# Patient Record
Sex: MALE | Race: WHITE | NOT HISPANIC OR LATINO | Employment: OTHER
[De-identification: names, ages, dates, MRNs, and addresses within clinical notes are randomized per-mention and may not be internally consistent; named-entity substitution may affect disease eponyms.]

---

## 2017-03-13 ENCOUNTER — HOSPITAL (OUTPATIENT)
Dept: OTHER | Age: 77
End: 2017-03-13
Attending: UROLOGY

## 2017-03-13 LAB — PSA SERPL-MCNC: 0.09 NG/ML

## 2017-09-11 ENCOUNTER — HOSPITAL (OUTPATIENT)
Dept: OTHER | Age: 77
End: 2017-09-11
Attending: UROLOGY

## 2017-09-11 LAB — PSA SERPL-MCNC: 0.07 NG/ML

## 2018-03-13 ENCOUNTER — HOSPITAL (OUTPATIENT)
Dept: OTHER | Age: 78
End: 2018-03-13
Attending: UROLOGY

## 2018-03-13 LAB — PSA SERPL-MCNC: 0.04 NG/ML

## 2018-08-20 ENCOUNTER — HOSPITAL (OUTPATIENT)
Dept: OTHER | Age: 78
End: 2018-08-20
Attending: INTERNAL MEDICINE

## 2018-08-29 PROBLEM — M70.62 TROCHANTERIC BURSITIS OF LEFT HIP: Status: ACTIVE | Noted: 2018-08-29

## 2018-09-17 ENCOUNTER — HOSPITAL (OUTPATIENT)
Dept: OTHER | Age: 78
End: 2018-09-17
Attending: UROLOGY

## 2018-09-17 LAB — PSA SERPL-MCNC: 0.02 NG/ML

## 2019-01-10 ENCOUNTER — HOSPITAL ENCOUNTER (OUTPATIENT)
Dept: MRI IMAGING | Facility: HOSPITAL | Age: 79
Discharge: HOME | End: 2019-01-10
Attending: NURSE PRACTITIONER
Payer: MEDICARE

## 2019-01-10 DIAGNOSIS — Z86.79 HISTORY OF INTRACRANIAL HEMORRHAGE: ICD-10-CM

## 2019-01-10 PROCEDURE — 70551 MRI BRAIN STEM W/O DYE: CPT | Performed by: NURSE PRACTITIONER

## 2019-02-08 ENCOUNTER — HOSPITAL (OUTPATIENT)
Dept: OTHER | Age: 79
End: 2019-02-08
Attending: SPECIALIST

## 2019-02-08 LAB
ALBUMIN SERPL-MCNC: 4.2 G/DL (ref 3.6–5.1)
ALBUMIN SERPL-MCNC: 4.2 GM/DL (ref 3.6–5.1)
ALBUMIN/GLOB SERPL: 1.3 {RATIO} (ref 1–2.4)
ALBUMIN/GLOB SERPL: 1.3 {RATIO} (ref 1–2.4)
ALP SERPL-CCNC: 56 UNIT/L (ref 45–117)
ALP SERPL-CCNC: 56 UNITS/L (ref 45–117)
ALT SERPL-CCNC: 35 UNIT/L
ALT SERPL-CCNC: 35 UNITS/L
ANALYZER ANC (IANC): NORMAL
ANALYZER ANC (IANC): NORMAL
ANION GAP SERPL CALC-SCNC: 12 MMOL/L (ref 10–20)
ANION GAP SERPL CALC-SCNC: 12 MMOL/L (ref 10–20)
AST SERPL-CCNC: 22 UNIT/L
AST SERPL-CCNC: 22 UNITS/L
BILIRUB SERPL-MCNC: 1.5 MG/DL (ref 0.2–1)
BILIRUB SERPL-MCNC: 1.5 MG/DL (ref 0.2–1)
BUN SERPL-MCNC: 23 MG/DL (ref 6–20)
BUN SERPL-MCNC: 23 MG/DL (ref 6–20)
BUN/CREAT SERPL: 30 (ref 7–25)
BUN/CREAT SERPL: 30 (ref 7–25)
CALCIUM SERPL-MCNC: 9.4 MG/DL (ref 8.4–10.2)
CALCIUM SERPL-MCNC: 9.4 MG/DL (ref 8.4–10.2)
CHLORIDE SERPL-SCNC: 103 MMOL/L (ref 98–107)
CHLORIDE: 103 MMOL/L (ref 98–107)
CHOLEST SERPL-MCNC: 108 MG/DL
CHOLEST SERPL-MCNC: 108 MG/DL
CHOLEST SERPL-MCNC: NORMAL MG/DL
CHOLEST/HDLC SERPL: 2.7 {RATIO}
CHOLEST/HDLC SERPL: 2.7 {RATIO}
CO2 SERPL-SCNC: 27 MMOL/L (ref 21–32)
CO2 SERPL-SCNC: 27 MMOL/L (ref 21–32)
CREAT SERPL-MCNC: 0.76 MG/DL (ref 0.67–1.17)
CREAT SERPL-MCNC: 0.76 MG/DL (ref 0.67–1.17)
ERYTHROCYTE [DISTWIDTH] IN BLOOD: 12.7 % (ref 11–15)
ERYTHROCYTE [DISTWIDTH] IN BLOOD: 12.7 % (ref 11–15)
FREE T4: 0.9 NG/DL (ref 0.8–1.5)
GLOBULIN SER-MCNC: 3.3 G/DL (ref 2–4)
GLOBULIN SER-MCNC: 3.3 GM/DL (ref 2–4)
GLUCOSE SERPL-MCNC: 110 MG/DL (ref 65–99)
GLUCOSE SERPL-MCNC: 110 MG/DL (ref 65–99)
HCT VFR BLD CALC: 42.2 % (ref 39–51)
HDLC SERPL-MCNC: 40 MG/DL
HDLC SERPL-MCNC: 40 MG/DL
HDLC SERPL-MCNC: NORMAL MG/DL
HEMATOCRIT: 42.2 % (ref 39–51)
HGB BLD-MCNC: 13.9 G/DL (ref 13–17)
HGB BLD-MCNC: 13.9 GM/DL (ref 13–17)
LDLC SERPL CALC-MCNC: 59 MG/DL
LDLC SERPL CALC-MCNC: 59 MG/DL
LDLC SERPL CALC-MCNC: NORMAL MG/DL
LENGTH OF FAST TIME PATIENT: ABNORMAL HR
LENGTH OF FAST TIME PATIENT: ABNORMAL HRS
LENGTH OF FAST TIME PATIENT: NORMAL HR
LENGTH OF FAST TIME PATIENT: NORMAL HRS
MAGNESIUM SERPL-MCNC: 1.8 MG/DL (ref 1.7–2.4)
MAGNESIUM SERPL-MCNC: 1.8 MG/DL (ref 1.7–2.4)
MCH RBC QN AUTO: 30.6 PG (ref 26–34)
MCH RBC QN AUTO: 30.6 PG (ref 26–34)
MCHC RBC AUTO-ENTMCNC: 32.9 G/DL (ref 32–36.5)
MCHC RBC AUTO-ENTMCNC: 32.9 GM/DL (ref 32–36.5)
MCV RBC AUTO: 93 FL (ref 78–100)
MCV RBC AUTO: 93 FL (ref 78–100)
NONHDLC SERPL-MCNC: 68 MG/DL
NONHDLC SERPL-MCNC: 68 MG/DL
NONHDLC SERPL-MCNC: NORMAL MG/DL
NRBC (NRBCRE): 0 /100 WBC
NRBC (NRBCRE): 0 /100 WBC
PLATELET # BLD: 181 K/MCL (ref 140–450)
PLATELET # BLD: 181 THOUSAND/MCL (ref 140–450)
POTASSIUM SERPL-SCNC: 4.4 MMOL/L (ref 3.4–5.1)
POTASSIUM SERPL-SCNC: 4.4 MMOL/L (ref 3.4–5.1)
PROT SERPL-MCNC: 7.5 G/DL (ref 6.4–8.2)
PROT SERPL-MCNC: 7.5 GM/DL (ref 6.4–8.2)
RBC # BLD: 4.54 MIL/MCL (ref 4.5–5.9)
RBC # BLD: 4.54 MILLION/MCL (ref 4.5–5.9)
SODIUM SERPL-SCNC: 138 MMOL/L (ref 135–145)
SODIUM SERPL-SCNC: 138 MMOL/L (ref 135–145)
T4 FREE SERPL-MCNC: 0.9 NG/DL (ref 0.8–1.5)
TRIGL SERPL-MCNC: 44 MG/DL
TRIGL SERPL-MCNC: NORMAL MG/DL
TRIGLYCERIDE (TRIGP): 44 MG/DL
TSH SERPL-ACNC: 1.09 MCUNIT/ML (ref 0.35–5)
TSH SERPL-ACNC: 1.09 MCUNITS/ML (ref 0.35–5)
WBC # BLD: 5.7 K/MCL (ref 4.2–11)
WBC # BLD: 5.7 THOUSAND/MCL (ref 4.2–11)

## 2019-02-21 ENCOUNTER — EXTERNAL RECORD (OUTPATIENT)
Dept: HEALTH INFORMATION MANAGEMENT | Facility: OTHER | Age: 79
End: 2019-02-21

## 2019-03-15 ENCOUNTER — HOSPITAL (OUTPATIENT)
Dept: OTHER | Age: 79
End: 2019-03-15
Attending: UROLOGY

## 2019-03-15 LAB
PSA SERPL-MCNC: 0.02 NG/ML
PSA SERPL-MCNC: 0.02 NG/ML
PSA SERPL-MCNC: NORMAL NG/ML

## 2019-09-09 ENCOUNTER — HOSPITAL (OUTPATIENT)
Dept: OTHER | Age: 79
End: 2019-09-09
Attending: UROLOGY

## 2019-09-09 LAB
PSA SERPL-MCNC: 0.02 NG/ML
PSA SERPL-MCNC: NORMAL NG/ML

## 2019-12-03 ENCOUNTER — HOSPITAL (OUTPATIENT)
Dept: OTHER | Age: 79
End: 2019-12-03
Attending: SPECIALIST

## 2020-03-09 ENCOUNTER — HOSPITAL ENCOUNTER (OUTPATIENT)
Dept: LAB | Age: 80
Discharge: HOME | End: 2020-03-09
Attending: UROLOGY

## 2020-03-09 DIAGNOSIS — C61 MALIGNANT NEOPLASM OF PROSTATE (CMD): Primary | ICD-10-CM

## 2020-03-09 LAB — PSA SERPL-MCNC: 0.01 NG/ML

## 2020-03-09 PROCEDURE — 84153 ASSAY OF PSA TOTAL: CPT

## 2020-03-09 PROCEDURE — 36415 COLL VENOUS BLD VENIPUNCTURE: CPT

## 2020-06-19 ENCOUNTER — APPOINTMENT (OUTPATIENT)
Dept: GENERAL RADIOLOGY | Age: 80
DRG: 312 | End: 2020-06-19
Attending: EMERGENCY MEDICINE

## 2020-06-19 ENCOUNTER — APPOINTMENT (OUTPATIENT)
Dept: MRI IMAGING | Age: 80
DRG: 312 | End: 2020-06-19
Attending: EMERGENCY MEDICINE

## 2020-06-19 ENCOUNTER — APPOINTMENT (OUTPATIENT)
Dept: CT IMAGING | Age: 80
DRG: 312 | End: 2020-06-19
Attending: EMERGENCY MEDICINE

## 2020-06-19 ENCOUNTER — HOSPITAL ENCOUNTER (INPATIENT)
Age: 80
LOS: 1 days | Discharge: HOME | DRG: 312 | End: 2020-06-21
Attending: EMERGENCY MEDICINE | Admitting: INTERNAL MEDICINE

## 2020-06-19 DIAGNOSIS — R55 SYNCOPE AND COLLAPSE: Primary | ICD-10-CM

## 2020-06-19 LAB
ALBUMIN SERPL-MCNC: 3.7 G/DL (ref 3.6–5.1)
ALBUMIN/GLOB SERPL: 1.2 {RATIO} (ref 1–2.4)
ALP SERPL-CCNC: 60 UNITS/L (ref 45–117)
ALT SERPL-CCNC: 44 UNITS/L
ANION GAP SERPL CALC-SCNC: 8 MMOL/L (ref 10–20)
AST SERPL-CCNC: 31 UNITS/L
ATRIAL RATE (BPM): 70
BASOPHILS # BLD: 0 K/MCL (ref 0–0.3)
BASOPHILS NFR BLD: 0 %
BILIRUB SERPL-MCNC: 1 MG/DL (ref 0.2–1)
BUN SERPL-MCNC: 26 MG/DL (ref 6–20)
BUN/CREAT SERPL: 33 (ref 7–25)
CALCIUM SERPL-MCNC: 8.8 MG/DL (ref 8.4–10.2)
CHLORIDE SERPL-SCNC: 108 MMOL/L (ref 98–107)
CO2 SERPL-SCNC: 26 MMOL/L (ref 21–32)
CREAT SERPL-MCNC: 0.8 MG/DL (ref 0.67–1.17)
DIFFERENTIAL METHOD BLD: ABNORMAL
EOSINOPHIL # BLD: 0.1 K/MCL (ref 0.1–0.5)
EOSINOPHIL NFR BLD: 2 %
ERYTHROCYTE [DISTWIDTH] IN BLOOD: 13.7 % (ref 11–15)
GLOBULIN SER-MCNC: 3.2 G/DL (ref 2–4)
GLUCOSE SERPL-MCNC: 121 MG/DL (ref 65–99)
HCT VFR BLD CALC: 36 % (ref 39–51)
HGB BLD-MCNC: 12.4 G/DL (ref 13–17)
IMM GRANULOCYTES # BLD AUTO: 0 K/MCL (ref 0–0.2)
IMM GRANULOCYTES NFR BLD: 0 %
LYMPHOCYTES # BLD: 1.5 K/MCL (ref 1–4)
LYMPHOCYTES NFR BLD: 28 %
MCH RBC QN AUTO: 31.8 PG (ref 26–34)
MCHC RBC AUTO-ENTMCNC: 34.4 G/DL (ref 32–36.5)
MCV RBC AUTO: 92.3 FL (ref 78–100)
MONOCYTES # BLD: 1.1 K/MCL (ref 0.3–0.9)
MONOCYTES NFR BLD: 20 %
NEUTROPHILS # BLD: 2.7 K/MCL (ref 1.8–7.7)
NEUTROPHILS NFR BLD: 50 %
NRBC BLD MANUAL-RTO: 0 /100 WBC
PLATELET # BLD: 177 K/MCL (ref 140–450)
POTASSIUM SERPL-SCNC: 3.9 MMOL/L (ref 3.4–5.1)
PROT SERPL-MCNC: 6.9 G/DL (ref 6.4–8.2)
QRS-INTERVAL (MSEC): 94
QT-INTERVAL (MSEC): 436
QTC: 470
R AXIS (DEGREES): 46
RBC # BLD: 3.9 MIL/MCL (ref 4.5–5.9)
REPORT TEXT: NORMAL
SARS-COV-2 RNA RESP QL NAA+PROBE: NOT DETECTED
SERVICE CMNT-IMP: NORMAL
SODIUM SERPL-SCNC: 138 MMOL/L (ref 135–145)
SPECIMEN SOURCE: NORMAL
T AXIS (DEGREES): 29
TROPONIN I SERPL HS-MCNC: <0.02 NG/ML
VENTRICULAR RATE EKG/MIN (BPM): 70
WBC # BLD: 5.5 K/MCL (ref 4.2–11)

## 2020-06-19 PROCEDURE — 93005 ELECTROCARDIOGRAM TRACING: CPT | Performed by: EMERGENCY MEDICINE

## 2020-06-19 PROCEDURE — 10002803 HB RX 637: Performed by: INTERNAL MEDICINE

## 2020-06-19 PROCEDURE — 71046 X-RAY EXAM CHEST 2 VIEWS: CPT

## 2020-06-19 PROCEDURE — G0378 HOSPITAL OBSERVATION PER HR: HCPCS

## 2020-06-19 PROCEDURE — 36415 COLL VENOUS BLD VENIPUNCTURE: CPT

## 2020-06-19 PROCEDURE — 87635 SARS-COV-2 COVID-19 AMP PRB: CPT

## 2020-06-19 PROCEDURE — 85025 COMPLETE CBC W/AUTO DIFF WBC: CPT

## 2020-06-19 PROCEDURE — 10002803 HB RX 637: Performed by: SPECIALIST

## 2020-06-19 PROCEDURE — 84484 ASSAY OF TROPONIN QUANT: CPT

## 2020-06-19 PROCEDURE — 99285 EMERGENCY DEPT VISIT HI MDM: CPT

## 2020-06-19 PROCEDURE — 80053 COMPREHEN METABOLIC PANEL: CPT

## 2020-06-19 PROCEDURE — 70450 CT HEAD/BRAIN W/O DYE: CPT

## 2020-06-19 RX ORDER — MV-MIN/FA/VIT K/LUTEIN/ZEAXANT 200MCG-5MG
1 CAPSULE ORAL DAILY
COMMUNITY
End: 2022-10-19

## 2020-06-19 RX ORDER — LOSARTAN POTASSIUM 25 MG/1
25 TABLET ORAL DAILY
Status: ON HOLD | COMMUNITY
End: 2020-06-21 | Stop reason: HOSPADM

## 2020-06-19 RX ORDER — HYDROCHLOROTHIAZIDE 12.5 MG/1
12.5 TABLET ORAL DAILY
Status: ON HOLD | COMMUNITY
End: 2020-06-21 | Stop reason: HOSPADM

## 2020-06-19 RX ORDER — MULTIVITAMIN,THER AND MINERALS
1 TABLET ORAL DAILY
Status: DISCONTINUED | OUTPATIENT
Start: 2020-06-19 | End: 2020-06-21 | Stop reason: HOSPADM

## 2020-06-19 RX ORDER — PANTOPRAZOLE SODIUM 40 MG/1
40 TABLET, DELAYED RELEASE ORAL NIGHTLY
Status: ON HOLD | COMMUNITY
Start: 2020-05-21 | End: 2020-06-21 | Stop reason: HOSPADM

## 2020-06-19 RX ORDER — ATORVASTATIN CALCIUM 20 MG/1
20 TABLET, FILM COATED ORAL NIGHTLY
Status: DISCONTINUED | OUTPATIENT
Start: 2020-06-19 | End: 2020-06-21 | Stop reason: HOSPADM

## 2020-06-19 RX ORDER — PANTOPRAZOLE SODIUM 40 MG/1
40 TABLET, DELAYED RELEASE ORAL NIGHTLY
Status: DISCONTINUED | OUTPATIENT
Start: 2020-06-19 | End: 2020-06-21 | Stop reason: HOSPADM

## 2020-06-19 RX ORDER — APIXABAN 5 MG/1
5 TABLET, FILM COATED ORAL EVERY 12 HOURS SCHEDULED
COMMUNITY
Start: 2020-04-05 | End: 2022-07-05 | Stop reason: SDUPTHER

## 2020-06-19 RX ORDER — TAMSULOSIN HYDROCHLORIDE 0.4 MG/1
0.4 CAPSULE ORAL NIGHTLY
Status: DISCONTINUED | OUTPATIENT
Start: 2020-06-19 | End: 2020-06-21 | Stop reason: HOSPADM

## 2020-06-19 RX ORDER — LEVOTHYROXINE SODIUM 0.03 MG/1
25 TABLET ORAL
COMMUNITY
End: 2020-06-19 | Stop reason: CLARIF

## 2020-06-19 RX ORDER — CHLORAL HYDRATE 500 MG
2 CAPSULE ORAL DAILY
COMMUNITY
End: 2023-09-28 | Stop reason: SDUPTHER

## 2020-06-19 RX ORDER — LOSARTAN POTASSIUM 50 MG/1
TABLET ORAL
COMMUNITY
Start: 2020-06-04 | End: 2020-06-19 | Stop reason: CLARIF

## 2020-06-19 RX ORDER — AMIODARONE HYDROCHLORIDE 200 MG/1
200 TABLET ORAL NIGHTLY
Status: DISCONTINUED | OUTPATIENT
Start: 2020-06-19 | End: 2020-06-21 | Stop reason: HOSPADM

## 2020-06-19 RX ORDER — ATORVASTATIN CALCIUM 20 MG/1
20 TABLET, FILM COATED ORAL NIGHTLY
COMMUNITY
Start: 2020-04-17 | End: 2023-09-14 | Stop reason: SDUPTHER

## 2020-06-19 RX ORDER — SOTALOL HYDROCHLORIDE 80 MG/1
80 TABLET ORAL
COMMUNITY
End: 2020-06-19 | Stop reason: ALTCHOICE

## 2020-06-19 RX ORDER — ENALAPRIL MALEATE 10 MG/1
15 TABLET ORAL 2 TIMES DAILY
COMMUNITY
End: 2022-11-04 | Stop reason: SDUPTHER

## 2020-06-19 RX ORDER — LOSARTAN POTASSIUM 25 MG/1
25 TABLET ORAL DAILY
Status: DISCONTINUED | OUTPATIENT
Start: 2020-06-20 | End: 2020-06-20

## 2020-06-19 RX ORDER — AMIODARONE HYDROCHLORIDE 200 MG/1
200 TABLET ORAL NIGHTLY
COMMUNITY
Start: 2020-04-10 | End: 2022-08-08 | Stop reason: SDUPTHER

## 2020-06-19 RX ORDER — METOPROLOL SUCCINATE 25 MG/1
25 TABLET, EXTENDED RELEASE ORAL NIGHTLY
Status: DISCONTINUED | OUTPATIENT
Start: 2020-06-19 | End: 2020-06-21 | Stop reason: HOSPADM

## 2020-06-19 RX ORDER — HYDROCHLOROTHIAZIDE 12.5 MG/1
12.5 TABLET ORAL DAILY
Status: DISCONTINUED | OUTPATIENT
Start: 2020-06-20 | End: 2020-06-20

## 2020-06-19 RX ORDER — ASPIRIN 325 MG
81 TABLET ORAL
COMMUNITY
End: 2020-06-19 | Stop reason: DRUGHIGH

## 2020-06-19 RX ORDER — TAMSULOSIN HYDROCHLORIDE 0.4 MG/1
0.4 CAPSULE ORAL EVERY EVENING
COMMUNITY

## 2020-06-19 RX ORDER — METOPROLOL SUCCINATE 25 MG/1
25 TABLET, EXTENDED RELEASE ORAL NIGHTLY
COMMUNITY
End: 2022-11-04 | Stop reason: SDUPTHER

## 2020-06-19 RX ADMIN — APIXABAN 5 MG: 5 TABLET, FILM COATED ORAL at 21:51

## 2020-06-19 RX ADMIN — ENALAPRIL MALEATE 15 MG: 10 TABLET ORAL at 21:51

## 2020-06-19 RX ADMIN — METOPROLOL SUCCINATE 25 MG: 25 TABLET, EXTENDED RELEASE ORAL at 21:51

## 2020-06-19 RX ADMIN — PANTOPRAZOLE SODIUM 40 MG: 40 TABLET, DELAYED RELEASE ORAL at 21:52

## 2020-06-19 RX ADMIN — TAMSULOSIN HYDROCHLORIDE 0.4 MG: 0.4 CAPSULE ORAL at 21:51

## 2020-06-19 RX ADMIN — AMIODARONE HYDROCHLORIDE 200 MG: 200 TABLET ORAL at 21:51

## 2020-06-19 RX ADMIN — ATORVASTATIN CALCIUM 20 MG: 20 TABLET, FILM COATED ORAL at 21:51

## 2020-06-19 ASSESSMENT — LIFESTYLE VARIABLES
HOW OFTEN DO YOU HAVE A DRINK CONTAINING ALCOHOL: 2 TO 4 TIMES PER MONTH
AUDIT-C TOTAL SCORE: 2
ALCOHOL_USE_STATUS: NO OR LOW RISK WITH VALIDATED TOOL
HOW OFTEN DO YOU HAVE 6 OR MORE DRINKS ON ONE OCCASION: NEVER
HOW MANY STANDARD DRINKS CONTAINING ALCOHOL DO YOU HAVE ON A TYPICAL DAY: 0,1 OR 2

## 2020-06-19 ASSESSMENT — COGNITIVE AND FUNCTIONAL STATUS - GENERAL
ARE YOU BLIND OR DO YOU HAVE SERIOUS DIFFICULTY SEEING, EVEN WHEN WEARING GLASSES: NO
DO YOU HAVE SERIOUS DIFFICULTY WALKING OR CLIMBING STAIRS: NO
ARE YOU DEAF OR DO YOU HAVE SERIOUS DIFFICULTY  HEARING: NO
BECAUSE OF A PHYSICAL, MENTAL, OR EMOTIONAL CONDITION, DO YOU HAVE SERIOUS DIFFICULTY CONCENTRATING, REMEMBERING OR MAKING DECISIONS: NO
BECAUSE OF A PHYSICAL, MENTAL, OR EMOTIONAL CONDITION, DO YOU HAVE DIFFICULTY DOING ERRANDS ALONE: NO
ARE YOU DEAF OR DO YOU HAVE SERIOUS DIFFICULTY  HEARING: NO
ARE YOU BLIND OR DO YOU HAVE SERIOUS DIFFICULTY SEEING, EVEN WHEN WEARING GLASSES: NO
DO YOU HAVE DIFFICULTY DRESSING OR BATHING: NO

## 2020-06-19 ASSESSMENT — PATIENT HEALTH QUESTIONNAIRE - PHQ9
CLINICAL INTERPRETATION OF PHQ2 SCORE: NO FURTHER SCREENING NEEDED
SUM OF ALL RESPONSES TO PHQ9 QUESTIONS 1 AND 2: 0
IS PATIENT ABLE TO COMPLETE PHQ2 OR PHQ9: YES
CLINICAL INTERPRETATION OF PHQ9 SCORE: NO FURTHER SCREENING NEEDED

## 2020-06-19 ASSESSMENT — ACTIVITIES OF DAILY LIVING (ADL)
CHRONIC_PAIN_PRESENT: NO
RECENT_DECLINE_ADL: NO
ADL_SHORT_OF_BREATH: NO
ADL_SCORE: 12
ADL_BEFORE_ADMISSION: INDEPENDENT

## 2020-06-19 ASSESSMENT — PAIN SCALES - GENERAL
PAINLEVEL_OUTOF10: 0

## 2020-06-20 PROCEDURE — 10002807 HB RX 258: Performed by: INTERNAL MEDICINE

## 2020-06-20 PROCEDURE — 10004651 HB RX, NO CHARGE ITEM: Performed by: SPECIALIST

## 2020-06-20 PROCEDURE — 10003585 HB ROOM CHARGE INTERMEDIATE CARE

## 2020-06-20 PROCEDURE — G0378 HOSPITAL OBSERVATION PER HR: HCPCS

## 2020-06-20 PROCEDURE — 10002803 HB RX 637: Performed by: INTERNAL MEDICINE

## 2020-06-20 PROCEDURE — 10002803 HB RX 637: Performed by: SPECIALIST

## 2020-06-20 PROCEDURE — 10004651 HB RX, NO CHARGE ITEM: Performed by: INTERNAL MEDICINE

## 2020-06-20 RX ORDER — ACETAMINOPHEN 325 MG/1
TABLET ORAL
Status: DISPENSED
Start: 2020-06-20 | End: 2020-06-21

## 2020-06-20 RX ORDER — SODIUM CHLORIDE 9 MG/ML
INJECTION, SOLUTION INTRAVENOUS CONTINUOUS
Status: DISPENSED | OUTPATIENT
Start: 2020-06-20 | End: 2020-06-21

## 2020-06-20 RX ORDER — ACETAMINOPHEN 325 MG/1
650 TABLET ORAL EVERY 6 HOURS PRN
Status: DISCONTINUED | OUTPATIENT
Start: 2020-06-20 | End: 2020-06-21 | Stop reason: HOSPADM

## 2020-06-20 RX ADMIN — SODIUM CHLORIDE: 0.9 INJECTION, SOLUTION INTRAVENOUS at 17:51

## 2020-06-20 RX ADMIN — ENALAPRIL MALEATE 15 MG: 10 TABLET ORAL at 20:54

## 2020-06-20 RX ADMIN — PANTOPRAZOLE SODIUM 40 MG: 40 TABLET, DELAYED RELEASE ORAL at 20:55

## 2020-06-20 RX ADMIN — ACETAMINOPHEN 650 MG: 325 TABLET, FILM COATED ORAL at 21:04

## 2020-06-20 RX ADMIN — ASPIRIN 81 MG: 81 TABLET, COATED ORAL at 09:25

## 2020-06-20 RX ADMIN — ATORVASTATIN CALCIUM 20 MG: 20 TABLET, FILM COATED ORAL at 20:54

## 2020-06-20 RX ADMIN — APIXABAN 5 MG: 5 TABLET, FILM COATED ORAL at 09:25

## 2020-06-20 RX ADMIN — METOPROLOL SUCCINATE 25 MG: 25 TABLET, EXTENDED RELEASE ORAL at 20:54

## 2020-06-20 RX ADMIN — ENALAPRIL MALEATE 15 MG: 10 TABLET ORAL at 09:51

## 2020-06-20 RX ADMIN — APIXABAN 5 MG: 5 TABLET, FILM COATED ORAL at 20:54

## 2020-06-20 RX ADMIN — AMIODARONE HYDROCHLORIDE 200 MG: 200 TABLET ORAL at 20:54

## 2020-06-20 RX ADMIN — TAMSULOSIN HYDROCHLORIDE 0.4 MG: 0.4 CAPSULE ORAL at 20:54

## 2020-06-20 RX ADMIN — Medication 1 TABLET: at 09:25

## 2020-06-20 ASSESSMENT — PAIN SCALES - GENERAL
PAINLEVEL_OUTOF10: 0
PAINLEVEL_OUTOF10: 4

## 2020-06-21 VITALS
HEIGHT: 70 IN | SYSTOLIC BLOOD PRESSURE: 160 MMHG | OXYGEN SATURATION: 98 % | WEIGHT: 151.24 LBS | RESPIRATION RATE: 20 BRPM | DIASTOLIC BLOOD PRESSURE: 79 MMHG | HEART RATE: 70 BPM | BODY MASS INDEX: 21.65 KG/M2 | TEMPERATURE: 97.7 F

## 2020-06-21 LAB
ALBUMIN SERPL-MCNC: 3.6 G/DL (ref 3.6–5.1)
ALBUMIN/GLOB SERPL: 1 {RATIO} (ref 1–2.4)
ALP SERPL-CCNC: 64 UNITS/L (ref 45–117)
ALT SERPL-CCNC: 39 UNITS/L
ANION GAP SERPL CALC-SCNC: 7 MMOL/L (ref 10–20)
AST SERPL-CCNC: 22 UNITS/L
BASOPHILS # BLD: 0 K/MCL (ref 0–0.3)
BASOPHILS NFR BLD: 0 %
BILIRUB SERPL-MCNC: 0.9 MG/DL (ref 0.2–1)
BUN SERPL-MCNC: 18 MG/DL (ref 6–20)
BUN/CREAT SERPL: 27 (ref 7–25)
CALCIUM SERPL-MCNC: 9 MG/DL (ref 8.4–10.2)
CHLORIDE SERPL-SCNC: 105 MMOL/L (ref 98–107)
CO2 SERPL-SCNC: 29 MMOL/L (ref 21–32)
CREAT SERPL-MCNC: 0.68 MG/DL (ref 0.67–1.17)
DIFFERENTIAL METHOD BLD: ABNORMAL
EOSINOPHIL # BLD: 0.1 K/MCL (ref 0.1–0.5)
EOSINOPHIL NFR BLD: 1 %
ERYTHROCYTE [DISTWIDTH] IN BLOOD: 13.5 % (ref 11–15)
GLOBULIN SER-MCNC: 3.6 G/DL (ref 2–4)
GLUCOSE SERPL-MCNC: 117 MG/DL (ref 65–99)
HCT VFR BLD CALC: 40 % (ref 39–51)
HGB BLD-MCNC: 13.4 G/DL (ref 13–17)
IMM GRANULOCYTES # BLD AUTO: 0 K/MCL (ref 0–0.2)
IMM GRANULOCYTES NFR BLD: 0 %
LYMPHOCYTES # BLD: 1.3 K/MCL (ref 1–4)
LYMPHOCYTES NFR BLD: 20 %
MCH RBC QN AUTO: 31.5 PG (ref 26–34)
MCHC RBC AUTO-ENTMCNC: 33.5 G/DL (ref 32–36.5)
MCV RBC AUTO: 93.9 FL (ref 78–100)
MONOCYTES # BLD: 1.2 K/MCL (ref 0.3–0.9)
MONOCYTES NFR BLD: 19 %
NEUTROPHILS # BLD: 3.9 K/MCL (ref 1.8–7.7)
NEUTROPHILS NFR BLD: 60 %
NRBC BLD MANUAL-RTO: 0 /100 WBC
PLATELET # BLD: 191 K/MCL (ref 140–450)
POTASSIUM SERPL-SCNC: 4.7 MMOL/L (ref 3.4–5.1)
PROT SERPL-MCNC: 7.2 G/DL (ref 6.4–8.2)
RBC # BLD: 4.26 MIL/MCL (ref 4.5–5.9)
SODIUM SERPL-SCNC: 136 MMOL/L (ref 135–145)
WBC # BLD: 6.6 K/MCL (ref 4.2–11)

## 2020-06-21 PROCEDURE — 85025 COMPLETE CBC W/AUTO DIFF WBC: CPT

## 2020-06-21 PROCEDURE — 10002803 HB RX 637: Performed by: INTERNAL MEDICINE

## 2020-06-21 PROCEDURE — 80053 COMPREHEN METABOLIC PANEL: CPT

## 2020-06-21 PROCEDURE — 36415 COLL VENOUS BLD VENIPUNCTURE: CPT

## 2020-06-21 PROCEDURE — 10004651 HB RX, NO CHARGE ITEM: Performed by: SPECIALIST

## 2020-06-21 PROCEDURE — 10002803 HB RX 637: Performed by: SPECIALIST

## 2020-06-21 RX ORDER — MULTIVITAMIN,THER AND MINERALS
1 TABLET ORAL DAILY
Qty: 30 TABLET | Refills: 0 | INPATIENT
Start: 2020-06-22 | End: 2020-06-21 | Stop reason: CLARIF

## 2020-06-21 RX ORDER — PANTOPRAZOLE SODIUM 40 MG/1
40 TABLET, DELAYED RELEASE ORAL NIGHTLY
INPATIENT
Start: 2020-06-21 | End: 2023-08-28

## 2020-06-21 RX ADMIN — Medication 1 TABLET: at 08:48

## 2020-06-21 RX ADMIN — ENALAPRIL MALEATE 15 MG: 10 TABLET ORAL at 08:48

## 2020-06-21 RX ADMIN — ASPIRIN 81 MG: 81 TABLET, COATED ORAL at 08:47

## 2020-06-21 RX ADMIN — APIXABAN 5 MG: 5 TABLET, FILM COATED ORAL at 08:48

## 2020-06-21 ASSESSMENT — PAIN SCALES - GENERAL: PAINLEVEL_OUTOF10: 0

## 2020-09-11 ENCOUNTER — HOSPITAL ENCOUNTER (OUTPATIENT)
Dept: LAB | Age: 80
Discharge: HOME | End: 2020-09-11
Attending: UROLOGY

## 2020-09-11 DIAGNOSIS — N41.9 PROSTATITIS, UNSPECIFIED PROSTATITIS TYPE: Primary | ICD-10-CM

## 2020-09-11 LAB — PSA SERPL-MCNC: 0.01 NG/ML

## 2020-09-11 PROCEDURE — 84153 ASSAY OF PSA TOTAL: CPT

## 2020-09-11 PROCEDURE — 36415 COLL VENOUS BLD VENIPUNCTURE: CPT

## 2021-03-10 ENCOUNTER — HOSPITAL ENCOUNTER (OUTPATIENT)
Dept: LAB | Age: 81
Discharge: HOME | End: 2021-03-10
Attending: UROLOGY

## 2021-03-10 DIAGNOSIS — C61 PROSTATE CANCER (CMD): Primary | ICD-10-CM

## 2021-03-10 LAB — PSA SERPL-MCNC: <0.01 NG/ML

## 2021-03-10 PROCEDURE — 84153 ASSAY OF PSA TOTAL: CPT

## 2021-03-10 PROCEDURE — 36415 COLL VENOUS BLD VENIPUNCTURE: CPT

## 2021-03-18 ENCOUNTER — IMMUNIZATION (OUTPATIENT)
Dept: LAB | Age: 81
End: 2021-03-18

## 2021-03-18 DIAGNOSIS — Z23 NEED FOR VACCINATION: Primary | ICD-10-CM

## 2021-03-18 PROCEDURE — 91300 COVID 19 PFIZER-BIONTECH: CPT | Performed by: NURSE PRACTITIONER

## 2021-03-18 PROCEDURE — 0001A COVID 19 PFIZER-BIONTECH: CPT | Performed by: NURSE PRACTITIONER

## 2021-04-08 ENCOUNTER — IMMUNIZATION (OUTPATIENT)
Dept: LAB | Age: 81
End: 2021-04-08

## 2021-04-08 DIAGNOSIS — Z23 NEED FOR VACCINATION: Primary | ICD-10-CM

## 2021-04-08 PROCEDURE — 91300 COVID 19 PFIZER-BIONTECH: CPT

## 2021-04-08 PROCEDURE — 0002A COVID 19 PFIZER-BIONTECH: CPT

## 2021-09-09 ENCOUNTER — HOSPITAL ENCOUNTER (OUTPATIENT)
Dept: LAB | Age: 81
Discharge: HOME | End: 2021-09-09
Attending: UROLOGY

## 2021-09-09 DIAGNOSIS — Z12.5 PROSTATE CANCER SCREENING: Primary | ICD-10-CM

## 2021-09-09 LAB — PSA SERPL-MCNC: <0.01 NG/ML

## 2021-09-09 PROCEDURE — 84153 ASSAY OF PSA TOTAL: CPT | Performed by: UROLOGY

## 2021-09-09 PROCEDURE — 36415 COLL VENOUS BLD VENIPUNCTURE: CPT | Performed by: UROLOGY

## 2022-01-07 ENCOUNTER — APPOINTMENT (OUTPATIENT)
Dept: URGENT CARE | Age: 82
End: 2022-01-07

## 2022-01-07 DIAGNOSIS — Z01.812 ENCOUNTER FOR SCREENING LABORATORY TESTING FOR COVID-19 VIRUS IN ASYMPTOMATIC PATIENT: Primary | ICD-10-CM

## 2022-01-07 DIAGNOSIS — Z11.52 ENCOUNTER FOR SCREENING LABORATORY TESTING FOR COVID-19 VIRUS IN ASYMPTOMATIC PATIENT: Primary | ICD-10-CM

## 2022-01-07 PROCEDURE — U0003 INFECTIOUS AGENT DETECTION BY NUCLEIC ACID (DNA OR RNA); SEVERE ACUTE RESPIRATORY SYNDROME CORONAVIRUS 2 (SARS-COV-2) (CORONAVIRUS DISEASE [COVID-19]), AMPLIFIED PROBE TECHNIQUE, MAKING USE OF HIGH THROUGHPUT TECHNOLOGIES AS DESCRIBED BY CMS-2020-01-R: HCPCS | Performed by: PSYCHIATRY & NEUROLOGY

## 2022-01-07 PROCEDURE — U0005 INFEC AGEN DETEC AMPLI PROBE: HCPCS | Performed by: PSYCHIATRY & NEUROLOGY

## 2022-01-08 LAB
SARS-COV-2 RNA RESP QL NAA+PROBE: NOT DETECTED
SERVICE CMNT-IMP: NORMAL
SERVICE CMNT-IMP: NORMAL

## 2022-02-23 ENCOUNTER — EXTERNAL RECORD (OUTPATIENT)
Dept: HEALTH INFORMATION MANAGEMENT | Facility: OTHER | Age: 82
End: 2022-02-23

## 2022-02-23 ENCOUNTER — APPOINTMENT (OUTPATIENT)
Dept: CT IMAGING | Facility: HOSPITAL | Age: 82
End: 2022-02-23
Attending: EMERGENCY MEDICINE
Payer: COMMERCIAL

## 2022-02-23 ENCOUNTER — HOSPITAL ENCOUNTER (EMERGENCY)
Facility: HOSPITAL | Age: 82
Discharge: HOME | End: 2022-02-23
Attending: EMERGENCY MEDICINE
Payer: COMMERCIAL

## 2022-02-23 VITALS
RESPIRATION RATE: 18 BRPM | SYSTOLIC BLOOD PRESSURE: 192 MMHG | DIASTOLIC BLOOD PRESSURE: 81 MMHG | OXYGEN SATURATION: 100 % | HEART RATE: 73 BPM | TEMPERATURE: 98 F

## 2022-02-23 DIAGNOSIS — S00.83XA TRAUMATIC HEMATOMA OF FOREHEAD, INITIAL ENCOUNTER: ICD-10-CM
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
## 2022-02-23 PROCEDURE — 99284 EMERGENCY DEPT VISIT MOD MDM: CPT

## 2022-02-23 PROCEDURE — 70450 CT HEAD/BRAIN W/O DYE: CPT | Performed by: EMERGENCY MEDICINE

## 2022-02-23 RX ORDER — ACETAMINOPHEN 500 MG
1000 TABLET ORAL ONCE
Status: COMPLETED | OUTPATIENT
Start: 2022-02-23 | End: 2022-02-23
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
## 2022-03-11 ENCOUNTER — HOSPITAL ENCOUNTER (OUTPATIENT)
Dept: LAB | Age: 82
Discharge: HOME | End: 2022-03-11
Attending: UROLOGY

## 2022-03-11 DIAGNOSIS — C61 MALIGNANT NEOPLASM OF PROSTATE (CMD): Primary | ICD-10-CM

## 2022-03-11 LAB — PSA SERPL-MCNC: <0.01 NG/ML

## 2022-03-11 PROCEDURE — 36415 COLL VENOUS BLD VENIPUNCTURE: CPT | Performed by: UROLOGY

## 2022-03-11 PROCEDURE — 84153 ASSAY OF PSA TOTAL: CPT | Performed by: UROLOGY

## 2022-03-14 ENCOUNTER — OFFICE VISIT (OUTPATIENT)
Dept: SURGERY | Age: 82
End: 2022-03-14

## 2022-03-14 VITALS
HEART RATE: 71 BPM | SYSTOLIC BLOOD PRESSURE: 172 MMHG | BODY MASS INDEX: 23.62 KG/M2 | DIASTOLIC BLOOD PRESSURE: 80 MMHG | HEIGHT: 70 IN | OXYGEN SATURATION: 100 % | WEIGHT: 165 LBS

## 2022-03-14 DIAGNOSIS — T14.8XXA HEMATOMA: Primary | ICD-10-CM

## 2022-03-14 PROBLEM — R22.0 MASS OF SCALP: Status: ACTIVE | Noted: 2022-02-23
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
## 2022-03-14 PROCEDURE — 87205 SMEAR GRAM STAIN: CPT | Performed by: PSYCHIATRY & NEUROLOGY

## 2022-03-14 PROCEDURE — 87075 CULTR BACTERIA EXCEPT BLOOD: CPT | Performed by: PSYCHIATRY & NEUROLOGY

## 2022-03-14 PROCEDURE — 87070 CULTURE OTHR SPECIMN AEROBIC: CPT | Performed by: PSYCHIATRY & NEUROLOGY

## 2022-03-14 PROCEDURE — 87076 CULTURE ANAEROBE IDENT EACH: CPT | Performed by: PSYCHIATRY & NEUROLOGY

## 2022-03-14 PROCEDURE — 99204 OFFICE O/P NEW MOD 45 MIN: CPT

## 2022-03-14 ASSESSMENT — PAIN SCALES - GENERAL: PAINLEVEL: 2

## 2022-03-23 LAB
BACTERIA SPEC ANAEROBE+AEROBE CULT: ABNORMAL
GRAM STN SPEC: ABNORMAL

## 2022-03-24 ENCOUNTER — OFFICE VISIT (OUTPATIENT)
Dept: SURGERY | Age: 82
End: 2022-03-24

## 2022-03-24 VITALS
SYSTOLIC BLOOD PRESSURE: 162 MMHG | BODY MASS INDEX: 23.61 KG/M2 | OXYGEN SATURATION: 99 % | HEIGHT: 70 IN | HEART RATE: 73 BPM | WEIGHT: 164.9 LBS | DIASTOLIC BLOOD PRESSURE: 80 MMHG

## 2022-03-24 DIAGNOSIS — T14.8XXA HEMATOMA: Primary | ICD-10-CM

## 2022-03-24 PROCEDURE — 10140 I&D HMTMA SEROMA/FLUID COLLJ: CPT

## 2022-03-24 PROCEDURE — 99215 OFFICE O/P EST HI 40 MIN: CPT

## 2022-03-24 ASSESSMENT — PAIN SCALES - GENERAL: PAINLEVEL: 2

## 2022-03-31 ENCOUNTER — OFFICE VISIT (OUTPATIENT)
Dept: SURGERY | Age: 82
End: 2022-03-31

## 2022-03-31 VITALS
HEART RATE: 74 BPM | SYSTOLIC BLOOD PRESSURE: 150 MMHG | OXYGEN SATURATION: 100 % | DIASTOLIC BLOOD PRESSURE: 80 MMHG | HEIGHT: 70 IN | BODY MASS INDEX: 23.66 KG/M2

## 2022-03-31 DIAGNOSIS — T14.8XXA HEMATOMA: Primary | ICD-10-CM

## 2022-03-31 PROCEDURE — 99024 POSTOP FOLLOW-UP VISIT: CPT

## 2022-04-08 ENCOUNTER — EXTERNAL RECORD (OUTPATIENT)
Dept: HEALTH INFORMATION MANAGEMENT | Facility: OTHER | Age: 82
End: 2022-04-08

## 2022-04-09 ENCOUNTER — EXTERNAL RECORD (OUTPATIENT)
Dept: HEALTH INFORMATION MANAGEMENT | Facility: OTHER | Age: 82
End: 2022-04-09

## 2022-04-15 ENCOUNTER — EXTERNAL LAB (OUTPATIENT)
Dept: OTHER | Age: 82
End: 2022-04-15

## 2022-04-19 ENCOUNTER — EXTERNAL RECORD (OUTPATIENT)
Dept: HEALTH INFORMATION MANAGEMENT | Facility: OTHER | Age: 82
End: 2022-04-19

## 2022-05-23 ENCOUNTER — PRIOR ORIGINAL RECORDS (OUTPATIENT)
Dept: HEALTH INFORMATION MANAGEMENT | Facility: OTHER | Age: 82
End: 2022-05-23

## 2022-06-10 ENCOUNTER — TELEPHONE (OUTPATIENT)
Dept: CARDIOLOGY | Age: 82
End: 2022-06-10

## 2022-06-10 DIAGNOSIS — Z11.52 ENCOUNTER FOR PREPROCEDURE SCREENING LABORATORY TESTING FOR COVID-19: Primary | ICD-10-CM

## 2022-06-10 DIAGNOSIS — Z01.812 ENCOUNTER FOR PREPROCEDURE SCREENING LABORATORY TESTING FOR COVID-19: Primary | ICD-10-CM

## 2022-06-15 ASSESSMENT — ACTIVITIES OF DAILY LIVING (ADL)
ADL_SCORE: 12
HISTORY OF FALLING IN THE LAST YEAR (PRIOR TO ADMISSION): NO
SENSORY_SUPPORT_DEVICES: DENTURES
ADL_BEFORE_ADMISSION: INDEPENDENT

## 2022-06-15 ASSESSMENT — COGNITIVE AND FUNCTIONAL STATUS - GENERAL
ARE YOU DEAF OR DO YOU HAVE SERIOUS DIFFICULTY  HEARING: NO
ARE YOU BLIND OR DO YOU HAVE SERIOUS DIFFICULTY SEEING, EVEN WHEN WEARING GLASSES: NO

## 2022-06-17 ENCOUNTER — HOSPITAL ENCOUNTER (OUTPATIENT)
Dept: LAB | Age: 82
Discharge: HOME | End: 2022-06-17
Attending: INTERNAL MEDICINE

## 2022-06-17 DIAGNOSIS — Z01.812 ENCOUNTER FOR PREPROCEDURE SCREENING LABORATORY TESTING FOR COVID-19: ICD-10-CM

## 2022-06-17 DIAGNOSIS — Z11.52 ENCOUNTER FOR PREPROCEDURE SCREENING LABORATORY TESTING FOR COVID-19: ICD-10-CM

## 2022-06-17 LAB
SARS-COV-2 RNA RESP QL NAA+PROBE: NOT DETECTED
SERVICE CMNT-IMP: NORMAL
SERVICE CMNT-IMP: NORMAL

## 2022-06-17 PROCEDURE — U0003 INFECTIOUS AGENT DETECTION BY NUCLEIC ACID (DNA OR RNA); SEVERE ACUTE RESPIRATORY SYNDROME CORONAVIRUS 2 (SARS-COV-2) (CORONAVIRUS DISEASE [COVID-19]), AMPLIFIED PROBE TECHNIQUE, MAKING USE OF HIGH THROUGHPUT TECHNOLOGIES AS DESCRIBED BY CMS-2020-01-R: HCPCS

## 2022-06-20 ENCOUNTER — HOSPITAL ENCOUNTER (OUTPATIENT)
Dept: GASTROENTEROLOGY | Age: 82
Discharge: HOME | End: 2022-06-20
Attending: INTERNAL MEDICINE

## 2022-06-20 ENCOUNTER — ANESTHESIA (OUTPATIENT)
Dept: GASTROENTEROLOGY | Age: 82
End: 2022-06-20

## 2022-06-20 ENCOUNTER — ANESTHESIA EVENT (OUTPATIENT)
Dept: GASTROENTEROLOGY | Age: 82
End: 2022-06-20

## 2022-06-20 VITALS
RESPIRATION RATE: 15 BRPM | HEART RATE: 68 BPM | SYSTOLIC BLOOD PRESSURE: 175 MMHG | DIASTOLIC BLOOD PRESSURE: 81 MMHG | HEIGHT: 69 IN | TEMPERATURE: 98.6 F | OXYGEN SATURATION: 99 % | BODY MASS INDEX: 22.76 KG/M2 | WEIGHT: 153.66 LBS

## 2022-06-20 DIAGNOSIS — R19.5 OTHER FECAL ABNORMALITIES: ICD-10-CM

## 2022-06-20 DIAGNOSIS — K64.9 HEMORRHOIDS, UNSPECIFIED HEMORRHOID TYPE: ICD-10-CM

## 2022-06-20 DIAGNOSIS — K63.5 POLYP OF ASCENDING COLON, UNSPECIFIED TYPE: ICD-10-CM

## 2022-06-20 DIAGNOSIS — K57.90 DIVERTICULOSIS: ICD-10-CM

## 2022-06-20 DIAGNOSIS — K63.5 POLYP OF TRANSVERSE COLON, UNSPECIFIED TYPE: ICD-10-CM

## 2022-06-20 PROCEDURE — 10002801 HB RX 250 W/O HCPCS

## 2022-06-20 PROCEDURE — 13000025 HB GI COMPLEX CASE EACH ADD MINUTE

## 2022-06-20 PROCEDURE — 13000008 HB ANESTHESIA MAC OUTSIDE OR

## 2022-06-20 PROCEDURE — 13000001 HB PHASE II RECOVERY EA 30 MINUTES

## 2022-06-20 PROCEDURE — 10002800 HB RX 250 W HCPCS

## 2022-06-20 PROCEDURE — 10002807 HB RX 258: Performed by: INTERNAL MEDICINE

## 2022-06-20 PROCEDURE — 88305 TISSUE EXAM BY PATHOLOGIST: CPT | Performed by: INTERNAL MEDICINE

## 2022-06-20 PROCEDURE — 10004451 HB PACU RECOVERY 1ST 30 MINUTES

## 2022-06-20 PROCEDURE — 13000024 HB GI COMPLEX CASE S/U + 1ST 15 MIN

## 2022-06-20 RX ORDER — PROPOFOL 10 MG/ML
INJECTION, EMULSION INTRAVENOUS PRN
Status: DISCONTINUED | OUTPATIENT
Start: 2022-06-20 | End: 2022-06-20

## 2022-06-20 RX ORDER — LIDOCAINE HYDROCHLORIDE 20 MG/ML
INJECTION, SOLUTION INFILTRATION; PERINEURAL PRN
Status: DISCONTINUED | OUTPATIENT
Start: 2022-06-20 | End: 2022-06-20

## 2022-06-20 RX ORDER — SODIUM CHLORIDE 9 MG/ML
INJECTION, SOLUTION INTRAVENOUS CONTINUOUS
Status: DISCONTINUED | OUTPATIENT
Start: 2022-06-20 | End: 2022-06-21 | Stop reason: HOSPADM

## 2022-06-20 RX ADMIN — PROPOFOL 50 MG: 10 INJECTION, EMULSION INTRAVENOUS at 09:47

## 2022-06-20 RX ADMIN — LIDOCAINE HYDROCHLORIDE 2 ML: 20 INJECTION, SOLUTION INFILTRATION; PERINEURAL at 09:47

## 2022-06-20 RX ADMIN — PROPOFOL 75 MCG/KG/MIN: 10 INJECTION, EMULSION INTRAVENOUS at 09:47

## 2022-06-20 RX ADMIN — SODIUM CHLORIDE: 9 INJECTION, SOLUTION INTRAVENOUS at 09:07

## 2022-06-20 ASSESSMENT — PAIN SCALES - WONG BAKER: WONGBAKER_NUMERICALRESPONSE: 0

## 2022-06-20 ASSESSMENT — ENCOUNTER SYMPTOMS
WHEEZING: 0
DIARRHEA: 0
CONSTIPATION: 0
VOMITING: 0
BACK PAIN: 0
SHORTNESS OF BREATH: 0
DIZZINESS: 0
ABDOMINAL DISTENTION: 0
ABDOMINAL PAIN: 0
CONFUSION: 0
HEADACHES: 0
FEVER: 0
EYE PAIN: 0
AGITATION: 0
BLOOD IN STOOL: 1
NAUSEA: 0
COUGH: 0
TROUBLE SWALLOWING: 0
EYE ITCHING: 0
HALLUCINATIONS: 0
NUMBNESS: 0
CHILLS: 0
FATIGUE: 0
FACIAL SWELLING: 0

## 2022-06-20 ASSESSMENT — PAIN SCALES - GENERAL
PAINLEVEL_OUTOF10: 0

## 2022-06-21 LAB
ASR DISCLAIMER: NORMAL
CASE RPRT: NORMAL
CLINICAL INFO: NORMAL
PATH REPORT.FINAL DX SPEC: NORMAL
PATH REPORT.GROSS SPEC: NORMAL

## 2022-07-01 DIAGNOSIS — Z01.812 PRE-PROCEDURAL LABORATORY EXAMINATION: Primary | ICD-10-CM

## 2022-07-05 RX ORDER — APIXABAN 5 MG/1
5 TABLET, FILM COATED ORAL EVERY 12 HOURS SCHEDULED
Qty: 180 TABLET | Refills: 0 | Status: SHIPPED | OUTPATIENT
Start: 2022-07-05 | End: 2022-07-19 | Stop reason: SDUPTHER

## 2022-07-06 ENCOUNTER — HOSPITAL ENCOUNTER (OUTPATIENT)
Dept: LAB | Age: 82
Discharge: HOME | End: 2022-07-06

## 2022-07-06 DIAGNOSIS — Z01.812 PRE-PROCEDURAL LABORATORY EXAMINATION: ICD-10-CM

## 2022-07-06 PROCEDURE — U0003 INFECTIOUS AGENT DETECTION BY NUCLEIC ACID (DNA OR RNA); SEVERE ACUTE RESPIRATORY SYNDROME CORONAVIRUS 2 (SARS-COV-2) (CORONAVIRUS DISEASE [COVID-19]), AMPLIFIED PROBE TECHNIQUE, MAKING USE OF HIGH THROUGHPUT TECHNOLOGIES AS DESCRIBED BY CMS-2020-01-R: HCPCS

## 2022-07-07 LAB
SARS-COV-2 RNA RESP QL NAA+PROBE: NOT DETECTED
SERVICE CMNT-IMP: NORMAL
SERVICE CMNT-IMP: NORMAL

## 2022-07-08 ENCOUNTER — HOSPITAL ENCOUNTER (OUTPATIENT)
Dept: RESPIRATORY THERAPY | Age: 82
Discharge: HOME | End: 2022-07-08
Attending: SPECIALIST

## 2022-07-08 DIAGNOSIS — R06.02 SHORTNESS OF BREATH: ICD-10-CM

## 2022-07-08 PROCEDURE — 94729 DIFFUSING CAPACITY: CPT

## 2022-07-08 PROCEDURE — 94010 BREATHING CAPACITY TEST: CPT

## 2022-07-08 PROCEDURE — 94726 PLETHYSMOGRAPHY LUNG VOLUMES: CPT

## 2022-07-13 ENCOUNTER — TELEPHONE (OUTPATIENT)
Dept: CARDIOLOGY | Age: 82
End: 2022-07-13

## 2022-07-18 ENCOUNTER — TELEPHONE (OUTPATIENT)
Dept: CARDIOLOGY | Age: 82
End: 2022-07-18

## 2022-07-18 RX ORDER — APIXABAN 5 MG/1
5 TABLET, FILM COATED ORAL EVERY 12 HOURS SCHEDULED
Qty: 180 TABLET | Refills: 0 | Status: CANCELLED | OUTPATIENT
Start: 2022-07-18

## 2022-07-19 RX ORDER — APIXABAN 5 MG/1
5 TABLET, FILM COATED ORAL EVERY 12 HOURS SCHEDULED
Qty: 180 TABLET | Refills: 2 | Status: SHIPPED | OUTPATIENT
Start: 2022-07-19 | End: 2023-06-20 | Stop reason: SDUPTHER

## 2022-07-28 ENCOUNTER — WALK IN (OUTPATIENT)
Dept: URGENT CARE | Age: 82
End: 2022-07-28
Attending: FAMILY MEDICINE

## 2022-07-28 VITALS
RESPIRATION RATE: 16 BRPM | TEMPERATURE: 97.9 F | HEART RATE: 69 BPM | OXYGEN SATURATION: 98 % | SYSTOLIC BLOOD PRESSURE: 143 MMHG | DIASTOLIC BLOOD PRESSURE: 57 MMHG

## 2022-07-28 DIAGNOSIS — Z20.822 EXPOSURE TO COVID-19 VIRUS: ICD-10-CM

## 2022-07-28 DIAGNOSIS — R05.9 COUGH: Primary | ICD-10-CM

## 2022-07-28 LAB
FLUAV RNA RESP QL NAA+PROBE: NOT DETECTED
FLUBV RNA RESP QL NAA+PROBE: NOT DETECTED
RSV AG NPH QL IA.RAPID: NOT DETECTED
SARS-COV-2 RNA RESP QL NAA+PROBE: NOT DETECTED

## 2022-07-28 PROCEDURE — 99212 OFFICE O/P EST SF 10 MIN: CPT

## 2022-07-28 PROCEDURE — 0241U POCT COVID/FLU/RSV PANEL: CPT | Performed by: FAMILY MEDICINE

## 2022-07-28 PROCEDURE — C9803 HOPD COVID-19 SPEC COLLECT: HCPCS

## 2022-07-28 ASSESSMENT — ENCOUNTER SYMPTOMS
SINUS PRESSURE: 0
DIARRHEA: 0
TROUBLE SWALLOWING: 0
VOMITING: 0
HEADACHES: 0
WEAKNESS: 0
ABDOMINAL PAIN: 0
FEVER: 0
SHORTNESS OF BREATH: 0
ADENOPATHY: 0
NAUSEA: 0
COUGH: 1
AGITATION: 0
EYE REDNESS: 0
DIZZINESS: 0

## 2022-07-28 ASSESSMENT — PAIN SCALES - GENERAL
PAINLEVEL_OUTOF10: 0
PAINLEVEL_OUTOF10: 0
PAINLEVEL: 0

## 2022-08-08 ENCOUNTER — TELEPHONE (OUTPATIENT)
Dept: CARDIOLOGY | Age: 82
End: 2022-08-08

## 2022-08-08 DIAGNOSIS — Z95.0 PRESENCE OF CARDIAC PACEMAKER: Primary | ICD-10-CM

## 2022-08-08 RX ORDER — AMIODARONE HYDROCHLORIDE 200 MG/1
200 TABLET ORAL NIGHTLY
Qty: 90 TABLET | Refills: 3 | Status: SHIPPED | OUTPATIENT
Start: 2022-08-08 | End: 2022-11-23 | Stop reason: DRUGHIGH

## 2022-09-12 ENCOUNTER — HOSPITAL ENCOUNTER (OUTPATIENT)
Dept: LAB | Age: 82
Discharge: HOME | End: 2022-09-12
Attending: UROLOGY

## 2022-09-12 DIAGNOSIS — C61 MALIGNANT NEOPLASM OF PROSTATE (CMD): Primary | ICD-10-CM

## 2022-09-12 DIAGNOSIS — C61 MALIGNANT NEOPLASM OF PROSTATE (CMD): ICD-10-CM

## 2022-09-12 LAB — PSA SERPL-MCNC: 0.01 NG/ML

## 2022-09-12 PROCEDURE — 84153 ASSAY OF PSA TOTAL: CPT | Performed by: UROLOGY

## 2022-09-12 PROCEDURE — 36415 COLL VENOUS BLD VENIPUNCTURE: CPT | Performed by: UROLOGY

## 2022-10-13 ENCOUNTER — HOSPITAL ENCOUNTER (OUTPATIENT)
Dept: MRI IMAGING | Facility: HOSPITAL | Age: 82
Discharge: HOME | End: 2022-10-13
Attending: INTERNAL MEDICINE
Payer: MEDICARE

## 2022-10-13 ENCOUNTER — HOSPITAL ENCOUNTER (OUTPATIENT)
Dept: GENERAL RADIOLOGY | Facility: HOSPITAL | Age: 82
Discharge: HOME | End: 2022-10-13
Attending: INTERNAL MEDICINE
Payer: MEDICARE

## 2022-10-13 VITALS — HEART RATE: 80 BPM | OXYGEN SATURATION: 95 % | SYSTOLIC BLOOD PRESSURE: 127 MMHG | DIASTOLIC BLOOD PRESSURE: 65 MMHG

## 2022-10-13 DIAGNOSIS — M48.061 LUMBAR SPINAL STENOSIS: ICD-10-CM

## 2022-10-13 DIAGNOSIS — M25.572 LEFT ANKLE PAIN: ICD-10-CM

## 2022-10-13 PROCEDURE — 73610 X-RAY EXAM OF ANKLE: CPT | Performed by: INTERNAL MEDICINE

## 2022-10-13 PROCEDURE — 72148 MRI LUMBAR SPINE W/O DYE: CPT | Performed by: INTERNAL MEDICINE

## 2022-10-13 NOTE — IMAGING NOTE
Pt post MR and pacer changed to MR settings per order. Tolerated scan well. Denies any complaints. Back to original settings and discharged to home.

## 2022-10-19 ENCOUNTER — OFFICE VISIT (OUTPATIENT)
Dept: CARDIOLOGY | Age: 82
End: 2022-10-19

## 2022-10-19 VITALS
HEIGHT: 69 IN | HEART RATE: 71 BPM | DIASTOLIC BLOOD PRESSURE: 72 MMHG | BODY MASS INDEX: 24.49 KG/M2 | SYSTOLIC BLOOD PRESSURE: 133 MMHG | WEIGHT: 165.34 LBS

## 2022-10-19 DIAGNOSIS — Z79.01 LONG TERM (CURRENT) USE OF ANTICOAGULANTS: ICD-10-CM

## 2022-10-19 DIAGNOSIS — Z86.73 HISTORY OF CARDIOEMBOLIC CEREBROVASCULAR ACCIDENT (CVA): ICD-10-CM

## 2022-10-19 DIAGNOSIS — Z95.0 PRESENCE OF CARDIAC PACEMAKER: ICD-10-CM

## 2022-10-19 DIAGNOSIS — Z95.1 S/P CABG (CORONARY ARTERY BYPASS GRAFT): ICD-10-CM

## 2022-10-19 DIAGNOSIS — I25.10 CORONARY ARTERY DISEASE INVOLVING NATIVE CORONARY ARTERY OF NATIVE HEART WITHOUT ANGINA PECTORIS: Primary | ICD-10-CM

## 2022-10-19 DIAGNOSIS — I48.0 PAROXYSMAL ATRIAL FIBRILLATION (CMD): ICD-10-CM

## 2022-10-19 DIAGNOSIS — I10 PRIMARY HYPERTENSION: ICD-10-CM

## 2022-10-19 DIAGNOSIS — E78.5 DYSLIPIDEMIA: ICD-10-CM

## 2022-10-19 PROBLEM — I07.1 TRICUSPID REGURGITATION: Status: ACTIVE | Noted: 2022-10-19

## 2022-10-19 PROCEDURE — 3078F DIAST BP <80 MM HG: CPT | Performed by: SPECIALIST

## 2022-10-19 PROCEDURE — 3075F SYST BP GE 130 - 139MM HG: CPT | Performed by: SPECIALIST

## 2022-10-19 PROCEDURE — 99214 OFFICE O/P EST MOD 30 MIN: CPT | Performed by: SPECIALIST

## 2022-10-19 RX ORDER — CHOLECALCIFEROL (VITAMIN D3) 10(400)/ML
10 DROPS ORAL DAILY
COMMUNITY

## 2022-10-19 RX ORDER — AMPICILLIN TRIHYDRATE 250 MG
100 CAPSULE ORAL 2 TIMES DAILY
COMMUNITY

## 2022-10-19 SDOH — HEALTH STABILITY: PHYSICAL HEALTH: ON AVERAGE, HOW MANY MINUTES DO YOU ENGAGE IN EXERCISE AT THIS LEVEL?: 60 MIN

## 2022-10-19 SDOH — HEALTH STABILITY: PHYSICAL HEALTH: ON AVERAGE, HOW MANY DAYS PER WEEK DO YOU ENGAGE IN MODERATE TO STRENUOUS EXERCISE (LIKE A BRISK WALK)?: 7 DAYS

## 2022-10-19 ASSESSMENT — PATIENT HEALTH QUESTIONNAIRE - PHQ9
CLINICAL INTERPRETATION OF PHQ2 SCORE: NO FURTHER SCREENING NEEDED
1. LITTLE INTEREST OR PLEASURE IN DOING THINGS: NOT AT ALL
SUM OF ALL RESPONSES TO PHQ9 QUESTIONS 1 AND 2: 0
SUM OF ALL RESPONSES TO PHQ9 QUESTIONS 1 AND 2: 0
2. FEELING DOWN, DEPRESSED OR HOPELESS: NOT AT ALL

## 2022-10-23 LAB
1,25 (OH) 2 VITAMIN D2: <8 PG/ML
1,25 (OH) 2 VITAMIN D3: 27 PG/ML
ALBUMIN SERPL-MCNC: 4.5 G/DL (ref 3.6–5.1)
ALBUMIN/GLOB SERPL: 2.3 (CALC) (ref 1–2.5)
ALP SERPL-CCNC: 54 U/L (ref 35–144)
ALT SERPL-CCNC: 20 U/L (ref 9–46)
AST SERPL-CCNC: 22 U/L (ref 10–35)
BASOPHILS # BLD: 9 CELLS/UL (ref 0–200)
BASOPHILS NFR BLD: 0.2 %
BILIRUB SERPL-MCNC: 1.2 MG/DL (ref 0.2–1.2)
BUN SERPL-MCNC: 19 MG/DL (ref 7–25)
BUN/CREAT SERPL: NORMAL (CALC) (ref 6–22)
CALCIUM SERPL-MCNC: 9.8 MG/DL (ref 8.6–10.3)
CHLORIDE SERPL-SCNC: 104 MMOL/L (ref 98–110)
CHOLEST SERPL-MCNC: 125 MG/DL
CHOLEST/HDLC SERPL: 2.3 CALC
CO2 SERPL-SCNC: 29 MMOL/L (ref 20–32)
CREAT SERPL-MCNC: 0.75 MG/DL (ref 0.7–1.11)
CREAT UR-MCNC: 56 MG/DL (ref 20–320)
EOSINOPHIL # BLD: 159 CELLS/UL (ref 15–500)
EOSINOPHIL NFR BLD: 3.7 %
ERYTHROCYTE [DISTWIDTH] IN BLOOD: 13.1 % (ref 11–15)
FERRITIN SERPL-MCNC: 28 NG/ML (ref 24–380)
GLOBULIN SER-MCNC: 2 G/DL (CALC) (ref 1.9–3.7)
GLUCOSE SERPL-MCNC: 97 MG/DL (ref 65–99)
HBA1C MFR BLD: 5.9 % OF TOTAL HGB
HCT VFR BLD CALC: 37.7 % (ref 38.5–50)
HDLC SERPL-MCNC: 54 MG/DL
HGB BLD-MCNC: 12.5 G/DL (ref 13.2–17.1)
IRON SATN MFR SERPL: 26 % (CALC) (ref 20–48)
IRON SERPL-MCNC: 102 MCG/DL (ref 50–180)
LDLC SERPL CALC-MCNC: 59 MG/DL (CALC)
LENGTH OF FAST TIME PATIENT: YES H
LENGTH OF FAST TIME PATIENT: YES H
LYMPHOCYTES # BLD: 1896 CELLS/UL (ref 850–3900)
LYMPHOCYTES NFR BLD: 44.1 %
MCH RBC QN AUTO: 30 PG (ref 27–33)
MCHC RBC AUTO-ENTMCNC: 33.2 G/DL (ref 32–36)
MCV RBC AUTO: 90.6 FL (ref 80–100)
MICROALBUMIN UR-MCNC: 0.2 MG/DL
MICROALBUMIN/CREAT UR: 4 MCG/MG CREAT
MONOCYTES # BLD: 864 CELLS/UL (ref 200–950)
MONOCYTES NFR BLD: 20.1 %
MPV (OFFPRE2): 11.5 FL (ref 7.5–12.5)
NEUTROPHILS # BLD: 1372 CELLS/UL (ref 1500–7800)
NEUTROPHILS NFR BLD: 31.9 %
NONHDLC SERPL-MCNC: 71 MG/DL (CALC)
PLATELET # BLD: 202 THOUSAND/UL (ref 140–400)
POTASSIUM SERPL-SCNC: 4.1 MMOL/L (ref 3.5–5.3)
PROT SERPL-MCNC: 6.5 G/DL (ref 6.1–8.1)
PSA SERPL-MCNC: <0.04 NG/ML
RBC # BLD: 4.16 MILLION/UL (ref 4.2–5.8)
SODIUM SERPL-SCNC: 136 MMOL/L (ref 135–146)
TIBC SERPL-MCNC: 388 MCG/DL (CALC) (ref 250–425)
TRIG/HDL: 0.8 CALC
TRIGL SERPL-MCNC: 41 MG/DL
TSH SERPL-ACNC: 0.21 MIU/L (ref 0.4–4.5)
VITAMIN D 1 25 DIHYDROXY (125VD): 27 PG/ML (ref 18–72)
WBC # BLD: 4.3 THOUSAND/UL (ref 3.8–10.8)

## 2022-11-07 RX ORDER — ENALAPRIL MALEATE 10 MG/1
15 TABLET ORAL 2 TIMES DAILY
Qty: 270 TABLET | Refills: 1 | Status: SHIPPED | OUTPATIENT
Start: 2022-11-07 | End: 2023-05-02 | Stop reason: SDUPTHER

## 2022-11-07 RX ORDER — METOPROLOL SUCCINATE 25 MG/1
25 TABLET, EXTENDED RELEASE ORAL NIGHTLY
Qty: 90 TABLET | Refills: 1 | Status: SHIPPED | OUTPATIENT
Start: 2022-11-07 | End: 2023-03-09 | Stop reason: DRUGHIGH

## 2022-11-16 ENCOUNTER — CLINICAL ABSTRACT (OUTPATIENT)
Dept: HEALTH INFORMATION MANAGEMENT | Facility: OTHER | Age: 82
End: 2022-11-16

## 2022-11-17 ENCOUNTER — ANCILLARY PROCEDURE (OUTPATIENT)
Dept: CARDIOLOGY | Age: 82
End: 2022-11-17
Attending: SPECIALIST

## 2022-11-17 DIAGNOSIS — Z86.73 HISTORY OF CARDIOEMBOLIC CEREBROVASCULAR ACCIDENT (CVA): ICD-10-CM

## 2022-11-17 DIAGNOSIS — I10 PRIMARY HYPERTENSION: ICD-10-CM

## 2022-11-17 DIAGNOSIS — I48.0 PAROXYSMAL ATRIAL FIBRILLATION (CMD): ICD-10-CM

## 2022-11-17 DIAGNOSIS — I25.10 CORONARY ARTERY DISEASE INVOLVING NATIVE CORONARY ARTERY OF NATIVE HEART WITHOUT ANGINA PECTORIS: ICD-10-CM

## 2022-11-17 DIAGNOSIS — E78.5 DYSLIPIDEMIA: ICD-10-CM

## 2022-11-17 DIAGNOSIS — Z95.0 PRESENCE OF CARDIAC PACEMAKER: ICD-10-CM

## 2022-11-17 DIAGNOSIS — Z79.01 LONG TERM (CURRENT) USE OF ANTICOAGULANTS: ICD-10-CM

## 2022-11-17 DIAGNOSIS — Z95.1 S/P CABG (CORONARY ARTERY BYPASS GRAFT): ICD-10-CM

## 2022-11-18 PROCEDURE — 93226 XTRNL ECG REC<48 HR SCAN A/R: CPT | Performed by: INTERNAL MEDICINE

## 2022-11-18 PROCEDURE — 93227 XTRNL ECG REC<48 HR R&I: CPT | Performed by: INTERNAL MEDICINE

## 2022-11-23 ENCOUNTER — TELEPHONE (OUTPATIENT)
Dept: CARDIOLOGY | Age: 82
End: 2022-11-23

## 2022-11-23 RX ORDER — AMIODARONE HYDROCHLORIDE 100 MG/1
TABLET ORAL
Qty: 132 TABLET | Refills: 2 | Status: SHIPPED | OUTPATIENT
Start: 2022-11-23 | End: 2023-03-09 | Stop reason: DRUGHIGH

## 2022-12-13 ENCOUNTER — APPOINTMENT (OUTPATIENT)
Dept: ULTRASOUND IMAGING | Age: 82
End: 2022-12-13

## 2022-12-13 ENCOUNTER — HOSPITAL ENCOUNTER (EMERGENCY)
Age: 82
Discharge: HOME | End: 2022-12-13
Attending: EMERGENCY MEDICINE

## 2022-12-13 VITALS
OXYGEN SATURATION: 100 % | RESPIRATION RATE: 18 BRPM | DIASTOLIC BLOOD PRESSURE: 81 MMHG | BODY MASS INDEX: 24.09 KG/M2 | HEART RATE: 75 BPM | SYSTOLIC BLOOD PRESSURE: 152 MMHG | TEMPERATURE: 97.7 F | WEIGHT: 163.14 LBS

## 2022-12-13 DIAGNOSIS — R22.42 LOCALIZED SWELLING OF LEFT LOWER LEG: Primary | ICD-10-CM

## 2022-12-13 PROCEDURE — 93971 EXTREMITY STUDY: CPT

## 2022-12-13 PROCEDURE — 99283 EMERGENCY DEPT VISIT LOW MDM: CPT

## 2022-12-13 ASSESSMENT — ENCOUNTER SYMPTOMS
FEVER: 0
SHORTNESS OF BREATH: 0
ABDOMINAL PAIN: 0
VOMITING: 0
COUGH: 0
CHILLS: 0
NUMBNESS: 0
DIZZINESS: 0

## 2022-12-13 ASSESSMENT — PAIN SCALES - GENERAL: PAINLEVEL_OUTOF10: 0

## 2022-12-14 ENCOUNTER — CLINICAL ABSTRACT (OUTPATIENT)
Dept: FAMILY MEDICINE | Age: 82
End: 2022-12-14

## 2022-12-19 ENCOUNTER — CLINICAL ABSTRACT (OUTPATIENT)
Dept: OTHER | Age: 82
End: 2022-12-19

## 2022-12-19 VITALS — DIASTOLIC BLOOD PRESSURE: 75 MMHG | SYSTOLIC BLOOD PRESSURE: 137 MMHG

## 2022-12-20 ENCOUNTER — HOSPITAL ENCOUNTER (OUTPATIENT)
Dept: GENERAL RADIOLOGY | Age: 82
Discharge: HOME | End: 2022-12-20
Attending: INTERNAL MEDICINE

## 2022-12-20 ENCOUNTER — HOSPITAL ENCOUNTER (OUTPATIENT)
Dept: LAB | Age: 82
Discharge: HOME | End: 2022-12-20
Attending: INTERNAL MEDICINE

## 2022-12-20 DIAGNOSIS — R05.9 COUGH: ICD-10-CM

## 2022-12-20 DIAGNOSIS — I50.9 CHF EXACERBATION (CMD): ICD-10-CM

## 2022-12-20 DIAGNOSIS — R06.02 SOB (SHORTNESS OF BREATH): Primary | ICD-10-CM

## 2022-12-20 DIAGNOSIS — R60.0 LEG EDEMA: ICD-10-CM

## 2022-12-20 DIAGNOSIS — R06.02 SOB (SHORTNESS OF BREATH): ICD-10-CM

## 2022-12-20 DIAGNOSIS — I50.9 CHF (CONGESTIVE HEART FAILURE) (CMD): ICD-10-CM

## 2022-12-20 LAB
ALBUMIN SERPL-MCNC: 3.6 G/DL (ref 3.6–5.1)
ALBUMIN/GLOB SERPL: 1.2 {RATIO} (ref 1–2.4)
ALP SERPL-CCNC: 69 UNITS/L (ref 45–117)
ALT SERPL-CCNC: 31 UNITS/L
ANION GAP SERPL CALC-SCNC: 9 MMOL/L (ref 7–19)
AST SERPL-CCNC: 21 UNITS/L
BASOPHILS # BLD: 0 K/MCL (ref 0–0.3)
BASOPHILS NFR BLD: 0 %
BILIRUB SERPL-MCNC: 0.7 MG/DL (ref 0.2–1)
BUN SERPL-MCNC: 23 MG/DL (ref 6–20)
BUN/CREAT SERPL: 29 (ref 7–25)
CALCIUM SERPL-MCNC: 9.3 MG/DL (ref 8.4–10.2)
CHLORIDE SERPL-SCNC: 106 MMOL/L (ref 97–110)
CO2 SERPL-SCNC: 29 MMOL/L (ref 21–32)
CREAT SERPL-MCNC: 0.8 MG/DL (ref 0.67–1.17)
DEPRECATED RDW RBC: 49.9 FL (ref 39–50)
EOSINOPHIL # BLD: 0.3 K/MCL (ref 0–0.5)
EOSINOPHIL NFR BLD: 4 %
ERYTHROCYTE [DISTWIDTH] IN BLOOD: 14.4 % (ref 11–15)
FASTING DURATION TIME PATIENT: ABNORMAL H
GFR SERPLBLD BASED ON 1.73 SQ M-ARVRAT: 87 ML/MIN
GLOBULIN SER-MCNC: 3.1 G/DL (ref 2–4)
GLUCOSE SERPL-MCNC: 126 MG/DL (ref 70–99)
HCT VFR BLD CALC: 36.5 % (ref 39–51)
HGB BLD-MCNC: 11.3 G/DL (ref 13–17)
IMM GRANULOCYTES # BLD AUTO: 0 K/MCL (ref 0–0.2)
IMM GRANULOCYTES # BLD: 1 %
LYMPHOCYTES # BLD: 2 K/MCL (ref 1–4)
LYMPHOCYTES NFR BLD: 24 %
MCH RBC QN AUTO: 29 PG (ref 26–34)
MCHC RBC AUTO-ENTMCNC: 31 G/DL (ref 32–36.5)
MCV RBC AUTO: 93.6 FL (ref 78–100)
MONOCYTES # BLD: 1.7 K/MCL (ref 0.3–0.9)
MONOCYTES NFR BLD: 21 %
NEUTROPHILS # BLD: 4.3 K/MCL (ref 1.8–7.7)
NEUTROPHILS NFR BLD: 50 %
NRBC BLD MANUAL-RTO: 0 /100 WBC
NT-PROBNP SERPL-MCNC: 681 PG/ML
PLATELET # BLD AUTO: 208 K/MCL (ref 140–450)
POTASSIUM SERPL-SCNC: 4.4 MMOL/L (ref 3.4–5.1)
PROT SERPL-MCNC: 6.7 G/DL (ref 6.4–8.2)
RBC # BLD: 3.9 MIL/MCL (ref 4.5–5.9)
SODIUM SERPL-SCNC: 140 MMOL/L (ref 135–145)
WBC # BLD: 8.4 K/MCL (ref 4.2–11)

## 2022-12-20 PROCEDURE — 83880 ASSAY OF NATRIURETIC PEPTIDE: CPT | Performed by: CLINICAL MEDICAL LABORATORY

## 2022-12-20 PROCEDURE — 71046 X-RAY EXAM CHEST 2 VIEWS: CPT

## 2022-12-20 PROCEDURE — 85025 COMPLETE CBC W/AUTO DIFF WBC: CPT | Performed by: CLINICAL MEDICAL LABORATORY

## 2022-12-20 PROCEDURE — 36415 COLL VENOUS BLD VENIPUNCTURE: CPT | Performed by: CLINICAL MEDICAL LABORATORY

## 2022-12-20 PROCEDURE — 80053 COMPREHEN METABOLIC PANEL: CPT | Performed by: CLINICAL MEDICAL LABORATORY

## 2023-01-23 ENCOUNTER — ANCILLARY PROCEDURE (OUTPATIENT)
Dept: CARDIOLOGY | Age: 83
End: 2023-01-23
Attending: SPECIALIST

## 2023-01-23 VITALS — DIASTOLIC BLOOD PRESSURE: 70 MMHG | HEART RATE: 78 BPM | SYSTOLIC BLOOD PRESSURE: 134 MMHG

## 2023-01-23 DIAGNOSIS — Z95.0 PRESENCE OF CARDIAC PACEMAKER: ICD-10-CM

## 2023-01-23 PROCEDURE — 93280 PM DEVICE PROGR EVAL DUAL: CPT | Performed by: INTERNAL MEDICINE

## 2023-01-30 ENCOUNTER — TELEPHONE (OUTPATIENT)
Dept: CARDIOLOGY | Age: 83
End: 2023-01-30

## 2023-02-22 ENCOUNTER — ANCILLARY PROCEDURE (OUTPATIENT)
Dept: CARDIOLOGY | Age: 83
End: 2023-02-22
Attending: INTERNAL MEDICINE

## 2023-02-22 ENCOUNTER — OFFICE VISIT (OUTPATIENT)
Dept: CARDIOLOGY | Age: 83
End: 2023-02-22

## 2023-02-22 VITALS
DIASTOLIC BLOOD PRESSURE: 69 MMHG | BODY MASS INDEX: 24.16 KG/M2 | HEIGHT: 69 IN | SYSTOLIC BLOOD PRESSURE: 127 MMHG | WEIGHT: 163.14 LBS | HEART RATE: 73 BPM

## 2023-02-22 DIAGNOSIS — I34.0 NONRHEUMATIC MITRAL VALVE REGURGITATION: ICD-10-CM

## 2023-02-22 DIAGNOSIS — I36.1 NONRHEUMATIC TRICUSPID VALVE REGURGITATION: ICD-10-CM

## 2023-02-22 DIAGNOSIS — I48.0 PAROXYSMAL ATRIAL FIBRILLATION (CMD): ICD-10-CM

## 2023-02-22 DIAGNOSIS — Z95.0 CARDIAC PACEMAKER IN SITU: ICD-10-CM

## 2023-02-22 DIAGNOSIS — Z79.01 LONG TERM CURRENT USE OF ANTICOAGULANT THERAPY: Primary | ICD-10-CM

## 2023-02-22 DIAGNOSIS — R06.02 SOB (SHORTNESS OF BREATH): ICD-10-CM

## 2023-02-22 DIAGNOSIS — Z95.1 S/P CABG (CORONARY ARTERY BYPASS GRAFT): ICD-10-CM

## 2023-02-22 DIAGNOSIS — Z86.73 HISTORY OF CARDIOEMBOLIC CEREBROVASCULAR ACCIDENT (CVA): ICD-10-CM

## 2023-02-22 DIAGNOSIS — E78.5 DYSLIPIDEMIA: ICD-10-CM

## 2023-02-22 DIAGNOSIS — I25.810 CORONARY ARTERY DISEASE INVOLVING CORONARY BYPASS GRAFT OF NATIVE HEART WITHOUT ANGINA PECTORIS: ICD-10-CM

## 2023-02-22 DIAGNOSIS — I10 PRIMARY HYPERTENSION: ICD-10-CM

## 2023-02-22 PROCEDURE — 99214 OFFICE O/P EST MOD 30 MIN: CPT | Performed by: SPECIALIST

## 2023-02-22 PROCEDURE — 3074F SYST BP LT 130 MM HG: CPT | Performed by: SPECIALIST

## 2023-02-22 PROCEDURE — 3078F DIAST BP <80 MM HG: CPT | Performed by: SPECIALIST

## 2023-02-22 RX ORDER — FUROSEMIDE 20 MG/1
20 TABLET ORAL DAILY
COMMUNITY
Start: 2023-02-11 | End: 2023-08-10

## 2023-02-22 SDOH — HEALTH STABILITY: PHYSICAL HEALTH: ON AVERAGE, HOW MANY DAYS PER WEEK DO YOU ENGAGE IN MODERATE TO STRENUOUS EXERCISE (LIKE A BRISK WALK)?: 6 DAYS

## 2023-02-22 SDOH — HEALTH STABILITY: PHYSICAL HEALTH: ON AVERAGE, HOW MANY MINUTES DO YOU ENGAGE IN EXERCISE AT THIS LEVEL?: 70 MIN

## 2023-02-22 ASSESSMENT — PATIENT HEALTH QUESTIONNAIRE - PHQ9
SUM OF ALL RESPONSES TO PHQ9 QUESTIONS 1 AND 2: 0
2. FEELING DOWN, DEPRESSED OR HOPELESS: NOT AT ALL
1. LITTLE INTEREST OR PLEASURE IN DOING THINGS: NOT AT ALL
CLINICAL INTERPRETATION OF PHQ2 SCORE: NO FURTHER SCREENING NEEDED
SUM OF ALL RESPONSES TO PHQ9 QUESTIONS 1 AND 2: 0

## 2023-03-09 ENCOUNTER — TELEPHONE (OUTPATIENT)
Dept: CARDIOLOGY | Age: 83
End: 2023-03-09

## 2023-03-09 ENCOUNTER — ANCILLARY PROCEDURE (OUTPATIENT)
Dept: CARDIOLOGY | Age: 83
End: 2023-03-09
Attending: INTERNAL MEDICINE

## 2023-03-09 ENCOUNTER — HOSPITAL ENCOUNTER (OUTPATIENT)
Dept: LAB | Age: 83
Discharge: HOME | End: 2023-03-09
Attending: UROLOGY

## 2023-03-09 DIAGNOSIS — C61 MALIGNANT NEOPLASM OF PROSTATE (CMD): Primary | ICD-10-CM

## 2023-03-09 DIAGNOSIS — Z95.0 CARDIAC PACEMAKER: ICD-10-CM

## 2023-03-09 DIAGNOSIS — C61 MALIGNANT NEOPLASM OF PROSTATE (CMD): ICD-10-CM

## 2023-03-09 LAB
MDC_IDC_LEAD_IMPLANT_DT: NORMAL
MDC_IDC_LEAD_IMPLANT_DT: NORMAL
MDC_IDC_LEAD_LOCATION: NORMAL
MDC_IDC_LEAD_LOCATION: NORMAL
MDC_IDC_LEAD_LOCATION_DETAIL_1: NORMAL
MDC_IDC_LEAD_LOCATION_DETAIL_1: NORMAL
MDC_IDC_LEAD_MFG: NORMAL
MDC_IDC_LEAD_MFG: NORMAL
MDC_IDC_LEAD_MODEL: NORMAL
MDC_IDC_LEAD_MODEL: NORMAL
MDC_IDC_LEAD_POLARITY_TYPE: NORMAL
MDC_IDC_LEAD_POLARITY_TYPE: NORMAL
MDC_IDC_LEAD_SERIAL: NORMAL
MDC_IDC_LEAD_SERIAL: NORMAL
MDC_IDC_PG_IMPLANT_DTM: NORMAL
MDC_IDC_PG_MFG: NORMAL
MDC_IDC_PG_MODEL: NORMAL
MDC_IDC_PG_SERIAL: NORMAL
MDC_IDC_PG_TYPE: NORMAL
MDC_IDC_SESS_CLINIC_NAME: NORMAL
MDC_IDC_SESS_TYPE: NORMAL
PSA SERPL-MCNC: <0.01 NG/ML

## 2023-03-09 PROCEDURE — 36415 COLL VENOUS BLD VENIPUNCTURE: CPT | Performed by: UROLOGY

## 2023-03-09 PROCEDURE — 84153 ASSAY OF PSA TOTAL: CPT | Performed by: UROLOGY

## 2023-03-09 RX ORDER — AMIODARONE HYDROCHLORIDE 200 MG/1
200 TABLET ORAL DAILY
Qty: 90 TABLET | Refills: 1 | Status: SHIPPED | OUTPATIENT
Start: 2023-03-09

## 2023-03-09 RX ORDER — METOPROLOL SUCCINATE 25 MG/1
25 TABLET, EXTENDED RELEASE ORAL 2 TIMES DAILY
Qty: 180 TABLET | Refills: 1 | Status: SHIPPED | OUTPATIENT
Start: 2023-03-09 | End: 2023-09-29 | Stop reason: SDUPTHER

## 2023-03-11 ENCOUNTER — LAB SERVICES (OUTPATIENT)
Dept: LAB | Age: 83
End: 2023-03-11

## 2023-03-11 DIAGNOSIS — Z79.01 LONG TERM CURRENT USE OF ANTICOAGULANT THERAPY: ICD-10-CM

## 2023-03-11 DIAGNOSIS — Z95.1 S/P CABG (CORONARY ARTERY BYPASS GRAFT): ICD-10-CM

## 2023-03-11 DIAGNOSIS — Z86.73 HISTORY OF CARDIOEMBOLIC CEREBROVASCULAR ACCIDENT (CVA): ICD-10-CM

## 2023-03-11 DIAGNOSIS — I25.810 CORONARY ARTERY DISEASE INVOLVING CORONARY BYPASS GRAFT OF NATIVE HEART WITHOUT ANGINA PECTORIS: ICD-10-CM

## 2023-03-11 DIAGNOSIS — I36.1 NONRHEUMATIC TRICUSPID VALVE REGURGITATION: ICD-10-CM

## 2023-03-11 DIAGNOSIS — I48.0 PAROXYSMAL ATRIAL FIBRILLATION (CMD): ICD-10-CM

## 2023-03-11 DIAGNOSIS — E78.5 DYSLIPIDEMIA: ICD-10-CM

## 2023-03-11 DIAGNOSIS — I10 PRIMARY HYPERTENSION: ICD-10-CM

## 2023-03-11 DIAGNOSIS — I34.0 NONRHEUMATIC MITRAL VALVE REGURGITATION: ICD-10-CM

## 2023-03-11 LAB
ALBUMIN SERPL-MCNC: 4.1 G/DL (ref 3.6–5.1)
ALBUMIN/GLOB SERPL: 1.4 {RATIO} (ref 1–2.4)
ALP SERPL-CCNC: 71 UNITS/L (ref 45–117)
ALT SERPL-CCNC: 21 UNITS/L
ANION GAP SERPL CALC-SCNC: 6 MMOL/L (ref 7–19)
AST SERPL-CCNC: 17 UNITS/L
BASOPHILS # BLD: 0 K/MCL (ref 0–0.3)
BASOPHILS NFR BLD: 0 %
BILIRUB SERPL-MCNC: 1.1 MG/DL (ref 0.2–1)
BUN SERPL-MCNC: 22 MG/DL (ref 6–20)
BUN/CREAT SERPL: 31 (ref 7–25)
CALCIUM SERPL-MCNC: 9.4 MG/DL (ref 8.4–10.2)
CHLORIDE SERPL-SCNC: 105 MMOL/L (ref 97–110)
CHOLEST SERPL-MCNC: 120 MG/DL
CHOLEST/HDLC SERPL: 2.5 {RATIO}
CO2 SERPL-SCNC: 28 MMOL/L (ref 21–32)
CREAT SERPL-MCNC: 0.72 MG/DL (ref 0.67–1.17)
DEPRECATED RDW RBC: 48.9 FL (ref 39–50)
EOSINOPHIL # BLD: 0.2 K/MCL (ref 0–0.5)
EOSINOPHIL NFR BLD: 4 %
ERYTHROCYTE [DISTWIDTH] IN BLOOD: 14.6 % (ref 11–15)
FASTING DURATION TIME PATIENT: 12 HOURS (ref 0–999)
FASTING DURATION TIME PATIENT: 12 HOURS (ref 0–999)
GFR SERPLBLD BASED ON 1.73 SQ M-ARVRAT: 90 ML/MIN
GLOBULIN SER-MCNC: 3 G/DL (ref 2–4)
GLUCOSE SERPL-MCNC: 116 MG/DL (ref 70–99)
HCT VFR BLD CALC: 36 % (ref 39–51)
HDLC SERPL-MCNC: 48 MG/DL
HGB BLD-MCNC: 11.4 G/DL (ref 13–17)
IMM GRANULOCYTES # BLD AUTO: 0 K/MCL (ref 0–0.2)
IMM GRANULOCYTES # BLD: 0 %
LDLC SERPL CALC-MCNC: 61 MG/DL
LYMPHOCYTES # BLD: 1.7 K/MCL (ref 1–4)
LYMPHOCYTES NFR BLD: 32 %
MAGNESIUM SERPL-MCNC: 2.1 MG/DL (ref 1.7–2.4)
MCH RBC QN AUTO: 28.8 PG (ref 26–34)
MCHC RBC AUTO-ENTMCNC: 31.7 G/DL (ref 32–36.5)
MCV RBC AUTO: 90.9 FL (ref 78–100)
MONOCYTES # BLD: 1.3 K/MCL (ref 0.3–0.9)
MONOCYTES NFR BLD: 25 %
NEUTROPHILS # BLD: 2 K/MCL (ref 1.8–7.7)
NEUTROPHILS NFR BLD: 39 %
NONHDLC SERPL-MCNC: 72 MG/DL
NRBC BLD MANUAL-RTO: 0 /100 WBC
PLATELET # BLD AUTO: 183 K/MCL (ref 140–450)
POTASSIUM SERPL-SCNC: 4.3 MMOL/L (ref 3.4–5.1)
PROT SERPL-MCNC: 7.1 G/DL (ref 6.4–8.2)
RBC # BLD: 3.96 MIL/MCL (ref 4.5–5.9)
SODIUM SERPL-SCNC: 135 MMOL/L (ref 135–145)
TRIGL SERPL-MCNC: 56 MG/DL
TSH SERPL-ACNC: 0.37 MCUNITS/ML (ref 0.35–5)
WBC # BLD: 5.2 K/MCL (ref 4.2–11)

## 2023-03-11 PROCEDURE — 80053 COMPREHEN METABOLIC PANEL: CPT | Performed by: INTERNAL MEDICINE

## 2023-03-11 PROCEDURE — 80061 LIPID PANEL: CPT | Performed by: INTERNAL MEDICINE

## 2023-03-11 PROCEDURE — 84443 ASSAY THYROID STIM HORMONE: CPT | Performed by: INTERNAL MEDICINE

## 2023-03-11 PROCEDURE — 83735 ASSAY OF MAGNESIUM: CPT | Performed by: INTERNAL MEDICINE

## 2023-03-11 PROCEDURE — 36415 COLL VENOUS BLD VENIPUNCTURE: CPT | Performed by: INTERNAL MEDICINE

## 2023-03-11 PROCEDURE — 85025 COMPLETE CBC W/AUTO DIFF WBC: CPT | Performed by: INTERNAL MEDICINE

## 2023-03-28 ENCOUNTER — OFFICE VISIT (OUTPATIENT)
Dept: CARDIOLOGY | Age: 83
End: 2023-03-28

## 2023-03-28 VITALS
HEIGHT: 69 IN | HEART RATE: 71 BPM | WEIGHT: 160.94 LBS | BODY MASS INDEX: 23.84 KG/M2 | DIASTOLIC BLOOD PRESSURE: 70 MMHG | SYSTOLIC BLOOD PRESSURE: 125 MMHG

## 2023-03-28 DIAGNOSIS — Z95.1 S/P CABG (CORONARY ARTERY BYPASS GRAFT): ICD-10-CM

## 2023-03-28 DIAGNOSIS — I34.0 NONRHEUMATIC MITRAL VALVE REGURGITATION: ICD-10-CM

## 2023-03-28 DIAGNOSIS — E78.5 DYSLIPIDEMIA: ICD-10-CM

## 2023-03-28 DIAGNOSIS — I10 PRIMARY HYPERTENSION: ICD-10-CM

## 2023-03-28 DIAGNOSIS — I25.810 CORONARY ARTERY DISEASE INVOLVING CORONARY BYPASS GRAFT OF NATIVE HEART WITHOUT ANGINA PECTORIS: Primary | ICD-10-CM

## 2023-03-28 DIAGNOSIS — I48.0 PAROXYSMAL ATRIAL FIBRILLATION (CMD): ICD-10-CM

## 2023-03-28 DIAGNOSIS — Z86.73 HISTORY OF CARDIOEMBOLIC CEREBROVASCULAR ACCIDENT (CVA): ICD-10-CM

## 2023-03-28 DIAGNOSIS — Z79.01 LONG TERM CURRENT USE OF ANTICOAGULANT THERAPY: ICD-10-CM

## 2023-03-28 PROCEDURE — 93000 ELECTROCARDIOGRAM COMPLETE: CPT | Performed by: SPECIALIST

## 2023-03-28 PROCEDURE — 3074F SYST BP LT 130 MM HG: CPT | Performed by: SPECIALIST

## 2023-03-28 PROCEDURE — 3078F DIAST BP <80 MM HG: CPT | Performed by: SPECIALIST

## 2023-03-28 PROCEDURE — 99214 OFFICE O/P EST MOD 30 MIN: CPT | Performed by: SPECIALIST

## 2023-03-28 SDOH — HEALTH STABILITY: PHYSICAL HEALTH: ON AVERAGE, HOW MANY MINUTES DO YOU ENGAGE IN EXERCISE AT THIS LEVEL?: 0 MIN

## 2023-03-28 SDOH — HEALTH STABILITY: PHYSICAL HEALTH: ON AVERAGE, HOW MANY DAYS PER WEEK DO YOU ENGAGE IN MODERATE TO STRENUOUS EXERCISE (LIKE A BRISK WALK)?: 0 DAYS

## 2023-03-28 ASSESSMENT — PATIENT HEALTH QUESTIONNAIRE - PHQ9
SUM OF ALL RESPONSES TO PHQ9 QUESTIONS 1 AND 2: 0
CLINICAL INTERPRETATION OF PHQ2 SCORE: NO FURTHER SCREENING NEEDED
SUM OF ALL RESPONSES TO PHQ9 QUESTIONS 1 AND 2: 0
1. LITTLE INTEREST OR PLEASURE IN DOING THINGS: NOT AT ALL
2. FEELING DOWN, DEPRESSED OR HOPELESS: NOT AT ALL

## 2023-04-07 ENCOUNTER — HOSPITAL ENCOUNTER (OUTPATIENT)
Dept: MRI IMAGING | Age: 83
Discharge: HOME | End: 2023-04-07
Attending: ORTHOPAEDIC SURGERY

## 2023-04-07 DIAGNOSIS — M25.551 RIGHT HIP PAIN: ICD-10-CM

## 2023-04-07 PROCEDURE — 73721 MRI JNT OF LWR EXTRE W/O DYE: CPT

## 2023-05-01 ENCOUNTER — ANCILLARY PROCEDURE (OUTPATIENT)
Dept: CARDIOLOGY | Age: 83
End: 2023-05-01
Attending: SPECIALIST

## 2023-05-01 VITALS — WEIGHT: 165 LBS | BODY MASS INDEX: 24.44 KG/M2 | HEIGHT: 69 IN

## 2023-05-01 DIAGNOSIS — Z95.1 S/P CABG (CORONARY ARTERY BYPASS GRAFT): ICD-10-CM

## 2023-05-01 DIAGNOSIS — Z79.01 LONG TERM CURRENT USE OF ANTICOAGULANT THERAPY: ICD-10-CM

## 2023-05-01 DIAGNOSIS — E78.5 DYSLIPIDEMIA: ICD-10-CM

## 2023-05-01 DIAGNOSIS — I48.0 PAROXYSMAL ATRIAL FIBRILLATION (CMD): ICD-10-CM

## 2023-05-01 DIAGNOSIS — I10 PRIMARY HYPERTENSION: ICD-10-CM

## 2023-05-01 DIAGNOSIS — I34.0 NONRHEUMATIC MITRAL VALVE REGURGITATION: ICD-10-CM

## 2023-05-01 DIAGNOSIS — I36.1 NONRHEUMATIC TRICUSPID VALVE REGURGITATION: ICD-10-CM

## 2023-05-01 DIAGNOSIS — I25.810 CORONARY ARTERY DISEASE INVOLVING CORONARY BYPASS GRAFT OF NATIVE HEART WITHOUT ANGINA PECTORIS: ICD-10-CM

## 2023-05-01 DIAGNOSIS — Z86.73 HISTORY OF CARDIOEMBOLIC CEREBROVASCULAR ACCIDENT (CVA): ICD-10-CM

## 2023-05-01 DIAGNOSIS — R06.02 SOB (SHORTNESS OF BREATH): ICD-10-CM

## 2023-05-01 LAB
AORTIC VALVE AREA (AVA): 0.77
ASCENDING AORTA (AAD): 3
AV PEAK GRADIENT (AVPG): 5
AV PEAK VELOCITY (AVPV): 1.13
AVI LVOT PEAK GRADIENT (LVOTMG): 1.1
E WAVE DECELARATION TIME (MDT): 9.86
HEART RATE RESERVE PREDICTED: 48.15 BPM
INTERVENTRICULAR SEPTUM IN END DIASTOLE (IVSD): 1.89
LEFT INTERNAL DIMENSION IN SYSTOLE (LVSD): 1.1
LEFT VENTRICULAR INTERNAL DIMENSION IN DIASTOLE (LVDD): 3.2
LEFT VENTRICULAR POSTERIOR WALL IN END DIASTOLE (LVPW): 4.5
LV EF: 59 %
LV EF: NORMAL %
LVOT 2D (LVOTD): 30
LVOT VTI (LVOTVTI): 1.24
MV E TISSUE VEL MED (MESV): 9.48
MV E WAVE VEL/E TISSUE VEL MED(MSR): 7.77
MV PEAK A VELOCITY (MVPAV): 222
MV PEAK E VELOCITY (MVPEV): 0.61
RESTING HR ACHIEVED: 69 BPM
RV END SYSTOLIC LONGITUDINAL STRAIN FREE WALL (RVGS): 2.4
STRESS BASELINE BP: NORMAL MMHG
STRESS PEAK HR: 70 BPM
STRESS PERCENT HR: 52 %
STRESS POST EXERCISE DUR MIN: 1 MIN
STRESS POST PEAK BP: NORMAL MMHG
STRESS TARGET HR: 135 BPM
TRICUSPID VALVE ANNULAR PEAK VELOCITY (TVAPV): 21
TRICUSPID VALVE PEAK REGURGITATION VELOCITY (TRPV): 3.7
TV ESTIMATED RIGHT ARTERIAL PRESSURE (RAP): 11.7

## 2023-05-01 PROCEDURE — A9502 TC99M TETROFOSMIN: HCPCS | Performed by: INTERNAL MEDICINE

## 2023-05-01 PROCEDURE — 78452 HT MUSCLE IMAGE SPECT MULT: CPT | Performed by: INTERNAL MEDICINE

## 2023-05-01 PROCEDURE — 76376 3D RENDER W/INTRP POSTPROCES: CPT | Performed by: SPECIALIST

## 2023-05-01 PROCEDURE — 93015 CV STRESS TEST SUPVJ I&R: CPT | Performed by: INTERNAL MEDICINE

## 2023-05-01 PROCEDURE — 93306 TTE W/DOPPLER COMPLETE: CPT | Performed by: SPECIALIST

## 2023-05-01 RX ORDER — REGADENOSON 0.08 MG/ML
0.4 INJECTION, SOLUTION INTRAVENOUS ONCE
Status: COMPLETED | OUTPATIENT
Start: 2023-05-01 | End: 2023-05-01

## 2023-05-01 RX ADMIN — REGADENOSON 0.4 MG: 0.08 INJECTION, SOLUTION INTRAVENOUS at 10:30

## 2023-05-01 ASSESSMENT — EXERCISE STRESS TEST
PEAK_BP: 156/64
PEAK_RPP: 10360
PEAK_RPP: 8970
STAGE_CATEGORIES: RECOVERY 2
PEAK_HR: 69
COMMENTS: REGADENOSON INJECTED
STAGE_CATEGORIES: RECOVERY 0
PEAK_HR: 70
PEAK_BP: 128/60
PEAK_BP: 148/70
STOPPAGE_REASON: PROTOCOL COMPLETE
STAGE_CATEGORIES: RECOVERY 1
PEAK_RPP: 10764
PEAK_RPP: 10360
PEAK_HR: 70
PEAK_HR: 70
PEAK_HR: 69
STAGE_CATEGORIES: 1
PEAK_BP: 148/70
PEAK_BP: 130/70
STAGE_CATEGORIES: RESTING
PEAK_RPP: 8960

## 2023-05-02 RX ORDER — ENALAPRIL MALEATE 10 MG/1
15 TABLET ORAL 2 TIMES DAILY
Qty: 270 TABLET | Refills: 1 | Status: SHIPPED | OUTPATIENT
Start: 2023-05-02 | End: 2023-06-14 | Stop reason: SDUPTHER

## 2023-05-03 ENCOUNTER — ANCILLARY PROCEDURE (OUTPATIENT)
Dept: CARDIOLOGY | Age: 83
End: 2023-05-03
Attending: INTERNAL MEDICINE

## 2023-05-03 DIAGNOSIS — Z95.0 CARDIAC PACEMAKER IN SITU: ICD-10-CM

## 2023-05-03 LAB
MDC_IDC_LEAD_IMPLANT_DT: NORMAL
MDC_IDC_LEAD_IMPLANT_DT: NORMAL
MDC_IDC_LEAD_LOCATION: NORMAL
MDC_IDC_LEAD_LOCATION: NORMAL
MDC_IDC_LEAD_LOCATION_DETAIL_1: NORMAL
MDC_IDC_LEAD_LOCATION_DETAIL_1: NORMAL
MDC_IDC_LEAD_MFG: NORMAL
MDC_IDC_LEAD_MFG: NORMAL
MDC_IDC_LEAD_MODEL: NORMAL
MDC_IDC_LEAD_MODEL: NORMAL
MDC_IDC_LEAD_POLARITY_TYPE: NORMAL
MDC_IDC_LEAD_POLARITY_TYPE: NORMAL
MDC_IDC_LEAD_SERIAL: NORMAL
MDC_IDC_LEAD_SERIAL: NORMAL
MDC_IDC_PG_IMPLANT_DTM: NORMAL
MDC_IDC_PG_MFG: NORMAL
MDC_IDC_PG_MODEL: NORMAL
MDC_IDC_PG_SERIAL: NORMAL
MDC_IDC_PG_TYPE: NORMAL
MDC_IDC_SESS_CLINIC_NAME: NORMAL
MDC_IDC_SESS_TYPE: NORMAL

## 2023-05-04 ENCOUNTER — APPOINTMENT (OUTPATIENT)
Dept: CARDIOLOGY | Age: 83
End: 2023-05-04
Attending: SPECIALIST

## 2023-06-14 ENCOUNTER — OFFICE VISIT (OUTPATIENT)
Dept: CARDIOLOGY | Age: 83
End: 2023-06-14

## 2023-06-14 VITALS
WEIGHT: 163.14 LBS | HEART RATE: 83 BPM | DIASTOLIC BLOOD PRESSURE: 60 MMHG | SYSTOLIC BLOOD PRESSURE: 110 MMHG | BODY MASS INDEX: 24.16 KG/M2 | HEIGHT: 69 IN

## 2023-06-14 DIAGNOSIS — Z86.73 HISTORY OF CARDIOEMBOLIC CEREBROVASCULAR ACCIDENT (CVA): ICD-10-CM

## 2023-06-14 DIAGNOSIS — I48.0 PAROXYSMAL ATRIAL FIBRILLATION (CMD): ICD-10-CM

## 2023-06-14 DIAGNOSIS — I34.0 NONRHEUMATIC MITRAL VALVE REGURGITATION: ICD-10-CM

## 2023-06-14 DIAGNOSIS — I10 PRIMARY HYPERTENSION: ICD-10-CM

## 2023-06-14 DIAGNOSIS — Z95.1 S/P CABG (CORONARY ARTERY BYPASS GRAFT): ICD-10-CM

## 2023-06-14 DIAGNOSIS — E78.5 DYSLIPIDEMIA: ICD-10-CM

## 2023-06-14 DIAGNOSIS — Z79.01 LONG TERM CURRENT USE OF ANTICOAGULANT THERAPY: ICD-10-CM

## 2023-06-14 DIAGNOSIS — I25.10 CORONARY ARTERY DISEASE INVOLVING NATIVE CORONARY ARTERY OF NATIVE HEART WITHOUT ANGINA PECTORIS: Primary | ICD-10-CM

## 2023-06-14 PROCEDURE — 99213 OFFICE O/P EST LOW 20 MIN: CPT | Performed by: SPECIALIST

## 2023-06-14 PROCEDURE — 3074F SYST BP LT 130 MM HG: CPT | Performed by: SPECIALIST

## 2023-06-14 PROCEDURE — 3078F DIAST BP <80 MM HG: CPT | Performed by: SPECIALIST

## 2023-06-14 RX ORDER — CIPROFLOXACIN 500 MG/1
TABLET, FILM COATED ORAL
COMMUNITY
Start: 2023-06-12 | End: 2023-10-26

## 2023-06-14 RX ORDER — ENALAPRIL MALEATE 10 MG/1
10 TABLET ORAL 2 TIMES DAILY
Qty: 180 TABLET | Refills: 1 | Status: SHIPPED | OUTPATIENT
Start: 2023-06-14 | End: 2023-10-27 | Stop reason: SDUPTHER

## 2023-06-14 SDOH — HEALTH STABILITY: PHYSICAL HEALTH: ON AVERAGE, HOW MANY MINUTES DO YOU ENGAGE IN EXERCISE AT THIS LEVEL?: 50 MIN

## 2023-06-14 SDOH — HEALTH STABILITY: PHYSICAL HEALTH: ON AVERAGE, HOW MANY DAYS PER WEEK DO YOU ENGAGE IN MODERATE TO STRENUOUS EXERCISE (LIKE A BRISK WALK)?: 5 DAYS

## 2023-06-14 ASSESSMENT — PATIENT HEALTH QUESTIONNAIRE - PHQ9
2. FEELING DOWN, DEPRESSED OR HOPELESS: NOT AT ALL
SUM OF ALL RESPONSES TO PHQ9 QUESTIONS 1 AND 2: 0
SUM OF ALL RESPONSES TO PHQ9 QUESTIONS 1 AND 2: 0
CLINICAL INTERPRETATION OF PHQ2 SCORE: NO FURTHER SCREENING NEEDED
1. LITTLE INTEREST OR PLEASURE IN DOING THINGS: NOT AT ALL

## 2023-06-20 RX ORDER — APIXABAN 5 MG/1
5 TABLET, FILM COATED ORAL EVERY 12 HOURS SCHEDULED
Qty: 180 TABLET | Refills: 2 | Status: SHIPPED | OUTPATIENT
Start: 2023-06-20

## 2023-07-12 ENCOUNTER — APPOINTMENT (OUTPATIENT)
Dept: CARDIOLOGY | Age: 83
End: 2023-07-12

## 2023-07-25 ENCOUNTER — APPOINTMENT (OUTPATIENT)
Dept: CARDIOLOGY | Age: 83
End: 2023-07-25
Attending: INTERNAL MEDICINE

## 2023-08-08 ENCOUNTER — ANCILLARY PROCEDURE (OUTPATIENT)
Dept: CARDIOLOGY | Age: 83
End: 2023-08-08
Attending: INTERNAL MEDICINE

## 2023-08-08 DIAGNOSIS — Z95.0 PACEMAKER: ICD-10-CM

## 2023-08-10 PROBLEM — I50.9 CONGESTIVE HEART FAILURE (CMD): Status: ACTIVE | Noted: 2023-08-10

## 2023-08-10 PROBLEM — C61 PRIMARY MALIGNANT NEOPLASM OF PROSTATE (CMD): Status: ACTIVE | Noted: 2023-08-10

## 2023-08-10 PROBLEM — I49.5 SICK SINUS SYNDROME (CMD): Status: ACTIVE | Noted: 2023-08-10

## 2023-08-10 PROBLEM — E78.5 HYPERLIPIDEMIA: Status: ACTIVE | Noted: 2023-08-10

## 2023-08-22 ENCOUNTER — OFFICE VISIT (OUTPATIENT)
Dept: URGENT CARE | Age: 83
End: 2023-08-22

## 2023-08-22 VITALS
TEMPERATURE: 98.7 F | HEIGHT: 69 IN | SYSTOLIC BLOOD PRESSURE: 110 MMHG | HEART RATE: 73 BPM | DIASTOLIC BLOOD PRESSURE: 54 MMHG | BODY MASS INDEX: 24.73 KG/M2 | OXYGEN SATURATION: 99 % | WEIGHT: 167 LBS

## 2023-08-22 DIAGNOSIS — H61.23 IMPACTED CERUMEN OF BOTH EARS: Primary | ICD-10-CM

## 2023-08-22 PROBLEM — M48.061 SPINAL STENOSIS OF LUMBAR REGION: Status: ACTIVE | Noted: 2023-08-22

## 2023-08-22 PROCEDURE — 3078F DIAST BP <80 MM HG: CPT | Performed by: NURSE PRACTITIONER

## 2023-08-22 PROCEDURE — 99202 OFFICE O/P NEW SF 15 MIN: CPT | Performed by: NURSE PRACTITIONER

## 2023-08-22 PROCEDURE — 3074F SYST BP LT 130 MM HG: CPT | Performed by: NURSE PRACTITIONER

## 2023-08-22 ASSESSMENT — ENCOUNTER SYMPTOMS
RESPIRATORY NEGATIVE: 1
CONSTITUTIONAL NEGATIVE: 1

## 2023-09-16 ENCOUNTER — HOSPITAL ENCOUNTER (OUTPATIENT)
Dept: LAB | Age: 83
Discharge: HOME | End: 2023-09-16

## 2023-09-16 DIAGNOSIS — C61 MALIGNANT NEOPLASM OF PROSTATE (CMD): Primary | ICD-10-CM

## 2023-09-16 DIAGNOSIS — C61 MALIGNANT NEOPLASM OF PROSTATE (CMD): ICD-10-CM

## 2023-09-16 LAB — PSA SERPL-MCNC: <0.01 NG/ML

## 2023-09-16 PROCEDURE — 84153 ASSAY OF PSA TOTAL: CPT | Performed by: UROLOGY

## 2023-09-16 PROCEDURE — 36415 COLL VENOUS BLD VENIPUNCTURE: CPT | Performed by: UROLOGY

## 2023-09-29 PROBLEM — S00.83XA FOREHEAD CONTUSION: Status: RESOLVED | Noted: 2022-02-23 | Resolved: 2023-09-29

## 2023-09-29 PROBLEM — M70.62 TROCHANTERIC BURSITIS OF LEFT HIP: Status: RESOLVED | Noted: 2018-08-29 | Resolved: 2023-09-29

## 2023-09-29 PROBLEM — J98.8 RESPIRATORY TRACT INFECTION: Status: RESOLVED | Noted: 2023-08-10 | Resolved: 2023-09-29

## 2023-09-29 RX ORDER — METOPROLOL SUCCINATE 25 MG/1
25 TABLET, EXTENDED RELEASE ORAL 2 TIMES DAILY
Qty: 180 TABLET | Refills: 1 | Status: SHIPPED | OUTPATIENT
Start: 2023-09-29

## 2023-10-26 ENCOUNTER — OFFICE VISIT (OUTPATIENT)
Dept: CARDIOLOGY | Age: 83
End: 2023-10-26

## 2023-10-26 VITALS
WEIGHT: 165.34 LBS | SYSTOLIC BLOOD PRESSURE: 125 MMHG | HEART RATE: 80 BPM | BODY MASS INDEX: 24.49 KG/M2 | DIASTOLIC BLOOD PRESSURE: 69 MMHG | HEIGHT: 69 IN

## 2023-10-26 DIAGNOSIS — I48.0 PAROXYSMAL ATRIAL FIBRILLATION (CMD): ICD-10-CM

## 2023-10-26 DIAGNOSIS — Z86.73 HISTORY OF CARDIOEMBOLIC CEREBROVASCULAR ACCIDENT (CVA): ICD-10-CM

## 2023-10-26 DIAGNOSIS — I25.10 CORONARY ARTERY DISEASE INVOLVING NATIVE CORONARY ARTERY OF NATIVE HEART WITHOUT ANGINA PECTORIS: Primary | ICD-10-CM

## 2023-10-26 DIAGNOSIS — Z79.01 LONG TERM CURRENT USE OF ANTICOAGULANT THERAPY: ICD-10-CM

## 2023-10-26 DIAGNOSIS — E78.5 DYSLIPIDEMIA: ICD-10-CM

## 2023-10-26 DIAGNOSIS — Z95.0 CARDIAC PACEMAKER IN SITU: ICD-10-CM

## 2023-10-26 DIAGNOSIS — I10 PRIMARY HYPERTENSION: ICD-10-CM

## 2023-10-26 DIAGNOSIS — Z95.1 S/P CABG (CORONARY ARTERY BYPASS GRAFT): ICD-10-CM

## 2023-10-26 DIAGNOSIS — I34.0 NONRHEUMATIC MITRAL VALVE REGURGITATION: ICD-10-CM

## 2023-10-26 PROCEDURE — 3074F SYST BP LT 130 MM HG: CPT | Performed by: SPECIALIST

## 2023-10-26 PROCEDURE — 3078F DIAST BP <80 MM HG: CPT | Performed by: SPECIALIST

## 2023-10-26 PROCEDURE — 99214 OFFICE O/P EST MOD 30 MIN: CPT | Performed by: SPECIALIST

## 2023-10-26 SDOH — HEALTH STABILITY: PHYSICAL HEALTH: ON AVERAGE, HOW MANY MINUTES DO YOU ENGAGE IN EXERCISE AT THIS LEVEL?: 30 MIN

## 2023-10-26 SDOH — HEALTH STABILITY: PHYSICAL HEALTH: ON AVERAGE, HOW MANY DAYS PER WEEK DO YOU ENGAGE IN MODERATE TO STRENUOUS EXERCISE (LIKE A BRISK WALK)?: 5 DAYS

## 2023-10-26 ASSESSMENT — PATIENT HEALTH QUESTIONNAIRE - PHQ9
2. FEELING DOWN, DEPRESSED OR HOPELESS: NOT AT ALL
SUM OF ALL RESPONSES TO PHQ9 QUESTIONS 1 AND 2: 0
1. LITTLE INTEREST OR PLEASURE IN DOING THINGS: NOT AT ALL
CLINICAL INTERPRETATION OF PHQ2 SCORE: NO FURTHER SCREENING NEEDED
SUM OF ALL RESPONSES TO PHQ9 QUESTIONS 1 AND 2: 0

## 2023-10-27 RX ORDER — ENALAPRIL MALEATE 10 MG/1
10 TABLET ORAL 2 TIMES DAILY
Qty: 180 TABLET | Refills: 3 | Status: SHIPPED | OUTPATIENT
Start: 2023-10-27

## 2023-11-08 ENCOUNTER — HOSPITAL ENCOUNTER (OUTPATIENT)
Dept: RESPIRATORY THERAPY | Age: 83
Discharge: HOME | End: 2023-11-08
Attending: SPECIALIST

## 2023-11-08 DIAGNOSIS — I25.810 CORONARY ARTERY DISEASE INVOLVING CORONARY BYPASS GRAFT OF NATIVE HEART WITHOUT ANGINA PECTORIS: ICD-10-CM

## 2023-11-08 PROCEDURE — 10004180 HB COUNTER-TRANSPORT

## 2023-11-08 PROCEDURE — 94060 EVALUATION OF WHEEZING: CPT

## 2023-11-08 PROCEDURE — 94726 PLETHYSMOGRAPHY LUNG VOLUMES: CPT

## 2023-11-08 PROCEDURE — 94729 DIFFUSING CAPACITY: CPT

## 2023-11-08 PROCEDURE — 10002801 HB RX 250 W/O HCPCS

## 2023-11-08 RX ORDER — IPRATROPIUM BROMIDE AND ALBUTEROL SULFATE 2.5; .5 MG/3ML; MG/3ML
SOLUTION RESPIRATORY (INHALATION)
Status: COMPLETED
Start: 2023-11-08 | End: 2023-11-08

## 2023-11-08 RX ADMIN — IPRATROPIUM BROMIDE AND ALBUTEROL SULFATE 3 ML: 2.5; .5 SOLUTION RESPIRATORY (INHALATION) at 12:58

## 2023-11-13 ENCOUNTER — WALK IN (OUTPATIENT)
Dept: URGENT CARE | Age: 83
End: 2023-11-13
Attending: EMERGENCY MEDICINE

## 2023-11-13 VITALS
DIASTOLIC BLOOD PRESSURE: 63 MMHG | WEIGHT: 167 LBS | OXYGEN SATURATION: 98 % | BODY MASS INDEX: 24.66 KG/M2 | SYSTOLIC BLOOD PRESSURE: 110 MMHG | TEMPERATURE: 97.5 F | HEART RATE: 71 BPM | RESPIRATION RATE: 16 BRPM

## 2023-11-13 DIAGNOSIS — H00.014 HORDEOLUM OF LEFT UPPER EYELID, UNSPECIFIED HORDEOLUM TYPE: Primary | ICD-10-CM

## 2023-11-13 RX ORDER — ERYTHROMYCIN 5 MG/G
OINTMENT OPHTHALMIC EVERY 6 HOURS
Qty: 3.5 G | Refills: 0 | Status: SHIPPED | OUTPATIENT
Start: 2023-11-13 | End: 2023-11-18

## 2023-11-13 ASSESSMENT — PAIN SCALES - GENERAL
PAINLEVEL_OUTOF10: 5
PAINLEVEL: 5

## 2023-11-14 ENCOUNTER — ANCILLARY ORDERS (OUTPATIENT)
Dept: CARDIOLOGY | Age: 83
End: 2023-11-14

## 2023-11-14 ENCOUNTER — ANCILLARY PROCEDURE (OUTPATIENT)
Dept: CARDIOLOGY | Age: 83
End: 2023-11-14
Attending: INTERNAL MEDICINE

## 2023-11-14 DIAGNOSIS — Z95.0 PACEMAKER: ICD-10-CM

## 2023-11-14 PROCEDURE — 93296 REM INTERROG EVL PM/IDS: CPT | Performed by: INTERNAL MEDICINE

## 2023-11-14 PROCEDURE — 93294 REM INTERROG EVL PM/LDLS PM: CPT | Performed by: INTERNAL MEDICINE

## 2023-11-19 ENCOUNTER — HOSPITAL ENCOUNTER (INPATIENT)
Age: 83
LOS: 2 days | Discharge: HOME | End: 2023-11-21
Attending: EMERGENCY MEDICINE | Admitting: INTERNAL MEDICINE

## 2023-11-19 ENCOUNTER — APPOINTMENT (OUTPATIENT)
Dept: GENERAL RADIOLOGY | Age: 83
End: 2023-11-19
Attending: EMERGENCY MEDICINE

## 2023-11-19 DIAGNOSIS — U07.1 COVID-19 VIRUS INFECTION: Primary | ICD-10-CM

## 2023-11-19 LAB
ALBUMIN SERPL-MCNC: 2.8 G/DL (ref 3.6–5.1)
ALBUMIN SERPL-MCNC: 3.4 G/DL (ref 3.6–5.1)
ALBUMIN/GLOB SERPL: 0.9 {RATIO} (ref 1–2.4)
ALBUMIN/GLOB SERPL: 1 {RATIO} (ref 1–2.4)
ALP SERPL-CCNC: 59 UNITS/L (ref 45–117)
ALP SERPL-CCNC: 73 UNITS/L (ref 45–117)
ALT SERPL-CCNC: 27 UNITS/L
ALT SERPL-CCNC: 37 UNITS/L
ANION GAP SERPL CALC-SCNC: 10 MMOL/L (ref 7–19)
ANION GAP SERPL CALC-SCNC: 9 MMOL/L (ref 7–19)
APPEARANCE UR: CLEAR
AST SERPL-CCNC: 16 UNITS/L
AST SERPL-CCNC: 27 UNITS/L
ATRIAL RATE (BPM): 68
BASOPHILS # BLD: 0 K/MCL (ref 0–0.3)
BASOPHILS NFR BLD: 0 %
BILIRUB SERPL-MCNC: 0.4 MG/DL (ref 0.2–1)
BILIRUB SERPL-MCNC: 0.8 MG/DL (ref 0.2–1)
BILIRUB UR QL STRIP: NEGATIVE
BUN SERPL-MCNC: 18 MG/DL (ref 6–20)
BUN SERPL-MCNC: 19 MG/DL (ref 6–20)
BUN/CREAT SERPL: 19 (ref 7–25)
BUN/CREAT SERPL: 20 (ref 7–25)
CALCIUM SERPL-MCNC: 8.8 MG/DL (ref 8.4–10.2)
CALCIUM SERPL-MCNC: 9.3 MG/DL (ref 8.4–10.2)
CHLORIDE SERPL-SCNC: 104 MMOL/L (ref 97–110)
CHLORIDE SERPL-SCNC: 108 MMOL/L (ref 97–110)
CO2 SERPL-SCNC: 24 MMOL/L (ref 21–32)
CO2 SERPL-SCNC: 24 MMOL/L (ref 21–32)
COLOR UR: YELLOW
CREAT SERPL-MCNC: 0.94 MG/DL (ref 0.67–1.17)
CREAT SERPL-MCNC: 0.97 MG/DL (ref 0.67–1.17)
D DIMER PPP FEU-MCNC: 1.11 MG/L (FEU)
DEPRECATED RDW RBC: 49 FL (ref 39–50)
EGFRCR SERPLBLD CKD-EPI 2021: 77 ML/MIN/{1.73_M2}
EGFRCR SERPLBLD CKD-EPI 2021: 79 ML/MIN/{1.73_M2}
EOSINOPHIL # BLD: 0.1 K/MCL (ref 0–0.5)
EOSINOPHIL NFR BLD: 2 %
ERYTHROCYTE [DISTWIDTH] IN BLOOD: 14.8 % (ref 11–15)
FASTING DURATION TIME PATIENT: ABNORMAL H
FASTING DURATION TIME PATIENT: ABNORMAL H
FLUAV RNA RESP QL NAA+PROBE: NOT DETECTED
FLUBV RNA RESP QL NAA+PROBE: NOT DETECTED
GLOBULIN SER-MCNC: 2.9 G/DL (ref 2–4)
GLOBULIN SER-MCNC: 3.6 G/DL (ref 2–4)
GLUCOSE SERPL-MCNC: 141 MG/DL (ref 70–99)
GLUCOSE SERPL-MCNC: 155 MG/DL (ref 70–99)
GLUCOSE UR STRIP-MCNC: NEGATIVE MG/DL
HCT VFR BLD CALC: 32.3 % (ref 39–51)
HGB BLD-MCNC: 10.3 G/DL (ref 13–17)
HGB UR QL STRIP: NEGATIVE
IMM GRANULOCYTES # BLD AUTO: 0.1 K/MCL (ref 0–0.2)
IMM GRANULOCYTES # BLD: 1 %
KETONES UR STRIP-MCNC: NEGATIVE MG/DL
LACTATE BLDV-SCNC: 1.6 MMOL/L (ref 0–2)
LEUKOCYTE ESTERASE UR QL STRIP: NEGATIVE
LYMPHOCYTES # BLD: 1 K/MCL (ref 1–4)
LYMPHOCYTES NFR BLD: 14 %
MCH RBC QN AUTO: 28.7 PG (ref 26–34)
MCHC RBC AUTO-ENTMCNC: 31.9 G/DL (ref 32–36.5)
MCV RBC AUTO: 90 FL (ref 78–100)
MONOCYTES # BLD: 1.9 K/MCL (ref 0.3–0.9)
MONOCYTES NFR BLD: 26 %
NEUTROPHILS # BLD: 4.1 K/MCL (ref 1.8–7.7)
NEUTROPHILS NFR BLD: 57 %
NITRITE UR QL STRIP: NEGATIVE
NRBC BLD MANUAL-RTO: 0 /100 WBC
PH UR STRIP: 5.5 [PH] (ref 5–7)
PLATELET # BLD AUTO: 203 K/MCL (ref 140–450)
POTASSIUM SERPL-SCNC: 3.9 MMOL/L (ref 3.4–5.1)
POTASSIUM SERPL-SCNC: 4.3 MMOL/L (ref 3.4–5.1)
PROCALCITONIN SERPL IA-MCNC: <0.05 NG/ML
PROT SERPL-MCNC: 5.7 G/DL (ref 6.4–8.2)
PROT SERPL-MCNC: 7 G/DL (ref 6.4–8.2)
PROT UR STRIP-MCNC: ABNORMAL MG/DL
QRS-INTERVAL (MSEC): 78
QT-INTERVAL (MSEC): 384
QTC: 432
R AXIS (DEGREES): 61
RAINBOW EXTRA TUBES HOLD SPECIMEN: NORMAL
RBC # BLD: 3.59 MIL/MCL (ref 4.5–5.9)
REPORT TEXT: NORMAL
RSV AG NPH QL IA.RAPID: NOT DETECTED
SARS-COV-2 N GENE CT SPEC QN NAA N2: 18.4
SARS-COV-2 RNA RESP QL NAA+PROBE: DETECTED
SERVICE CMNT-IMP: ABNORMAL
SODIUM SERPL-SCNC: 134 MMOL/L (ref 135–145)
SODIUM SERPL-SCNC: 137 MMOL/L (ref 135–145)
SP GR UR STRIP: 1.02 (ref 1–1.03)
T AXIS (DEGREES): 88
UROBILINOGEN UR STRIP-MCNC: 0.2 MG/DL
VENTRICULAR RATE EKG/MIN (BPM): 76
WBC # BLD: 7.3 K/MCL (ref 4.2–11)

## 2023-11-19 PROCEDURE — 96361 HYDRATE IV INFUSION ADD-ON: CPT

## 2023-11-19 PROCEDURE — 93005 ELECTROCARDIOGRAM TRACING: CPT | Performed by: EMERGENCY MEDICINE

## 2023-11-19 PROCEDURE — C9803 HOPD COVID-19 SPEC COLLECT: HCPCS

## 2023-11-19 PROCEDURE — G0378 HOSPITAL OBSERVATION PER HR: HCPCS

## 2023-11-19 PROCEDURE — 10002803 HB RX 637: Performed by: INTERNAL MEDICINE

## 2023-11-19 PROCEDURE — 84145 PROCALCITONIN (PCT): CPT | Performed by: EMERGENCY MEDICINE

## 2023-11-19 PROCEDURE — 85379 FIBRIN DEGRADATION QUANT: CPT | Performed by: INTERNAL MEDICINE

## 2023-11-19 PROCEDURE — 10002807 HB RX 258: Performed by: EMERGENCY MEDICINE

## 2023-11-19 PROCEDURE — 85025 COMPLETE CBC W/AUTO DIFF WBC: CPT | Performed by: EMERGENCY MEDICINE

## 2023-11-19 PROCEDURE — 10002807 HB RX 258: Performed by: INTERNAL MEDICINE

## 2023-11-19 PROCEDURE — 96374 THER/PROPH/DIAG INJ IV PUSH: CPT

## 2023-11-19 PROCEDURE — 10002800 HB RX 250 W HCPCS: Performed by: INTERNAL MEDICINE

## 2023-11-19 PROCEDURE — 10004651 HB RX, NO CHARGE ITEM: Performed by: INTERNAL MEDICINE

## 2023-11-19 PROCEDURE — 83605 ASSAY OF LACTIC ACID: CPT | Performed by: EMERGENCY MEDICINE

## 2023-11-19 PROCEDURE — 10000002 HB ROOM CHARGE MED SURG

## 2023-11-19 PROCEDURE — 80053 COMPREHEN METABOLIC PANEL: CPT | Performed by: INTERNAL MEDICINE

## 2023-11-19 PROCEDURE — 0241U COVID/FLU/RSV PANEL: CPT | Performed by: EMERGENCY MEDICINE

## 2023-11-19 PROCEDURE — 10004651 HB RX, NO CHARGE ITEM: Performed by: EMERGENCY MEDICINE

## 2023-11-19 PROCEDURE — 81003 URINALYSIS AUTO W/O SCOPE: CPT | Performed by: EMERGENCY MEDICINE

## 2023-11-19 PROCEDURE — 10002803 HB RX 637: Performed by: EMERGENCY MEDICINE

## 2023-11-19 PROCEDURE — 99285 EMERGENCY DEPT VISIT HI MDM: CPT

## 2023-11-19 PROCEDURE — XW033E5 INTRODUCTION OF REMDESIVIR ANTI-INFECTIVE INTO PERIPHERAL VEIN, PERCUTANEOUS APPROACH, NEW TECHNOLOGY GROUP 5: ICD-10-PCS | Performed by: INTERNAL MEDICINE

## 2023-11-19 PROCEDURE — 87040 BLOOD CULTURE FOR BACTERIA: CPT | Performed by: EMERGENCY MEDICINE

## 2023-11-19 PROCEDURE — 36415 COLL VENOUS BLD VENIPUNCTURE: CPT | Performed by: INTERNAL MEDICINE

## 2023-11-19 PROCEDURE — 71045 X-RAY EXAM CHEST 1 VIEW: CPT

## 2023-11-19 PROCEDURE — 10002800 HB RX 250 W HCPCS: Performed by: EMERGENCY MEDICINE

## 2023-11-19 PROCEDURE — 80053 COMPREHEN METABOLIC PANEL: CPT | Performed by: EMERGENCY MEDICINE

## 2023-11-19 RX ORDER — AMIODARONE HYDROCHLORIDE 200 MG/1
200 TABLET ORAL DAILY
Status: DISCONTINUED | OUTPATIENT
Start: 2023-11-20 | End: 2023-11-21 | Stop reason: HOSPADM

## 2023-11-19 RX ORDER — ACETAMINOPHEN 650 MG/1
650 SUPPOSITORY RECTAL ONCE
Status: COMPLETED | OUTPATIENT
Start: 2023-11-19 | End: 2023-11-19

## 2023-11-19 RX ORDER — PANTOPRAZOLE SODIUM 40 MG/1
40 TABLET, DELAYED RELEASE ORAL DAILY
Status: DISCONTINUED | OUTPATIENT
Start: 2023-11-20 | End: 2023-11-21 | Stop reason: HOSPADM

## 2023-11-19 RX ORDER — FUROSEMIDE 20 MG/1
20 TABLET ORAL EVERY MORNING
Status: DISCONTINUED | OUTPATIENT
Start: 2023-11-20 | End: 2023-11-21 | Stop reason: HOSPADM

## 2023-11-19 RX ORDER — 0.9 % SODIUM CHLORIDE 0.9 %
2 VIAL (ML) INJECTION EVERY 12 HOURS SCHEDULED
Status: DISCONTINUED | OUTPATIENT
Start: 2023-11-19 | End: 2023-11-21 | Stop reason: HOSPADM

## 2023-11-19 RX ORDER — ASPIRIN 81 MG/1
81 TABLET ORAL DAILY
Status: DISCONTINUED | OUTPATIENT
Start: 2023-11-20 | End: 2023-11-21 | Stop reason: HOSPADM

## 2023-11-19 RX ORDER — TAMSULOSIN HYDROCHLORIDE 0.4 MG/1
0.4 CAPSULE ORAL EVERY EVENING
Status: DISCONTINUED | OUTPATIENT
Start: 2023-11-19 | End: 2023-11-21 | Stop reason: HOSPADM

## 2023-11-19 RX ORDER — ATORVASTATIN CALCIUM 20 MG/1
20 TABLET, FILM COATED ORAL DAILY
Status: DISCONTINUED | OUTPATIENT
Start: 2023-11-20 | End: 2023-11-21 | Stop reason: HOSPADM

## 2023-11-19 RX ORDER — ACETAMINOPHEN 500 MG
1000 TABLET ORAL ONCE
Status: DISCONTINUED | OUTPATIENT
Start: 2023-11-19 | End: 2023-11-19

## 2023-11-19 RX ORDER — ACETAMINOPHEN 325 MG/1
650 TABLET ORAL EVERY 4 HOURS PRN
Status: DISCONTINUED | OUTPATIENT
Start: 2023-11-19 | End: 2023-11-21 | Stop reason: HOSPADM

## 2023-11-19 RX ORDER — CALCIUM POLYCARBOPHIL 625 MG 625 MG/1
625 TABLET ORAL DAILY
COMMUNITY

## 2023-11-19 RX ORDER — METOPROLOL SUCCINATE 25 MG/1
25 TABLET, EXTENDED RELEASE ORAL 2 TIMES DAILY
Status: DISCONTINUED | OUTPATIENT
Start: 2023-11-19 | End: 2023-11-21 | Stop reason: HOSPADM

## 2023-11-19 RX ORDER — DOCUSATE SODIUM 100 MG/1
100 CAPSULE, LIQUID FILLED ORAL DAILY
COMMUNITY

## 2023-11-19 RX ORDER — MELATONIN 10 MG
250 TABLET, SUBLINGUAL SUBLINGUAL
COMMUNITY

## 2023-11-19 RX ORDER — CEFAZOLIN SODIUM/WATER 2 G/20 ML
2000 SYRINGE (ML) INTRAVENOUS ONCE
Status: COMPLETED | OUTPATIENT
Start: 2023-11-19 | End: 2023-11-19

## 2023-11-19 RX ORDER — ENALAPRIL MALEATE 10 MG/1
10 TABLET ORAL 2 TIMES DAILY
Status: DISCONTINUED | OUTPATIENT
Start: 2023-11-19 | End: 2023-11-21 | Stop reason: HOSPADM

## 2023-11-19 RX ORDER — DOCUSATE SODIUM 100 MG/1
100 CAPSULE, LIQUID FILLED ORAL DAILY
Status: DISCONTINUED | OUTPATIENT
Start: 2023-11-20 | End: 2023-11-21 | Stop reason: HOSPADM

## 2023-11-19 RX ADMIN — SODIUM CHLORIDE, PRESERVATIVE FREE 2 ML: 5 INJECTION INTRAVENOUS at 22:06

## 2023-11-19 RX ADMIN — ACETAMINOPHEN 325 MG: 325 SUPPOSITORY RECTAL at 12:57

## 2023-11-19 RX ADMIN — TAMSULOSIN HYDROCHLORIDE 0.4 MG: 0.4 CAPSULE ORAL at 18:03

## 2023-11-19 RX ADMIN — ACETAMINOPHEN 650 MG: 325 TABLET ORAL at 22:12

## 2023-11-19 RX ADMIN — SODIUM CHLORIDE 1000 ML: 9 INJECTION, SOLUTION INTRAVENOUS at 14:48

## 2023-11-19 RX ADMIN — SODIUM CHLORIDE, PRESERVATIVE FREE 2 ML: 5 INJECTION INTRAVENOUS at 18:12

## 2023-11-19 RX ADMIN — CEFTRIAXONE SODIUM 2000 MG: 10 INJECTION, POWDER, FOR SOLUTION INTRAVENOUS at 12:59

## 2023-11-19 RX ADMIN — REMDESIVIR 200 MG: 100 INJECTION, POWDER, LYOPHILIZED, FOR SOLUTION INTRAVENOUS at 18:12

## 2023-11-19 RX ADMIN — SODIUM CHLORIDE 500 ML: 9 INJECTION, SOLUTION INTRAVENOUS at 13:00

## 2023-11-19 RX ADMIN — ENALAPRIL MALEATE 10 MG: 10 TABLET ORAL at 22:04

## 2023-11-19 RX ADMIN — APIXABAN 5 MG: 5 TABLET, FILM COATED ORAL at 22:03

## 2023-11-19 RX ADMIN — METOPROLOL SUCCINATE 25 MG: 25 TABLET, EXTENDED RELEASE ORAL at 22:03

## 2023-11-19 RX ADMIN — ACETAMINOPHEN 650 MG: 650 SUPPOSITORY RECTAL at 12:56

## 2023-11-19 SDOH — HEALTH STABILITY: PHYSICAL HEALTH: DO YOU HAVE SERIOUS DIFFICULTY WALKING OR CLIMBING STAIRS?: NO

## 2023-11-19 SDOH — HEALTH STABILITY: GENERAL: BECAUSE OF A PHYSICAL, MENTAL, OR EMOTIONAL CONDITION, DO YOU HAVE DIFFICULTY DOING ERRANDS ALONE?: NO

## 2023-11-19 SDOH — ECONOMIC STABILITY: HOUSING INSECURITY: WHAT IS YOUR LIVING SITUATION TODAY?: HOUSE

## 2023-11-19 SDOH — ECONOMIC STABILITY: GENERAL

## 2023-11-19 SDOH — ECONOMIC STABILITY: TRANSPORTATION INSECURITY
IN THE PAST 12 MONTHS, HAS LACK OF TRANSPORTATION KEPT YOU FROM MEETINGS, WORK, OR FROM GETTING THINGS NEEDED FOR DAILY LIVING?: NO

## 2023-11-19 SDOH — ECONOMIC STABILITY: TRANSPORTATION INSECURITY
IN THE PAST 12 MONTHS, HAS THE LACK OF TRANSPORTATION KEPT YOU FROM MEDICAL APPOINTMENTS OR FROM GETTING MEDICATIONS?: NO

## 2023-11-19 SDOH — ECONOMIC STABILITY: FOOD INSECURITY: HOW OFTEN IN THE PAST 12 MONTHS WERE YOU WORRIED OR STRESSED ABOUT HAVING ENOUGH MONEY TO BUY NUTRITIOUS MEALS?: NEVER

## 2023-11-19 SDOH — ECONOMIC STABILITY: HOUSING INSECURITY: WHAT IS YOUR LIVING SITUATION TODAY?: FAMILY MEMBERS

## 2023-11-19 SDOH — SOCIAL STABILITY: SOCIAL NETWORK
HOW OFTEN DO YOU SEE OR TALK TO PEOPLE THAT YOU CARE ABOUT AND FEEL CLOSE TO? (FOR EXAMPLE: TALKING TO FRIENDS ON THE PHONE, VISITING FRIENDS OR FAMILY, GOING TO CHURCH OR CLUB MEETINGS): 5 OR MORE TIMES A WEEK

## 2023-11-19 SDOH — HEALTH STABILITY: PHYSICAL HEALTH: DO YOU HAVE DIFFICULTY DRESSING OR BATHING?: NO

## 2023-11-19 SDOH — HEALTH STABILITY: GENERAL
BECAUSE OF A PHYSICAL, MENTAL, OR EMOTIONAL CONDITION, DO YOU HAVE SERIOUS DIFFICULTY CONCENTRATING, REMEMBERING OR MAKING DECISIONS?: NO

## 2023-11-19 SDOH — SOCIAL STABILITY: SOCIAL NETWORK: SUPPORT SYSTEMS: CHURCH/FAITH COMMUNITY;FAMILY MEMBERS

## 2023-11-19 SDOH — ECONOMIC STABILITY: HOUSING INSECURITY: ARE YOU WORRIED ABOUT LOSING YOUR HOUSING?: NO

## 2023-11-19 ASSESSMENT — COLUMBIA-SUICIDE SEVERITY RATING SCALE - C-SSRS
IS THE PATIENT ABLE TO COMPLETE C-SSRS: YES
6. HAVE YOU EVER DONE ANYTHING, STARTED TO DO ANYTHING, OR PREPARED TO DO ANYTHING TO END YOUR LIFE?: NO
2. HAVE YOU ACTUALLY HAD ANY THOUGHTS OF KILLING YOURSELF?: NO
1. WITHIN THE PAST MONTH, HAVE YOU WISHED YOU WERE DEAD OR WISHED YOU COULD GO TO SLEEP AND NOT WAKE UP?: NO

## 2023-11-19 ASSESSMENT — ENCOUNTER SYMPTOMS
EYE PAIN: 0
NAUSEA: 0
ACTIVITY CHANGE: 1
ABDOMINAL PAIN: 0
CHILLS: 1
LIGHT-HEADEDNESS: 0
RHINORRHEA: 0
AGITATION: 0
DIZZINESS: 0
SHORTNESS OF BREATH: 0
CONFUSION: 1
FEVER: 1
BACK PAIN: 0
CHEST TIGHTNESS: 0
COUGH: 1
VOMITING: 0
FATIGUE: 0
WEAKNESS: 1

## 2023-11-19 ASSESSMENT — LIFESTYLE VARIABLES
AUDIT-C TOTAL SCORE: 1
HOW MANY STANDARD DRINKS CONTAINING ALCOHOL DO YOU HAVE ON A TYPICAL DAY: 0,1 OR 2
AUDIT-C TOTAL SCORE: 0
HOW OFTEN DO YOU HAVE A DRINK CONTAINING ALCOHOL: MONTHLY OR LESS
HOW OFTEN DO YOU HAVE 6 OR MORE DRINKS ON ONE OCCASION: NEVER
ALCOHOL_USE_STATUS: NO OR LOW RISK WITH VALIDATED TOOL
ALCOHOL_USE_STATUS: NO OR LOW RISK WITH VALIDATED TOOL
HOW MANY STANDARD DRINKS CONTAINING ALCOHOL DO YOU HAVE ON A TYPICAL DAY: 0,1 OR 2
HOW OFTEN DO YOU HAVE 6 OR MORE DRINKS ON ONE OCCASION: NEVER
HOW OFTEN DO YOU HAVE A DRINK CONTAINING ALCOHOL: NEVER

## 2023-11-19 ASSESSMENT — ORIENTATION MEMORY CONCENTRATION TEST (OMCT)
COUNT BACKWARDS FROM 20 TO 1: CORRECT
WHAT TIME IS IT (NO WATCH OR CLOCK): CORRECT
WHAT MONTH IS IT NOW: CORRECT
WHAT YEAR IS IT NOW (MUST BE EXACT): CORRECT
SAY THE MONTHS IN REVERSE ORDER STARTING WITH LAST MONTH: CORRECT
OMCT INTERPRETATION: 0-6: NO SIGNIFICANT IMPAIRMENT
OMCT SCORE: 0
REPEAT THE NAME AND ADDRESS I ASKED YOU TO REMEMBER: CORRECT

## 2023-11-19 ASSESSMENT — PATIENT HEALTH QUESTIONNAIRE - PHQ9
1. LITTLE INTEREST OR PLEASURE IN DOING THINGS: NOT AT ALL
CLINICAL INTERPRETATION OF PHQ2 SCORE: NO FURTHER SCREENING NEEDED
IS PATIENT ABLE TO COMPLETE PHQ2 OR PHQ9: YES
SUM OF ALL RESPONSES TO PHQ9 QUESTIONS 1 AND 2: 0
SUM OF ALL RESPONSES TO PHQ9 QUESTIONS 1 AND 2: 0
2. FEELING DOWN, DEPRESSED OR HOPELESS: NOT AT ALL

## 2023-11-19 ASSESSMENT — PAIN SCALES - GENERAL: PAINLEVEL_OUTOF10: 0

## 2023-11-19 ASSESSMENT — ACTIVITIES OF DAILY LIVING (ADL)
RECENT_DECLINE_ADL: NO
ADL_SHORT_OF_BREATH: NO
ADL_SHORT_OF_BREATH: NO
ADL_SCORE: 12
ADL_BEFORE_ADMISSION: INDEPENDENT
RECENT_DECLINE_ADL: NO

## 2023-11-20 LAB
ALBUMIN SERPL-MCNC: 2.8 G/DL (ref 3.6–5.1)
ALBUMIN/GLOB SERPL: 0.9 {RATIO} (ref 1–2.4)
ALP SERPL-CCNC: 58 UNITS/L (ref 45–117)
ALT SERPL-CCNC: 40 UNITS/L
ANION GAP SERPL CALC-SCNC: 10 MMOL/L (ref 7–19)
AST SERPL-CCNC: 31 UNITS/L
BILIRUB SERPL-MCNC: 0.4 MG/DL (ref 0.2–1)
BUN SERPL-MCNC: 18 MG/DL (ref 6–20)
BUN/CREAT SERPL: 21 (ref 7–25)
CALCIUM SERPL-MCNC: 8.6 MG/DL (ref 8.4–10.2)
CHLORIDE SERPL-SCNC: 107 MMOL/L (ref 97–110)
CO2 SERPL-SCNC: 24 MMOL/L (ref 21–32)
CREAT SERPL-MCNC: 0.86 MG/DL (ref 0.67–1.17)
DEPRECATED RDW RBC: 50.1 FL (ref 39–50)
EGFRCR SERPLBLD CKD-EPI 2021: 85 ML/MIN/{1.73_M2}
ERYTHROCYTE [DISTWIDTH] IN BLOOD: 15.1 % (ref 11–15)
FASTING DURATION TIME PATIENT: ABNORMAL H
GLOBULIN SER-MCNC: 3 G/DL (ref 2–4)
GLUCOSE SERPL-MCNC: 96 MG/DL (ref 70–99)
HCT VFR BLD CALC: 29 % (ref 39–51)
HGB BLD-MCNC: 9.3 G/DL (ref 13–17)
LYMPHOCYTES # BLD: 1.3 K/MCL (ref 1–4)
LYMPHOCYTES NFR BLD: 18 %
MCH RBC QN AUTO: 29.1 PG (ref 26–34)
MCHC RBC AUTO-ENTMCNC: 32.1 G/DL (ref 32–36.5)
MCV RBC AUTO: 90.6 FL (ref 78–100)
MONOCYTES # BLD: 2.4 K/MCL (ref 0.3–0.9)
MONOCYTES NFR BLD: 34 %
NEUTROPHILS # BLD: 3.5 K/MCL (ref 1.8–7.7)
NEUTS SEG NFR BLD: 48 %
NRBC BLD MANUAL-RTO: 0 /100 WBC
PLAT MORPH BLD: NORMAL
PLATELET # BLD AUTO: 189 K/MCL (ref 140–450)
POTASSIUM SERPL-SCNC: 3.9 MMOL/L (ref 3.4–5.1)
PROT SERPL-MCNC: 5.8 G/DL (ref 6.4–8.2)
RAINBOW EXTRA TUBES HOLD SPECIMEN: NORMAL
RBC # BLD: 3.2 MIL/MCL (ref 4.5–5.9)
RBC MORPH BLD: NORMAL
SODIUM SERPL-SCNC: 137 MMOL/L (ref 135–145)
WBC # BLD: 7.2 K/MCL (ref 4.2–11)
WBC MORPH BLD: NORMAL

## 2023-11-20 PROCEDURE — 10002800 HB RX 250 W HCPCS: Performed by: INTERNAL MEDICINE

## 2023-11-20 PROCEDURE — 10002803 HB RX 637: Performed by: INTERNAL MEDICINE

## 2023-11-20 PROCEDURE — 36415 COLL VENOUS BLD VENIPUNCTURE: CPT | Performed by: INTERNAL MEDICINE

## 2023-11-20 PROCEDURE — 80053 COMPREHEN METABOLIC PANEL: CPT | Performed by: INTERNAL MEDICINE

## 2023-11-20 PROCEDURE — 10004651 HB RX, NO CHARGE ITEM: Performed by: EMERGENCY MEDICINE

## 2023-11-20 PROCEDURE — 10002807 HB RX 258: Performed by: INTERNAL MEDICINE

## 2023-11-20 PROCEDURE — 99223 1ST HOSP IP/OBS HIGH 75: CPT | Performed by: INTERNAL MEDICINE

## 2023-11-20 PROCEDURE — 85027 COMPLETE CBC AUTOMATED: CPT | Performed by: INTERNAL MEDICINE

## 2023-11-20 PROCEDURE — 10004651 HB RX, NO CHARGE ITEM: Performed by: INTERNAL MEDICINE

## 2023-11-20 PROCEDURE — 10006031 HB ROOM CHARGE TELEMETRY

## 2023-11-20 RX ORDER — FLUTICASONE PROPIONATE 50 MCG
2 SPRAY, SUSPENSION (ML) NASAL DAILY
Status: DISCONTINUED | OUTPATIENT
Start: 2023-11-20 | End: 2023-11-21 | Stop reason: HOSPADM

## 2023-11-20 RX ADMIN — ENALAPRIL MALEATE 10 MG: 10 TABLET ORAL at 21:37

## 2023-11-20 RX ADMIN — PANTOPRAZOLE SODIUM 40 MG: 40 TABLET, DELAYED RELEASE ORAL at 08:59

## 2023-11-20 RX ADMIN — ENALAPRIL MALEATE 10 MG: 10 TABLET ORAL at 08:59

## 2023-11-20 RX ADMIN — ACETAMINOPHEN 650 MG: 325 TABLET ORAL at 21:41

## 2023-11-20 RX ADMIN — APIXABAN 5 MG: 5 TABLET, FILM COATED ORAL at 08:59

## 2023-11-20 RX ADMIN — ATORVASTATIN CALCIUM 20 MG: 20 TABLET, FILM COATED ORAL at 08:59

## 2023-11-20 RX ADMIN — SODIUM CHLORIDE, PRESERVATIVE FREE 2 ML: 5 INJECTION INTRAVENOUS at 21:36

## 2023-11-20 RX ADMIN — REMDESIVIR 100 MG: 100 INJECTION, POWDER, LYOPHILIZED, FOR SOLUTION INTRAVENOUS at 12:11

## 2023-11-20 RX ADMIN — ASPIRIN 81 MG: 81 TABLET, COATED ORAL at 08:59

## 2023-11-20 RX ADMIN — METOPROLOL SUCCINATE 25 MG: 25 TABLET, EXTENDED RELEASE ORAL at 21:37

## 2023-11-20 RX ADMIN — METOPROLOL SUCCINATE 25 MG: 25 TABLET, EXTENDED RELEASE ORAL at 08:59

## 2023-11-20 RX ADMIN — DOCUSATE SODIUM 100 MG: 100 CAPSULE, LIQUID FILLED ORAL at 08:59

## 2023-11-20 RX ADMIN — FUROSEMIDE 20 MG: 20 TABLET ORAL at 08:59

## 2023-11-20 RX ADMIN — APIXABAN 5 MG: 5 TABLET, FILM COATED ORAL at 21:37

## 2023-11-20 RX ADMIN — SODIUM CHLORIDE, PRESERVATIVE FREE 2 ML: 5 INJECTION INTRAVENOUS at 09:02

## 2023-11-20 RX ADMIN — TAMSULOSIN HYDROCHLORIDE 0.4 MG: 0.4 CAPSULE ORAL at 21:39

## 2023-11-20 RX ADMIN — AMIODARONE HYDROCHLORIDE 200 MG: 200 TABLET ORAL at 08:59

## 2023-11-20 ASSESSMENT — PAIN SCALES - GENERAL
PAINLEVEL_OUTOF10: 0
PAINLEVEL_OUTOF10: 4

## 2023-11-21 VITALS
WEIGHT: 169.09 LBS | BODY MASS INDEX: 25.04 KG/M2 | HEART RATE: 73 BPM | OXYGEN SATURATION: 98 % | HEIGHT: 69 IN | RESPIRATION RATE: 16 BRPM | TEMPERATURE: 98.1 F | SYSTOLIC BLOOD PRESSURE: 110 MMHG | DIASTOLIC BLOOD PRESSURE: 67 MMHG

## 2023-11-21 LAB
ALBUMIN SERPL-MCNC: 2.7 G/DL (ref 3.6–5.1)
ALBUMIN/GLOB SERPL: 0.9 {RATIO} (ref 1–2.4)
ALP SERPL-CCNC: 53 UNITS/L (ref 45–117)
ALT SERPL-CCNC: 49 UNITS/L
ANION GAP SERPL CALC-SCNC: 8 MMOL/L (ref 7–19)
AST SERPL-CCNC: 38 UNITS/L
BILIRUB SERPL-MCNC: 0.5 MG/DL (ref 0.2–1)
BUN SERPL-MCNC: 19 MG/DL (ref 6–20)
BUN/CREAT SERPL: 25 (ref 7–25)
CALCIUM SERPL-MCNC: 8.5 MG/DL (ref 8.4–10.2)
CHLORIDE SERPL-SCNC: 108 MMOL/L (ref 97–110)
CO2 SERPL-SCNC: 25 MMOL/L (ref 21–32)
CREAT SERPL-MCNC: 0.77 MG/DL (ref 0.67–1.17)
EGFRCR SERPLBLD CKD-EPI 2021: 88 ML/MIN/{1.73_M2}
FASTING DURATION TIME PATIENT: ABNORMAL H
GLOBULIN SER-MCNC: 2.9 G/DL (ref 2–4)
GLUCOSE SERPL-MCNC: 92 MG/DL (ref 70–99)
POTASSIUM SERPL-SCNC: 3.9 MMOL/L (ref 3.4–5.1)
PROT SERPL-MCNC: 5.6 G/DL (ref 6.4–8.2)
RAINBOW EXTRA TUBES HOLD SPECIMEN: NORMAL
SODIUM SERPL-SCNC: 137 MMOL/L (ref 135–145)

## 2023-11-21 PROCEDURE — 10004180 HB COUNTER-TRANSPORT

## 2023-11-21 PROCEDURE — 10002807 HB RX 258: Performed by: INTERNAL MEDICINE

## 2023-11-21 PROCEDURE — 10002803 HB RX 637: Performed by: INTERNAL MEDICINE

## 2023-11-21 PROCEDURE — 94640 AIRWAY INHALATION TREATMENT: CPT

## 2023-11-21 PROCEDURE — 80053 COMPREHEN METABOLIC PANEL: CPT | Performed by: INTERNAL MEDICINE

## 2023-11-21 PROCEDURE — 10002800 HB RX 250 W HCPCS: Performed by: INTERNAL MEDICINE

## 2023-11-21 PROCEDURE — 10004651 HB RX, NO CHARGE ITEM: Performed by: INTERNAL MEDICINE

## 2023-11-21 PROCEDURE — 10004651 HB RX, NO CHARGE ITEM: Performed by: EMERGENCY MEDICINE

## 2023-11-21 PROCEDURE — 94664 DEMO&/EVAL PT USE INHALER: CPT

## 2023-11-21 PROCEDURE — 36415 COLL VENOUS BLD VENIPUNCTURE: CPT | Performed by: INTERNAL MEDICINE

## 2023-11-21 PROCEDURE — 99239 HOSP IP/OBS DSCHRG MGMT >30: CPT | Performed by: INTERNAL MEDICINE

## 2023-11-21 RX ORDER — ACETAMINOPHEN 325 MG/1
650 TABLET ORAL EVERY 4 HOURS PRN
Qty: 30 TABLET | Refills: 0 | Status: SHIPPED | COMMUNITY
Start: 2023-11-21

## 2023-11-21 RX ORDER — FLUTICASONE PROPIONATE 50 MCG
2 SPRAY, SUSPENSION (ML) NASAL DAILY
Qty: 16 G | Refills: 1 | Status: SHIPPED | OUTPATIENT
Start: 2023-11-22

## 2023-11-21 RX ADMIN — METOPROLOL SUCCINATE 25 MG: 25 TABLET, EXTENDED RELEASE ORAL at 10:05

## 2023-11-21 RX ADMIN — ENALAPRIL MALEATE 10 MG: 10 TABLET ORAL at 10:04

## 2023-11-21 RX ADMIN — ASPIRIN 81 MG: 81 TABLET, COATED ORAL at 10:05

## 2023-11-21 RX ADMIN — REMDESIVIR 100 MG: 100 INJECTION, POWDER, LYOPHILIZED, FOR SOLUTION INTRAVENOUS at 12:53

## 2023-11-21 RX ADMIN — FUROSEMIDE 20 MG: 20 TABLET ORAL at 10:05

## 2023-11-21 RX ADMIN — APIXABAN 5 MG: 5 TABLET, FILM COATED ORAL at 10:05

## 2023-11-21 RX ADMIN — FLUTICASONE PROPIONATE 2 SPRAY: 50 SPRAY, METERED NASAL at 09:32

## 2023-11-21 RX ADMIN — SODIUM CHLORIDE, PRESERVATIVE FREE 2 ML: 5 INJECTION INTRAVENOUS at 10:09

## 2023-11-21 RX ADMIN — AMIODARONE HYDROCHLORIDE 200 MG: 200 TABLET ORAL at 10:05

## 2023-11-21 RX ADMIN — ATORVASTATIN CALCIUM 20 MG: 20 TABLET, FILM COATED ORAL at 10:05

## 2023-11-21 RX ADMIN — DOCUSATE SODIUM 100 MG: 100 CAPSULE, LIQUID FILLED ORAL at 10:05

## 2023-11-21 RX ADMIN — PANTOPRAZOLE SODIUM 40 MG: 40 TABLET, DELAYED RELEASE ORAL at 10:05

## 2023-11-21 ASSESSMENT — PAIN SCALES - GENERAL: PAINLEVEL_OUTOF10: 0

## 2023-11-24 LAB
BACTERIA BLD CULT: NORMAL
BACTERIA BLD CULT: NORMAL

## 2023-12-19 ENCOUNTER — WALK IN (OUTPATIENT)
Dept: URGENT CARE | Age: 83
End: 2023-12-19
Attending: EMERGENCY MEDICINE

## 2023-12-19 ENCOUNTER — HOSPITAL ENCOUNTER (OUTPATIENT)
Dept: GENERAL RADIOLOGY | Age: 83
Discharge: HOME | End: 2023-12-19
Attending: EMERGENCY MEDICINE

## 2023-12-19 VITALS
BODY MASS INDEX: 24.81 KG/M2 | TEMPERATURE: 98.1 F | RESPIRATION RATE: 16 BRPM | SYSTOLIC BLOOD PRESSURE: 146 MMHG | OXYGEN SATURATION: 97 % | WEIGHT: 168 LBS | DIASTOLIC BLOOD PRESSURE: 79 MMHG | HEART RATE: 72 BPM

## 2023-12-19 DIAGNOSIS — M79.89 SWELLING OF LEFT HAND: ICD-10-CM

## 2023-12-19 DIAGNOSIS — T14.8XXA WOUND INFECTION: Primary | ICD-10-CM

## 2023-12-19 DIAGNOSIS — L08.9 WOUND INFECTION: Primary | ICD-10-CM

## 2023-12-19 PROCEDURE — 73130 X-RAY EXAM OF HAND: CPT

## 2023-12-19 RX ORDER — CEPHALEXIN 500 MG/1
500 CAPSULE ORAL 4 TIMES DAILY
Qty: 28 CAPSULE | Refills: 0 | Status: SHIPPED | OUTPATIENT
Start: 2023-12-19 | End: 2023-12-26

## 2023-12-19 ASSESSMENT — PAIN SCALES - GENERAL
PAINLEVEL_OUTOF10: 1
PAINLEVEL: 1

## 2023-12-21 ENCOUNTER — OFFICE VISIT (OUTPATIENT)
Dept: SPORTS MEDICINE | Age: 83
End: 2023-12-21
Attending: EMERGENCY MEDICINE

## 2023-12-21 VITALS — WEIGHT: 168 LBS | BODY MASS INDEX: 24.88 KG/M2 | HEIGHT: 69 IN

## 2023-12-21 DIAGNOSIS — T14.8XXA POSTTRAUMATIC WOUND INFECTION: Primary | ICD-10-CM

## 2023-12-21 DIAGNOSIS — L08.9 POSTTRAUMATIC WOUND INFECTION: Primary | ICD-10-CM

## 2023-12-21 DIAGNOSIS — R22.32 LOCALIZED SWELLING ON LEFT HAND: ICD-10-CM

## 2023-12-21 PROCEDURE — 99203 OFFICE O/P NEW LOW 30 MIN: CPT | Performed by: FAMILY MEDICINE

## 2023-12-21 ASSESSMENT — ENCOUNTER SYMPTOMS
HEADACHES: 0
ACTIVITY CHANGE: 0
SORE THROAT: 0
DIARRHEA: 0
CHOKING: 0
CHILLS: 0
COLOR CHANGE: 0
BACK PAIN: 0
EYE ITCHING: 0
FACIAL SWELLING: 0
TROUBLE SWALLOWING: 0
EYE PAIN: 0
NUMBNESS: 0
COUGH: 0
SINUS PRESSURE: 1
CONSTIPATION: 0
ABDOMINAL PAIN: 0
PHOTOPHOBIA: 0
WOUND: 1
FEVER: 0
SINUS PAIN: 1
EYE REDNESS: 0
DIZZINESS: 0
CHEST TIGHTNESS: 0
EYE DISCHARGE: 0
NAUSEA: 0
AGITATION: 0
SEIZURES: 0
CONFUSION: 0
APPETITE CHANGE: 0

## 2023-12-22 ENCOUNTER — OFFICE VISIT (OUTPATIENT)
Dept: ORTHOPEDICS | Age: 83
End: 2023-12-22
Attending: FAMILY MEDICINE

## 2023-12-22 ENCOUNTER — OFFICE VISIT (OUTPATIENT)
Dept: SPORTS MEDICINE | Age: 83
End: 2023-12-22

## 2023-12-22 ENCOUNTER — E-ADVICE (OUTPATIENT)
Dept: ORTHOPEDICS | Age: 83
End: 2023-12-22

## 2023-12-22 VITALS — HEIGHT: 69 IN | WEIGHT: 168 LBS | BODY MASS INDEX: 24.88 KG/M2

## 2023-12-22 DIAGNOSIS — S60.552A FOREIGN BODY OF LEFT HAND, INITIAL ENCOUNTER: Primary | ICD-10-CM

## 2023-12-22 DIAGNOSIS — S61.422A LACERATION OF LEFT HAND WITH FOREIGN BODY, INITIAL ENCOUNTER: Primary | ICD-10-CM

## 2023-12-22 PROCEDURE — 76881 US COMPL JOINT R-T W/IMG: CPT | Performed by: FAMILY MEDICINE

## 2023-12-22 PROCEDURE — 99204 OFFICE O/P NEW MOD 45 MIN: CPT | Performed by: ORTHOPAEDIC SURGERY

## 2023-12-22 RX ORDER — CEPHALEXIN 500 MG/1
500 CAPSULE ORAL 4 TIMES DAILY
Qty: 12 CAPSULE | Refills: 0 | Status: SHIPPED | OUTPATIENT
Start: 2023-12-22 | End: 2023-12-25

## 2024-01-10 ENCOUNTER — APPOINTMENT (OUTPATIENT)
Dept: ORTHOPEDICS | Age: 84
End: 2024-01-10

## 2024-01-10 DIAGNOSIS — S60.552A FOREIGN BODY OF LEFT HAND, INITIAL ENCOUNTER: ICD-10-CM

## 2024-01-10 DIAGNOSIS — Z09 S/P ORTHOPEDIC SURGERY, FOLLOW-UP EXAM: Primary | ICD-10-CM

## 2024-01-10 PROCEDURE — 99024 POSTOP FOLLOW-UP VISIT: CPT | Performed by: PHYSICIAN ASSISTANT

## 2024-02-15 ENCOUNTER — APPOINTMENT (OUTPATIENT)
Dept: CARDIOLOGY | Age: 84
End: 2024-02-15

## 2024-02-21 ENCOUNTER — APPOINTMENT (OUTPATIENT)
Dept: CARDIOLOGY | Age: 84
End: 2024-02-21
Attending: INTERNAL MEDICINE

## 2024-02-21 VITALS — HEART RATE: 89 BPM

## 2024-02-21 DIAGNOSIS — Z95.0 CARDIAC PACEMAKER IN SITU: ICD-10-CM

## 2024-02-21 LAB
MDC_IDC_EPISODE_DTM: NORMAL
MDC_IDC_EPISODE_DURATION: 1976 S
MDC_IDC_EPISODE_DURATION: 201 S
MDC_IDC_EPISODE_DURATION: 3255 S
MDC_IDC_EPISODE_DURATION: 9246 S
MDC_IDC_EPISODE_DURATION: NORMAL S
MDC_IDC_EPISODE_ID: 184
MDC_IDC_EPISODE_ID: 185
MDC_IDC_EPISODE_ID: 186
MDC_IDC_EPISODE_ID: 187
MDC_IDC_EPISODE_ID: 188
MDC_IDC_EPISODE_ID: 189
MDC_IDC_EPISODE_ID: 190
MDC_IDC_EPISODE_ID: 191
MDC_IDC_EPISODE_ID: 192
MDC_IDC_EPISODE_ID: 193
MDC_IDC_EPISODE_TYPE: NORMAL
MDC_IDC_LEAD_IMPLANT_DT: NORMAL
MDC_IDC_LEAD_IMPLANT_DT: NORMAL
MDC_IDC_LEAD_LOCATION: NORMAL
MDC_IDC_LEAD_LOCATION: NORMAL
MDC_IDC_LEAD_LOCATION_DETAIL_1: NORMAL
MDC_IDC_LEAD_LOCATION_DETAIL_1: NORMAL
MDC_IDC_LEAD_MFG: NORMAL
MDC_IDC_LEAD_MFG: NORMAL
MDC_IDC_LEAD_MODEL: NORMAL
MDC_IDC_LEAD_MODEL: NORMAL
MDC_IDC_LEAD_POLARITY_TYPE: NORMAL
MDC_IDC_LEAD_POLARITY_TYPE: NORMAL
MDC_IDC_LEAD_SERIAL: NORMAL
MDC_IDC_LEAD_SERIAL: NORMAL
MDC_IDC_MSMT_BATTERY_DTM: NORMAL
MDC_IDC_MSMT_BATTERY_REMAINING_LONGEVITY: 79 MO
MDC_IDC_MSMT_BATTERY_RRT_TRIGGER: 2.62
MDC_IDC_MSMT_BATTERY_STATUS: NORMAL
MDC_IDC_MSMT_BATTERY_VOLTAGE: 2.97 V
MDC_IDC_MSMT_LEADCHNL_RA_IMPEDANCE_VALUE: 342 OHM
MDC_IDC_MSMT_LEADCHNL_RA_IMPEDANCE_VALUE: 399 OHM
MDC_IDC_MSMT_LEADCHNL_RA_PACING_THRESHOLD_AMPLITUDE: 0.62 V
MDC_IDC_MSMT_LEADCHNL_RA_PACING_THRESHOLD_AMPLITUDE: 0.75 V
MDC_IDC_MSMT_LEADCHNL_RA_PACING_THRESHOLD_PULSEWIDTH: 0.4 MS
MDC_IDC_MSMT_LEADCHNL_RA_PACING_THRESHOLD_PULSEWIDTH: 0.4 MS
MDC_IDC_MSMT_LEADCHNL_RA_SENSING_INTR_AMPL: 2.25 MV
MDC_IDC_MSMT_LEADCHNL_RA_SENSING_INTR_AMPL: 2.5 MV
MDC_IDC_MSMT_LEADCHNL_RV_IMPEDANCE_VALUE: 399 OHM
MDC_IDC_MSMT_LEADCHNL_RV_IMPEDANCE_VALUE: 551 OHM
MDC_IDC_MSMT_LEADCHNL_RV_PACING_THRESHOLD_AMPLITUDE: 0.62 V
MDC_IDC_MSMT_LEADCHNL_RV_PACING_THRESHOLD_AMPLITUDE: 0.75 V
MDC_IDC_MSMT_LEADCHNL_RV_PACING_THRESHOLD_PULSEWIDTH: 0.4 MS
MDC_IDC_MSMT_LEADCHNL_RV_PACING_THRESHOLD_PULSEWIDTH: 0.4 MS
MDC_IDC_MSMT_LEADCHNL_RV_SENSING_INTR_AMPL: 8.25 MV
MDC_IDC_MSMT_LEADCHNL_RV_SENSING_INTR_AMPL: 9.5 MV
MDC_IDC_PG_IMPLANT_DTM: NORMAL
MDC_IDC_PG_MFG: NORMAL
MDC_IDC_PG_MODEL: NORMAL
MDC_IDC_PG_SERIAL: NORMAL
MDC_IDC_PG_TYPE: NORMAL
MDC_IDC_SESS_CLINIC_NAME: NORMAL
MDC_IDC_SESS_DTM: NORMAL
MDC_IDC_SESS_TYPE: NORMAL
MDC_IDC_SET_BRADY_AT_MODE_SWITCH_RATE: 171 {BEATS}/MIN
MDC_IDC_SET_BRADY_HYSTRATE: NORMAL
MDC_IDC_SET_BRADY_LOWRATE: 70 {BEATS}/MIN
MDC_IDC_SET_BRADY_MAX_SENSOR_RATE: 110 {BEATS}/MIN
MDC_IDC_SET_BRADY_MAX_TRACKING_RATE: 110 {BEATS}/MIN
MDC_IDC_SET_BRADY_MODE: NORMAL
MDC_IDC_SET_BRADY_PAV_DELAY_LOW: 180 MS
MDC_IDC_SET_BRADY_SAV_DELAY_LOW: 150 MS
MDC_IDC_SET_LEADCHNL_RA_PACING_AMPLITUDE: 1.5 V
MDC_IDC_SET_LEADCHNL_RA_PACING_ANODE_ELECTRODE_1: NORMAL
MDC_IDC_SET_LEADCHNL_RA_PACING_ANODE_LOCATION_1: NORMAL
MDC_IDC_SET_LEADCHNL_RA_PACING_CAPTURE_MODE: NORMAL
MDC_IDC_SET_LEADCHNL_RA_PACING_CATHODE_ELECTRODE_1: NORMAL
MDC_IDC_SET_LEADCHNL_RA_PACING_CATHODE_LOCATION_1: NORMAL
MDC_IDC_SET_LEADCHNL_RA_PACING_POLARITY: NORMAL
MDC_IDC_SET_LEADCHNL_RA_PACING_PULSEWIDTH: 0.4 MS
MDC_IDC_SET_LEADCHNL_RA_SENSING_ANODE_ELECTRODE_1: NORMAL
MDC_IDC_SET_LEADCHNL_RA_SENSING_ANODE_LOCATION_1: NORMAL
MDC_IDC_SET_LEADCHNL_RA_SENSING_CATHODE_ELECTRODE_1: NORMAL
MDC_IDC_SET_LEADCHNL_RA_SENSING_CATHODE_LOCATION_1: NORMAL
MDC_IDC_SET_LEADCHNL_RA_SENSING_POLARITY: NORMAL
MDC_IDC_SET_LEADCHNL_RA_SENSING_SENSITIVITY: 0.3 MV
MDC_IDC_SET_LEADCHNL_RV_PACING_AMPLITUDE: 2 V
MDC_IDC_SET_LEADCHNL_RV_PACING_ANODE_ELECTRODE_1: NORMAL
MDC_IDC_SET_LEADCHNL_RV_PACING_ANODE_LOCATION_1: NORMAL
MDC_IDC_SET_LEADCHNL_RV_PACING_CAPTURE_MODE: NORMAL
MDC_IDC_SET_LEADCHNL_RV_PACING_CATHODE_ELECTRODE_1: NORMAL
MDC_IDC_SET_LEADCHNL_RV_PACING_CATHODE_LOCATION_1: NORMAL
MDC_IDC_SET_LEADCHNL_RV_PACING_POLARITY: NORMAL
MDC_IDC_SET_LEADCHNL_RV_PACING_PULSEWIDTH: 0.4 MS
MDC_IDC_SET_LEADCHNL_RV_SENSING_ANODE_ELECTRODE_1: NORMAL
MDC_IDC_SET_LEADCHNL_RV_SENSING_ANODE_LOCATION_1: NORMAL
MDC_IDC_SET_LEADCHNL_RV_SENSING_CATHODE_ELECTRODE_1: NORMAL
MDC_IDC_SET_LEADCHNL_RV_SENSING_CATHODE_LOCATION_1: NORMAL
MDC_IDC_SET_LEADCHNL_RV_SENSING_POLARITY: NORMAL
MDC_IDC_SET_LEADCHNL_RV_SENSING_SENSITIVITY: 0.9 MV
MDC_IDC_SET_ZONE_DETECTION_INTERVAL: 350 MS
MDC_IDC_SET_ZONE_DETECTION_INTERVAL: 400 MS
MDC_IDC_SET_ZONE_TYPE: NORMAL
MDC_IDC_SET_ZONE_VENDOR_TYPE: NORMAL
MDC_IDC_STAT_AT_BURDEN_PERCENT: 5.2 %
MDC_IDC_STAT_AT_DTM_END: NORMAL
MDC_IDC_STAT_AT_DTM_START: NORMAL
MDC_IDC_STAT_BRADY_AP_VP_PERCENT: 0.05 %
MDC_IDC_STAT_BRADY_AP_VS_PERCENT: 98.47 %
MDC_IDC_STAT_BRADY_AS_VP_PERCENT: 0 %
MDC_IDC_STAT_BRADY_AS_VS_PERCENT: 1.46 %
MDC_IDC_STAT_BRADY_DTM_END: NORMAL
MDC_IDC_STAT_BRADY_DTM_START: NORMAL
MDC_IDC_STAT_BRADY_RA_PERCENT_PACED: 93.58 %
MDC_IDC_STAT_BRADY_RV_PERCENT_PACED: 3.43 %
MDC_IDC_STAT_EPISODE_RECENT_COUNT: 0
MDC_IDC_STAT_EPISODE_RECENT_COUNT: 33
MDC_IDC_STAT_EPISODE_RECENT_COUNT_DTM_END: NORMAL
MDC_IDC_STAT_EPISODE_RECENT_COUNT_DTM_START: NORMAL
MDC_IDC_STAT_EPISODE_TOTAL_COUNT: 0
MDC_IDC_STAT_EPISODE_TOTAL_COUNT: 128
MDC_IDC_STAT_EPISODE_TOTAL_COUNT: 47
MDC_IDC_STAT_EPISODE_TOTAL_COUNT_DTM_END: NORMAL
MDC_IDC_STAT_EPISODE_TOTAL_COUNT_DTM_START: NORMAL
MDC_IDC_STAT_EPISODE_TYPE: NORMAL

## 2024-02-21 PROCEDURE — 93280 PM DEVICE PROGR EVAL DUAL: CPT | Performed by: INTERNAL MEDICINE

## 2024-03-11 ENCOUNTER — APPOINTMENT (OUTPATIENT)
Dept: CARDIOLOGY | Age: 84
End: 2024-03-11

## 2024-03-12 ENCOUNTER — APPOINTMENT (OUTPATIENT)
Dept: CARDIOLOGY | Age: 84
End: 2024-03-12

## 2024-03-12 VITALS
BODY MASS INDEX: 24.16 KG/M2 | HEIGHT: 69 IN | DIASTOLIC BLOOD PRESSURE: 73 MMHG | WEIGHT: 163.14 LBS | HEART RATE: 83 BPM | SYSTOLIC BLOOD PRESSURE: 131 MMHG

## 2024-03-12 DIAGNOSIS — E78.5 DYSLIPIDEMIA: ICD-10-CM

## 2024-03-12 DIAGNOSIS — I10 PRIMARY HYPERTENSION: ICD-10-CM

## 2024-03-12 DIAGNOSIS — I34.0 NONRHEUMATIC MITRAL VALVE REGURGITATION: ICD-10-CM

## 2024-03-12 DIAGNOSIS — Z95.1 S/P CABG (CORONARY ARTERY BYPASS GRAFT): ICD-10-CM

## 2024-03-12 DIAGNOSIS — Z86.73 HISTORY OF CARDIOEMBOLIC CEREBROVASCULAR ACCIDENT (CVA): ICD-10-CM

## 2024-03-12 DIAGNOSIS — Z79.01 LONG TERM CURRENT USE OF ANTICOAGULANT THERAPY: ICD-10-CM

## 2024-03-12 DIAGNOSIS — Z95.0 CARDIAC PACEMAKER IN SITU: ICD-10-CM

## 2024-03-12 DIAGNOSIS — I48.0 PAROXYSMAL ATRIAL FIBRILLATION (CMD): ICD-10-CM

## 2024-03-12 DIAGNOSIS — Z01.810 ENCOUNTER FOR PRE-OPERATIVE CARDIOVASCULAR CLEARANCE: Primary | ICD-10-CM

## 2024-03-12 DIAGNOSIS — I25.10 CORONARY ARTERY DISEASE INVOLVING NATIVE CORONARY ARTERY OF NATIVE HEART WITHOUT ANGINA PECTORIS: ICD-10-CM

## 2024-03-12 SDOH — HEALTH STABILITY: PHYSICAL HEALTH: ON AVERAGE, HOW MANY MINUTES DO YOU ENGAGE IN EXERCISE AT THIS LEVEL?: 40 MIN

## 2024-03-12 SDOH — HEALTH STABILITY: PHYSICAL HEALTH: ON AVERAGE, HOW MANY DAYS PER WEEK DO YOU ENGAGE IN MODERATE TO STRENUOUS EXERCISE (LIKE A BRISK WALK)?: 5 DAYS

## 2024-03-12 ASSESSMENT — PATIENT HEALTH QUESTIONNAIRE - PHQ9
SUM OF ALL RESPONSES TO PHQ9 QUESTIONS 1 AND 2: 0
2. FEELING DOWN, DEPRESSED OR HOPELESS: NOT AT ALL
CLINICAL INTERPRETATION OF PHQ2 SCORE: NO FURTHER SCREENING NEEDED
SUM OF ALL RESPONSES TO PHQ9 QUESTIONS 1 AND 2: 0
1. LITTLE INTEREST OR PLEASURE IN DOING THINGS: NOT AT ALL

## 2025-01-31 ENCOUNTER — APPOINTMENT (OUTPATIENT)
Dept: CT IMAGING | Facility: HOSPITAL | Age: 85
End: 2025-01-31
Attending: EMERGENCY MEDICINE
Payer: MEDICARE

## 2025-01-31 ENCOUNTER — APPOINTMENT (OUTPATIENT)
Dept: GENERAL RADIOLOGY | Facility: HOSPITAL | Age: 85
End: 2025-01-31
Attending: EMERGENCY MEDICINE
Payer: MEDICARE

## 2025-01-31 ENCOUNTER — HOSPITAL ENCOUNTER (INPATIENT)
Facility: HOSPITAL | Age: 85
LOS: 14 days | Discharge: NURSING, REHAB OR LONG-TERM CARE | End: 2025-02-14
Attending: EMERGENCY MEDICINE | Admitting: STUDENT IN AN ORGANIZED HEALTH CARE EDUCATION/TRAINING PROGRAM
Payer: MEDICARE

## 2025-01-31 DIAGNOSIS — E87.20 METABOLIC ACIDOSIS: ICD-10-CM

## 2025-01-31 DIAGNOSIS — N12 PYELONEPHRITIS: ICD-10-CM

## 2025-01-31 DIAGNOSIS — R41.0 DISORIENTATION: Primary | ICD-10-CM

## 2025-01-31 DIAGNOSIS — D69.6 THROMBOCYTOPENIA: ICD-10-CM

## 2025-01-31 DIAGNOSIS — E87.1 HYPONATREMIA: ICD-10-CM

## 2025-01-31 DIAGNOSIS — J18.9 PNEUMONIA OF LEFT UPPER LOBE DUE TO INFECTIOUS ORGANISM: ICD-10-CM

## 2025-01-31 DIAGNOSIS — A41.9 SEPSIS WITH HYPOTENSION (HCC): ICD-10-CM

## 2025-01-31 DIAGNOSIS — A41.9 SEPSIS DUE TO UNDETERMINED ORGANISM (HCC): ICD-10-CM

## 2025-01-31 DIAGNOSIS — I95.9 SEPSIS WITH HYPOTENSION (HCC): ICD-10-CM

## 2025-01-31 LAB
ALBUMIN SERPL-MCNC: 3.5 G/DL (ref 3.2–4.8)
ALBUMIN SERPL-MCNC: 4.6 G/DL (ref 3.2–4.8)
ALBUMIN/GLOB SERPL: 1.8 {RATIO} (ref 1–2)
ALBUMIN/GLOB SERPL: 1.8 {RATIO} (ref 1–2)
ALP LIVER SERPL-CCNC: 43 U/L
ALP LIVER SERPL-CCNC: 59 U/L
ALT SERPL-CCNC: 29 U/L
ALT SERPL-CCNC: 34 U/L
ANION GAP SERPL CALC-SCNC: 11 MMOL/L (ref 0–18)
ANION GAP SERPL CALC-SCNC: 12 MMOL/L (ref 0–18)
ANION GAP SERPL CALC-SCNC: 14 MMOL/L (ref 0–18)
APTT PPP: 32.7 SECONDS (ref 23–36)
AST SERPL-CCNC: 32 U/L (ref ?–34)
AST SERPL-CCNC: 43 U/L (ref ?–34)
ATRIAL RATE: 72 BPM
BASE EXCESS BLDV CALC-SCNC: -4.1 MMOL/L
BASOPHILS # BLD: 0 X10(3) UL (ref 0–0.2)
BASOPHILS # BLD: 0.04 X10(3) UL (ref 0–0.2)
BASOPHILS NFR BLD: 0 %
BASOPHILS NFR BLD: 1 %
BILIRUB SERPL-MCNC: 1.4 MG/DL (ref 0.2–1.1)
BILIRUB SERPL-MCNC: 1.7 MG/DL (ref 0.2–1.1)
BILIRUB UR QL STRIP.AUTO: NEGATIVE
BUN BLD-MCNC: 21 MG/DL (ref 9–23)
BUN BLD-MCNC: 36 MG/DL (ref 9–23)
BUN BLD-MCNC: 40 MG/DL (ref 9–23)
CALCIUM BLD-MCNC: 10 MG/DL (ref 8.7–10.6)
CALCIUM BLD-MCNC: 8.8 MG/DL (ref 8.7–10.6)
CALCIUM BLD-MCNC: 9.2 MG/DL (ref 8.7–10.6)
CHLORIDE SERPL-SCNC: 102 MMOL/L (ref 98–112)
CHLORIDE SERPL-SCNC: 107 MMOL/L (ref 98–112)
CHLORIDE SERPL-SCNC: 108 MMOL/L (ref 98–112)
CO2 SERPL-SCNC: 17 MMOL/L (ref 21–32)
CO2 SERPL-SCNC: 19 MMOL/L (ref 21–32)
CO2 SERPL-SCNC: 19 MMOL/L (ref 21–32)
CREAT BLD-MCNC: 0.92 MG/DL
CREAT BLD-MCNC: 1.17 MG/DL
CREAT BLD-MCNC: 1.5 MG/DL
EGFRCR SERPLBLD CKD-EPI 2021: 45 ML/MIN/1.73M2 (ref 60–?)
EGFRCR SERPLBLD CKD-EPI 2021: 60 ML/MIN/1.73M2 (ref 60–?)
EGFRCR SERPLBLD CKD-EPI 2021: 81 ML/MIN/1.73M2 (ref 60–?)
EOSINOPHIL # BLD: 0 X10(3) UL (ref 0–0.7)
EOSINOPHIL # BLD: 0 X10(3) UL (ref 0–0.7)
EOSINOPHIL NFR BLD: 0 %
EOSINOPHIL NFR BLD: 0 %
ERYTHROCYTE [DISTWIDTH] IN BLOOD BY AUTOMATED COUNT: 14.1 %
ERYTHROCYTE [DISTWIDTH] IN BLOOD BY AUTOMATED COUNT: 14.2 %
ERYTHROCYTE [DISTWIDTH] IN BLOOD BY AUTOMATED COUNT: 14.5 %
GLOBULIN PLAS-MCNC: 2 G/DL (ref 2–3.5)
GLOBULIN PLAS-MCNC: 2.6 G/DL (ref 2–3.5)
GLUCOSE BLD-MCNC: 102 MG/DL (ref 70–99)
GLUCOSE BLD-MCNC: 147 MG/DL (ref 70–99)
GLUCOSE BLD-MCNC: 157 MG/DL (ref 70–99)
GLUCOSE BLD-MCNC: 157 MG/DL (ref 70–99)
GLUCOSE UR STRIP.AUTO-MCNC: NORMAL MG/DL
HCO3 BLDV-SCNC: 21.1 MEQ/L (ref 22–26)
HCT VFR BLD AUTO: 33.3 %
HCT VFR BLD AUTO: 35.7 %
HCT VFR BLD AUTO: 38.5 %
HGB BLD-MCNC: 11.4 G/DL
HGB BLD-MCNC: 11.4 G/DL
HGB BLD-MCNC: 13.4 G/DL
HYALINE CASTS #/AREA URNS AUTO: PRESENT /LPF
INR BLD: 1.87 (ref 0.8–1.2)
INR BLD: 1.91 (ref 0.8–1.2)
LACTATE SERPL-SCNC: 1.8 MMOL/L (ref 0.5–2)
LACTATE SERPL-SCNC: 2.5 MMOL/L (ref 0.5–2)
LEUKOCYTE ESTERASE UR QL STRIP.AUTO: 500
LYMPHOCYTES NFR BLD: 0.8 X10(3) UL (ref 1–4)
LYMPHOCYTES NFR BLD: 1.04 X10(3) UL (ref 1–4)
LYMPHOCYTES NFR BLD: 21 %
LYMPHOCYTES NFR BLD: 4 %
MCH RBC QN AUTO: 31.3 PG (ref 26–34)
MCH RBC QN AUTO: 31.4 PG (ref 26–34)
MCH RBC QN AUTO: 31.6 PG (ref 26–34)
MCHC RBC AUTO-ENTMCNC: 31.9 G/DL (ref 31–37)
MCHC RBC AUTO-ENTMCNC: 34.2 G/DL (ref 31–37)
MCHC RBC AUTO-ENTMCNC: 34.8 G/DL (ref 31–37)
MCV RBC AUTO: 90.8 FL
MCV RBC AUTO: 91.7 FL
MCV RBC AUTO: 98.1 FL
METAMYELOCYTES # BLD: 0.04 X10(3) UL
METAMYELOCYTES NFR BLD: 1 %
MONOCYTES # BLD: 0.08 X10(3) UL (ref 0.1–1)
MONOCYTES # BLD: 1.82 X10(3) UL (ref 0.1–1)
MONOCYTES NFR BLD: 2 %
MONOCYTES NFR BLD: 7 %
MORPHOLOGY: NORMAL
MORPHOLOGY: NORMAL
MRSA DNA SPEC QL NAA+PROBE: NEGATIVE
NEUTROPHILS # BLD AUTO: 2.77 X10 (3) UL (ref 1.5–7.7)
NEUTROPHILS # BLD AUTO: 20.03 X10 (3) UL (ref 1.5–7.7)
NEUTROPHILS NFR BLD: 67 %
NEUTROPHILS NFR BLD: 78 %
NEUTS BAND NFR BLD: 11 %
NEUTS BAND NFR BLD: 8 %
NEUTS HYPERSEG # BLD: 2.85 X10(3) UL (ref 1.5–7.7)
NEUTS HYPERSEG # BLD: 23.14 X10(3) UL (ref 1.5–7.7)
OSMOLALITY SERPL CALC.SUM OF ELEC: 283 MOSM/KG (ref 275–295)
OSMOLALITY SERPL CALC.SUM OF ELEC: 293 MOSM/KG (ref 275–295)
OSMOLALITY SERPL CALC.SUM OF ELEC: 300 MOSM/KG (ref 275–295)
OXYHGB MFR BLDV: 72.9 % (ref 72–78)
P AXIS: 32 DEGREES
P-R INTERVAL: 182 MS
PCO2 BLDV: 33 MM HG (ref 38–50)
PH BLDV: 7.39 [PH] (ref 7.33–7.43)
PH UR STRIP.AUTO: 5.5 [PH] (ref 5–8)
PLATELET # BLD AUTO: 109 10(3)UL (ref 150–450)
PLATELET # BLD AUTO: 115 10(3)UL (ref 150–450)
PLATELET # BLD AUTO: 125 10(3)UL (ref 150–450)
PLATELET MORPHOLOGY: NORMAL
PLATELET MORPHOLOGY: NORMAL
PLATELETS.RETICULATED NFR BLD AUTO: 3.4 % (ref 0–7)
PLATELETS.RETICULATED NFR BLD AUTO: 3.7 % (ref 0–7)
PLATELETS.RETICULATED NFR BLD AUTO: 4.6 % (ref 0–7)
PO2 BLDV: 44 MM HG (ref 30–50)
POTASSIUM SERPL-SCNC: 3.7 MMOL/L (ref 3.5–5.1)
POTASSIUM SERPL-SCNC: 4.4 MMOL/L (ref 3.5–5.1)
POTASSIUM SERPL-SCNC: 4.7 MMOL/L (ref 3.5–5.1)
PROCALCITONIN SERPL-MCNC: 2.47 NG/ML (ref ?–0.05)
PROT SERPL-MCNC: 5.5 G/DL (ref 5.7–8.2)
PROT SERPL-MCNC: 7.2 G/DL (ref 5.7–8.2)
PROT UR STRIP.AUTO-MCNC: 100 MG/DL
PROTHROMBIN TIME: 21.7 SECONDS (ref 11.6–14.8)
PROTHROMBIN TIME: 22.1 SECONDS (ref 11.6–14.8)
Q-T INTERVAL: 400 MS
QRS DURATION: 86 MS
QTC CALCULATION (BEZET): 438 MS
R AXIS: 57 DEGREES
RBC # BLD AUTO: 3.63 X10(6)UL
RBC # BLD AUTO: 3.64 X10(6)UL
RBC # BLD AUTO: 4.24 X10(6)UL
RBC #/AREA URNS AUTO: >10 /HPF
SARS-COV-2 RNA RESP QL NAA+PROBE: NOT DETECTED
SODIUM SERPL-SCNC: 135 MMOL/L (ref 136–145)
SODIUM SERPL-SCNC: 135 MMOL/L (ref 136–145)
SODIUM SERPL-SCNC: 139 MMOL/L (ref 136–145)
SP GR UR STRIP.AUTO: 1.03 (ref 1–1.03)
T AXIS: 41 DEGREES
T3FREE SERPL-MCNC: 3.53 PG/ML (ref 2.4–4.2)
T4 FREE SERPL-MCNC: 1.4 NG/DL (ref 0.8–1.7)
TOTAL CELLS COUNTED BLD: 100
TOTAL CELLS COUNTED BLD: 100
TROPONIN I SERPL HS-MCNC: 13 NG/L
TSI SER-ACNC: 0.28 UIU/ML (ref 0.55–4.78)
UROBILINOGEN UR STRIP.AUTO-MCNC: NORMAL MG/DL
VENTRICULAR RATE: 72 BPM
WBC # BLD AUTO: 19.1 X10(3) UL (ref 4–11)
WBC # BLD AUTO: 26 X10(3) UL (ref 4–11)
WBC # BLD AUTO: 3.8 X10(3) UL (ref 4–11)
WBC #/AREA URNS AUTO: >50 /HPF
WBC CLUMPS UR QL AUTO: PRESENT /HPF

## 2025-01-31 PROCEDURE — 99291 CRITICAL CARE FIRST HOUR: CPT | Performed by: EMERGENCY MEDICINE

## 2025-01-31 PROCEDURE — B548ZZA ULTRASONOGRAPHY OF SUPERIOR VENA CAVA, GUIDANCE: ICD-10-PCS | Performed by: OPTOMETRIST

## 2025-01-31 PROCEDURE — 99223 1ST HOSP IP/OBS HIGH 75: CPT | Performed by: STUDENT IN AN ORGANIZED HEALTH CARE EDUCATION/TRAINING PROGRAM

## 2025-01-31 PROCEDURE — 02HV33Z INSERTION OF INFUSION DEVICE INTO SUPERIOR VENA CAVA, PERCUTANEOUS APPROACH: ICD-10-PCS | Performed by: OPTOMETRIST

## 2025-01-31 PROCEDURE — 71045 X-RAY EXAM CHEST 1 VIEW: CPT | Performed by: EMERGENCY MEDICINE

## 2025-01-31 PROCEDURE — 3E033XZ INTRODUCTION OF VASOPRESSOR INTO PERIPHERAL VEIN, PERCUTANEOUS APPROACH: ICD-10-PCS | Performed by: EMERGENCY MEDICINE

## 2025-01-31 PROCEDURE — 70450 CT HEAD/BRAIN W/O DYE: CPT | Performed by: EMERGENCY MEDICINE

## 2025-01-31 PROCEDURE — 03HY32Z INSERTION OF MONITORING DEVICE INTO UPPER ARTERY, PERCUTANEOUS APPROACH: ICD-10-PCS | Performed by: OPTOMETRIST

## 2025-01-31 PROCEDURE — 4A133B1 MONITORING OF ARTERIAL PRESSURE, PERIPHERAL, PERCUTANEOUS APPROACH: ICD-10-PCS | Performed by: OPTOMETRIST

## 2025-01-31 PROCEDURE — 36620 INSERTION CATHETER ARTERY: CPT | Performed by: EMERGENCY MEDICINE

## 2025-01-31 PROCEDURE — 99292 CRITICAL CARE ADDL 30 MIN: CPT | Performed by: EMERGENCY MEDICINE

## 2025-01-31 PROCEDURE — 70498 CT ANGIOGRAPHY NECK: CPT | Performed by: EMERGENCY MEDICINE

## 2025-01-31 PROCEDURE — 4A133J1 MONITORING OF ARTERIAL PULSE, PERIPHERAL, PERCUTANEOUS APPROACH: ICD-10-PCS | Performed by: OPTOMETRIST

## 2025-01-31 PROCEDURE — 70496 CT ANGIOGRAPHY HEAD: CPT | Performed by: EMERGENCY MEDICINE

## 2025-01-31 PROCEDURE — 76937 US GUIDE VASCULAR ACCESS: CPT | Performed by: EMERGENCY MEDICINE

## 2025-01-31 RX ORDER — AMIODARONE HYDROCHLORIDE 200 MG/1
200 TABLET ORAL DAILY
Status: DISCONTINUED | OUTPATIENT
Start: 2025-02-01 | End: 2025-02-14

## 2025-01-31 RX ORDER — PANTOPRAZOLE SODIUM 40 MG/1
40 TABLET, DELAYED RELEASE ORAL
COMMUNITY

## 2025-01-31 RX ORDER — AMIODARONE HYDROCHLORIDE 200 MG/1
200 TABLET ORAL DAILY
Status: DISCONTINUED | OUTPATIENT
Start: 2025-01-31 | End: 2025-01-31

## 2025-01-31 RX ORDER — ACETAMINOPHEN 500 MG
1000 TABLET ORAL EVERY 8 HOURS PRN
Status: DISCONTINUED | OUTPATIENT
Start: 2025-01-31 | End: 2025-02-14

## 2025-01-31 RX ORDER — ECHINACEA PURPUREA EXTRACT 125 MG
1 TABLET ORAL
Status: DISCONTINUED | OUTPATIENT
Start: 2025-01-31 | End: 2025-02-14

## 2025-01-31 RX ORDER — BISACODYL 10 MG
10 SUPPOSITORY, RECTAL RECTAL
Status: DISCONTINUED | OUTPATIENT
Start: 2025-01-31 | End: 2025-02-14

## 2025-01-31 RX ORDER — FERROUS SULFATE 325(65) MG
325 TABLET, DELAYED RELEASE (ENTERIC COATED) ORAL
COMMUNITY

## 2025-01-31 RX ORDER — PANTOPRAZOLE SODIUM 40 MG/1
40 TABLET, DELAYED RELEASE ORAL
Status: DISCONTINUED | OUTPATIENT
Start: 2025-01-31 | End: 2025-02-14

## 2025-01-31 RX ORDER — METOCLOPRAMIDE HYDROCHLORIDE 5 MG/ML
5 INJECTION INTRAMUSCULAR; INTRAVENOUS EVERY 8 HOURS PRN
Status: DISCONTINUED | OUTPATIENT
Start: 2025-01-31 | End: 2025-02-03

## 2025-01-31 RX ORDER — ONDANSETRON 2 MG/ML
4 INJECTION INTRAMUSCULAR; INTRAVENOUS EVERY 6 HOURS PRN
Status: DISCONTINUED | OUTPATIENT
Start: 2025-01-31 | End: 2025-02-14

## 2025-01-31 RX ORDER — ATORVASTATIN CALCIUM 20 MG/1
20 TABLET, FILM COATED ORAL NIGHTLY
COMMUNITY

## 2025-01-31 RX ORDER — FUROSEMIDE 20 MG/1
20 TABLET ORAL DAILY
COMMUNITY

## 2025-01-31 RX ORDER — TAMSULOSIN HYDROCHLORIDE 0.4 MG/1
0.4 CAPSULE ORAL DAILY
Status: DISCONTINUED | OUTPATIENT
Start: 2025-02-01 | End: 2025-02-14

## 2025-01-31 RX ORDER — POLYETHYLENE GLYCOL 3350 17 G/17G
17 POWDER, FOR SOLUTION ORAL DAILY PRN
Status: DISCONTINUED | OUTPATIENT
Start: 2025-01-31 | End: 2025-02-14

## 2025-01-31 RX ORDER — LEVOTHYROXINE SODIUM 25 UG/1
25 TABLET ORAL
Status: DISCONTINUED | OUTPATIENT
Start: 2025-01-31 | End: 2025-01-31 | Stop reason: ALTCHOICE

## 2025-01-31 RX ORDER — ASPIRIN 81 MG/1
81 TABLET ORAL DAILY
COMMUNITY

## 2025-01-31 RX ORDER — BENZONATATE 200 MG/1
200 CAPSULE ORAL 3 TIMES DAILY PRN
Status: DISCONTINUED | OUTPATIENT
Start: 2025-01-31 | End: 2025-02-14

## 2025-01-31 RX ORDER — FUROSEMIDE 40 MG/5ML
SOLUTION ORAL DAILY
Status: ON HOLD | COMMUNITY
End: 2025-01-31 | Stop reason: CLARIF

## 2025-01-31 RX ORDER — AMIODARONE HYDROCHLORIDE 200 MG/1
200 TABLET ORAL DAILY
COMMUNITY

## 2025-01-31 RX ORDER — SENNOSIDES 8.6 MG
17.2 TABLET ORAL NIGHTLY PRN
Status: DISCONTINUED | OUTPATIENT
Start: 2025-01-31 | End: 2025-02-14

## 2025-01-31 NOTE — PAYOR COMM NOTE
--------------  ADMISSION REVIEW     Payor: [employer]  Subscriber #:  [id number]  Authorization Number: [id number]    Admit date: 1/31/25  Admit time:  4:45 AM       REVIEW DOCUMENTATION:    Patient Seen in: [facility] Emergency Department      History     Chief Complaint   Patient presents with    Stroke     Stated Complaint:     Subjective:   HPI      Patient is an 85-year-old male who was brought by ambulance from home after patient's sister states that speech seemed a little garbled.  He had a fall 2 days ago with very similar symptoms and was taken to [facility] and discharged.  However they just had a plain CT and a neck CT.  Tonight he felt very weak he states and fell again.  He is also had a headache and is just not felt well.  Medics state he was moving all his extremities but does have a history of a prior stroke but his speech did seem off to them.  The patient was brought in as a code stroke.    Physical Exam     ED Triage Vitals   BP 01/31/25 0146 118/49   Pulse 01/31/25 0146 73   Resp 01/31/25 0244 20   Temp 01/31/25 0146 98 °F (36.7 °C)   Temp src 01/31/25 0146 Oral   SpO2 01/31/25 0146 92 %   O2 Device 01/31/25 0146 None (Room air)       Current Vitals:   Vital Signs  BP: (!) 83/40  Pulse: 79  Resp: 22  Temp: 98 °F (36.7 °C)  Temp src: Oral    Oxygen Therapy  SpO2: 99 %  O2 Device: None (Room air)    Vital signs reviewed  General appearance: Patient is alert answers questions but speech does seem off and a little slurred, patient is also hypotensive  HEENT: Pupils equal react to light extraocular muscles intact no scleral icterus, mucous membranes are moist, there is no erythema or exudate in the posterior pharynx  Neck: Supple no JVD no lymphadenopathy no meningismus no carotid bruit  CV: Regular rate and rhythm no murmur rub  Respiratory: Clear to auscultation bilaterally no crackles no wheezes no accessory muscle use  Abdomen: Soft nontender nondistended, no rebound no guarding   no hepatosplenomegaly bowel sounds are present , no pulsatile mass  Extremities: No clubbing cyanosis or edema 2+ distal pulses.  Neuro: Cranial nerves II through XII intact with no gross focal sensory or motor abnormality.  Patient moving all extremities and follows commands      ED Course     Labs Reviewed   COMP METABOLIC PANEL (14) - Abnormal; Notable for the following components:       Result Value    Glucose 157 (*)     Sodium 135 (*)     CO2 19.0 (*)     BUN 40 (*)     Creatinine 1.50 (*)     eGFR-Cr 45 (*)     Bilirubin, Total 1.7 (*)     All other components within normal limits   CBC WITH DIFFERENTIAL WITH PLATELET - Abnormal; Notable for the following components:    WBC 3.8 (*)     HCT 38.5 (*)     .0 (*)     All other components within normal limits   PROTHROMBIN TIME (PT) - Abnormal; Notable for the following components:    PT 21.7 (*)     INR 1.87 (*)     All other components within normal limits   LACTIC ACID, PLASMA - Abnormal; Notable for the following components:    Lactic Acid 2.5 (*)     All other components within normal limits   URINALYSIS WITH CULTURE REFLEX - Abnormal; Notable for the following components:    Clarity Urine Turbid (*)     Ketones Urine Trace (*)     Blood Urine 3+ (*)     Protein Urine 100 (*)     Nitrite Urine 2+ (*)     Leukocyte Esterase Urine 500 (*)     WBC Urine >50 (*)     RBC Urine >10 (*)     Bacteria Urine 1+ (*)     Squamous Epi. Cells Few (*)     Hyaline Casts Present (*)     WBC Clump Present (*)     All other components within normal limits   MANUAL DIFFERENTIAL - Abnormal; Notable for the following components:    Lymphocyte Absolute Manual 0.80 (*)     Monocyte Absolute Manual 0.08 (*)     Metamyelocyte Absolute Manual 0.04 (*)     All other components within normal limits   POCT GLUCOSE - Abnormal; Notable for the following components:    POC Glucose 147 (*)     All other components within normal limits   PTT, ACTIVATED - Normal   TROPONIN I HIGH  SENSITIVITY - Normal   RAPID SARS-COV-2 BY PCR - Normal   LACTIC ACID REFLEX POST POSTIVE   BLOOD CULTURE   BLOOD CULTURE   URINE CULTURE, ROUTINE     EKG    Rate, intervals and axes as noted on EKG Report.  Rate: 72  Rhythm: Sinus Rhythm  Reading: Normal sinus rhythm    Patient was evaluated and had a CBC chemistry coags lactic acid troponin COVID flu and blood cultures.  Patient's white count was low at 3.  His lactic acid was 2.5.  His urinalysis showed 3+ blood 2+ nitrates 500 leuks and bacteria.  Based on his initial presentation he also felt warm to touch but temp was only 99.  The fact that he is hypotensive here and lactic acid elevated I am more inclined to think he is septic but I did send him for a CT CTA.    CT HEAD WITHOUT CONTRAST  CTA COW WITH CONTRAST  CTA NECK WITH CONTRAST      COMPARISON: 2/23/2022    IMPRESSION:    No acute intracranial hemorrhage, mass effect, midline shift, or hydrocephalus.    Expected evolution of previously seen right cerebral hemisphere strokes.  No definite new loss of gray-white matter differentiation, although MRI would have higher sensitivity for recent ischemia, with evaluation particularly limited along the right cerebral hemisphere due to old infarcts.  No acute calvarial fracture.    CTA COW:  No significant stenosis or occlusion of the major proximal intracranial arteries.  Fetal origin of the left PCA    CTA NECK:  No significant stenosis or occlusion of the major cervical arteries.    CTA CT did not show anything acute except for previously seen cerebellar hemisphere strokes.  Due to the urinalysis I do feel he is probably got pyelonephritis which is what is making him feel weak and slightly confused.  He was given a septic fluid bolus at 30/kg and still remained hypotensive with a MAP of 52.  Levophed was started.  Patient will be admitted to the ICU.  IV antibiotics Rocephin was given.  Did discuss case with critical care and the hospitalist and they agree with  plan    Levophed at 10 mics had a MAP of 65          Disposition and Plan     Clinical Impression:  1. Disorientation    2. Sepsis due to undetermined organism (HCC)    3. Sepsis with hypotension (HCC)    4. Hyponatremia    5. Thrombocytopenia    6. Metabolic acidosis    7. Pyelonephritis         Disposition:  Admit  1/31/2025  3:19 am       TNK/ NI Documentation:    Date/Time last known well:   1/31/2025    NIHSS on presentation: 1     Chief Complaint   Patient presents with    Stroke     IV Tenecteplase (TNK) administered:  Patient was not having an active stroke    Candidate for Endovascular thrombectomy (EVT): No; Patient is not a candidate for Endovascular Thrombectomy due to: No large vessel occlusion ( LVO)  on CTA/MRA imaging      Disposition: Admit        Signed by Jose Johnston MD on 1/31/2025  4:16 AM         HISTORY AND PHYSICAL      On initial evaluation in the ER noted to be quite hypotensive, labs consistent with UTI and admitted to ICU with pressors. Evaluated patient in the ICU and they will patient much more responsive, back to baseline mentation ANO x 3, answering questions appropriately. Does note that he has been having dysuria for the past week. Denies any other fevers, chills, chest pain, shortness of breath, abdominal pain       ASSESSMENT / PLAN:      # Septic shock 2/2 UTI   - IV abx continued, f/u Bcx, Ucx   - Pressors/stress dose steroids per pulm/critical care   - TSH/AM cortisol    - s/p sepsis bolus in ER      # CAD s/p CABG   # Atrial fibrillation - Amio; A/C with Apixaban  # Hypertension - Hold home oral antihypertensives  # Hypothyroidism - continue home levothyroxine   # Hyperlipidemia - Hold home statin      CRITICAL CARE CONSULT NOTE:     Assessment and Plan:  Jose Alfredo Solis is a 85 year old male admitted to ICU for      Neuro/Psych: Weakness mild encephalopathy likely secondary to septic shock CT head CT angiography no acute process     Cardiovascular: Septic shock secondary  to urinary tract infection with lactic acidosis will need modified early goal-directed therapy norepinephrine department is 15 mcg therefore patient will likely need midline versus central line and possible arterial line  Modified early goal-directed therapy  Central venous sats CVP serial lactates  Bedside ultrasound shows inferior vena cava that is not variable status post 30 cc/kg  Preserved EF     History of hypertension on beta-blocker sotalol could explain the relative bradycardia  Hold HCTZ and losartan     History of atrial fibrillation presumably paroxysmal as he is in sinus now currently rate controlled in sinus  Continue factor Xa     Pulmonary: No signs of respiratory failure patient on room air chest x-ray without infiltrate     GI: No abdominal pathology no nausea vomiting no transaminitis     Renal/Metabolic: Acute kidney injury at least based on labs from October 2024.  Presumably secondary to ATN patient did receive contrast as well today for the CT angiography noted     Heme: No significant anemia with low normal thrombocytopenia baseline in the 200 range could be consumptive??  Continue factor Xa     Infectious Disease: Leukopenia in the setting of septic shock concerning  Though community-acquired and so ceftriaxone will use cefepime narrow as appropriate based on urine culture markedly positive urinalysis   If patient not significantly better may require CT abdomen pelvis to rule out perinephric abscess and/or complicated pyelonephritis and/or retained stone  We will add procalcitonin      ICU checklist  Feeding: Cardiac diet  Analgesia: None  Sedation: None  Thromboembolism PPX: Factor Xa  Stress Ulcer PPX: None  Glucose control: None  Bowel Regimen:   Lines/drains/tubes:   Deescalation of antibiotics:   Therapies: PT/OT/ST when appropriate    Date of Service: 1/31/2025  8:48 AM       Patient doing well this morning.  Says he feels much better than he did earlier his lactate is cleared he did  have leukopenia now he has marked leukocytosis with a concerning admission story for urinary sepsis.     His labs are demonstrating a sodium of 139 creatinine of 1.1 a bicarb of 19 and AST of 43 with an ALT of 34 and INR of 1.9 white blood cell count of 26,000 hemoglobin 11 and platelet count 225,000.  His lactate as I mentioned had cleared.  His blood glucose was up.     The patient's urine did have significant abnormalities including white leuks nitrites and bacteria concerning for acute infectious process.  He did not undergo a CT scan to evaluate for possible obstructing stone it does not look like she has had CT scans in the past of the belly that would demonstrate us if there is hydro given that he is improving going to avoid a CT scan but I am going to get a Doppler of the kidneys to rule out hydronephrosis.  If there is any hydronephrosis we would go ahead and get a CT scan to further evaluate.     The patient did get a total of 4.3 L of fluid overnight and does have some urine output today.     In summary this is an 85-year-old male that presents to us with weakness fatigue altered mental status concerning for septic shock     Problems  Leukopenia  Urinary sepsis  Septic shock  Hypothyroidism  Atrial fibrillation  Hypotension  Chronic history of hypertension on beta-blocker including sotalol and hydrochlorothiazide and losartan  Toxic metabolic encephalopathy     Plan  Neuro  Patient was awake and alert and oriented when I saw him  He was following all commands     Cardiovascular  Septic shock  Remains on levo and norepinephrine doses are downtrending  Have not added stress dose steroids at this point for able to come down on the dose we may withhold this from him which may worsen some confusion or delirium in the ICU     Hypertension  Holding all his home meds including his ARB which can worsen kidney injury enalapril for now     A-fib  Holding his sotalol and his metoprolol given the renal dysfunction and  the fact that the patient is relatively bradycardic probably due to some viviana blockade at this time     We will keep his amiodarone 200 mg daily and he is being maintained on Eliquis though I may need to hold his dose depending on what the ultrasound of the kidney shows if the patient is not improving and has hydronephrosis anticoagulation may preclude a procedure so I would consider holding his Eliquis until tomorrow 6 AM     Respiratory  Is he has no active respiratory issues breathing comfortably at this time     Renal  His creatinine remains stable around 1.17 he is urinating we will continue to follow     Infectious disease  He remains on cefepime we will follow-up results of urinalysis and blood cultures anticipate him to have gram-negative sepsis likely in both his urine and his blood given the degree of contamination in his UA and his leukopenia and rigors at home.     Endo  I do not think he will need stress dose steroids  He is not a diabetic  Will put him on an insulin sliding scale     Tubes lines and drains  He has a triple-lumen and art line for the time being     He is on 2 pressors which are improving markedly and anticipate tomorrow will be able to get both of these lines out     Disposition medical ICU     Critical care attending receiving vasoactive medications            MEDICATIONS ADMINISTERED IN LAST 1 DAY:  amiodarone (Pacerone) tab 200 mg       Date Action Dose Route User    1/31/2025 0804 Given 200 mg Oral Giovanny Delgado RN          apixaban (Eliquis) tab 5 mg       Date Action Dose Route User    1/31/2025 0804 Given 5 mg Oral Giovanny Delgado RN          ceFEPIme (Maxipime) 1 g in sodium chloride 0.9% 100 mL IVPB-MBP       Date Action Dose Route User    1/31/2025 0940 New Bag 1 g Intravenous Giovanny Delgado RN          cefTRIAXone (Rocephin) 1 g in sodium chloride 0.9% 100 mL IVPB-ADDV       Date Action Dose Route User    1/31/2025 0248 New Bag 1 g Intravenous Carmelita Maldonado,  JOEY          iopamidol 76% (ISOVUE-370) injection for power injector       Date Action Dose Route User    1/31/2025 0130 Given 75 mL Intravenous (Left Antecubital) Beck Rangel          levothyroxine (Synthroid) tab 25 mcg       Date Action Dose Route User    1/31/2025 0803 Given 25 mcg Oral Giovanny Delgado RN          norepinephrine (Levophed) 4 mg/250mL infusion premix       Date Action Dose Route User    1/31/2025 0804 Rate/Dose Change 1 mcg/min Intravenous Giovanny Delgado RN    1/31/2025 0803 Rate/Dose Change 2 mcg/min Intravenous Giovanny Delgado RN    1/31/2025 0802 Rate/Dose Change 3 mcg/min Intravenous Giovanny Delgado RN    1/31/2025 0801 Rate/Dose Change 4 mcg/min Intravenous Giovanny Delgado RN    1/31/2025 0800 Rate/Dose Change 5 mcg/min Intravenous Giovanny Delgado RN    1/31/2025 0408 Rate/Dose Change 15.013 mcg/min Intravenous Pedro Carrion RN    1/31/2025 0344 Rate/Dose Change 10 mcg/min Intravenous Pedro Carrion RN    1/31/2025 0336 New Bag 5 mcg/min Intravenous Pedro Carrion RN          pantoprazole (Protonix) DR tab 40 mg       Date Action Dose Route User    1/31/2025 1051 Given 40 mg Oral Giovanny Delgado RN          sodium chloride 0.9 % IV bolus 2,286 mL       Date Action Dose Route User    1/31/2025 0340 New Bag 286 mL Intravenous Pedro Carrion RN    1/31/2025 0221 New Bag 2,286 mL Intravenous Carmelita Maldonado RN          vasopressin (Vasostrict) 20 Units in sodium chloride 0.9% 100 mL infusion for septic shock       Date Action Dose Route User    1/31/2025 0536 New Bag 0.03 Units/min Intravenous Margarita Taylor, RN            Vitals (last day)       Date/Time Temp Pulse Resp BP SpO2 Weight O2 Device O2 Flow Rate (L/min) Who    01/31/25 1300 -- 71 20 120/47 94 % -- -- -- MICHELLE    01/31/25 1200 97.8 °F (36.6 °C) 70 19 113/50 94 % -- None (Room air) -- MICHELLE    01/31/25 0645 -- 81 17 -- 94 % -- -- -- CYNTHIA    01/31/25 0630 -- 74 20 -- 94 % -- -- -- CYNTHIA    01/31/25  0615 -- 71 20 -- 92 % -- -- -- CYNTHIA    01/31/25 0600 -- 77 14 109/52 88 % -- None (Room air) -- CYNTHIA    01/31/25 0545 -- 78 19 102/43 95 % -- -- -- CYNTHIA    01/31/25 0530 -- 75 21 88/43 94 % -- -- -- CYNTHIA    01/31/25 0515 -- 76 17 109/51 93 % -- -- -- CYNTHIA    01/31/25 0500 -- 80 18 101/45 91 % -- None (Room air) -- CYNTHIA    01/31/25 0450 99 °F (37.2 °C) 81 16 107/47 96 % 158 lb 4.6 oz (71.8 kg) None (Room air) -- CYNTHIA    01/31/25 0437 -- 79 -- 95/49 96 % -- None (Room air) -- BG    01/31/25 0408 -- 79 -- 93/49 98 % -- None (Room air) -- BG    01/31/25 0346 -- 79 -- 83/40 99 % -- None (Room air) -- BG    01/31/25 0341 -- 74 22 78/41 98 % -- None (Room air) -- BG    01/31/25 02:45:57 -- 78 18 94/44 99 % -- None (Room air) -- PS    01/31/25 0244 -- 81 20 86/41 99 % -- None (Room air) -- PS    01/31/25 02:43:23 -- -- -- -- 99 % -- None (Room air) -- PS    01/31/25 01:55:06 -- -- -- -- -- -- None (Room air) -- PS    01/31/25 0146 98 °F (36.7 °C) 73 -- 118/49 92 % 167 lb 15.9 oz (76.2 kg) None (Room air) -- PS

## 2025-01-31 NOTE — CONSULTS
CRITICAL CARE CONSULT NOTE:     Patient Name: Jose Alfredo Solis  : 1940  MRN: [id number]  Admit Date: 2025  Length of Stay: 0 Days    HPI:  Jose Alfredo Solis is a 85 year old male with a past medical history of coronary artery disease with bypass grafting atrial fibrillation on factor Xa inhibitor dyslipidemia hypertension BPH previous stroke with no deficits who presents to the hospital today with extreme weakness inability to stand.  Patient was seen in urgent care yesterday and at [facility] with what appeared to be fall where a CT head CT cervical spine was done and the patient was discharged after radiographic imaging.  Reportedly patient came home at 5 AM and was sleeping all day patient was with the sister who noticed that the patient was extremely weak and therefore brought him into the emergency department here in the emergency department patient was hypotensive especially after receiving 30 cc/kg therefore was started on norepinephrine found to have a urinary tract infection.  Initially patient was worked up as a stroke as he had slurred speech etc. CT head CT angiography reportedly no acute process.  When I saw the patient he was on 15 mcg of norepinephrine he was oriented x 2 however felt that he was at good Edi and then when I corrected him was oriented x 3 and was able to give a full history.  Patient was given ceftriaxone we will bring the patient to the intensive care unit for septic shock management      Chronic health conditions include the following:     ROS as above    Past Medical, Surgical, Family and Social History  Patient has a past medical history of High cholesterol, Hypertension, Hypothyroid, and Stroke (HCC) (2018).    Patient has a past surgical history that includes other surgical history and cabg.    Patient family history includes Other in his father.    Patient reports that he quit smoking about 56 years ago. He has never used smokeless tobacco. He reports  current alcohol use. He reports that he does not use drugs.    Allergies  Patient has No Known Allergies.    Physical Exam  General: No acute distress  Neuro: AOx3, non focal   Lungs: Clear  Cardio: Irregularly irregular Abdomen: Soft, non tended, non distended   Extremities: Warm, no swelling Cap Refill <2 Sec      Hemodynamics  24h Vitals:  VitalLast Value (24 Hour)24 Hour Range  Temp98 °F (36.7 °C)Temp  Min: 98 °F (36.7 °C)  Max: 98 °F (36.7 °C)  [id number]  Min: 73  Max: 81  BP (Non-Invasive)95/49 BP  Min: 78/41  Max: 118/49  BP (A-Line) No data recorded  CVP  could not be evaluated. This SmartLink does not work with rows of the type:   [id number]  Min: 18  Max: 22  [id number] %SpO2  Min: 92 %  Max: 99 %  O2 Therapy       Assessment and Plan:  Jose Alfredo Solis is a 85 year old male admitted to ICU for     Neuro/Psych: Weakness mild encephalopathy likely secondary to septic shock CT head CT angiography no acute process    Cardiovascular: Septic shock secondary to urinary tract infection with lactic acidosis will need modified early goal-directed therapy norepinephrine department is 15 mcg therefore patient will likely need midline versus central line and possible arterial line  Modified early goal-directed therapy  Central venous sats CVP serial lactates  Bedside ultrasound shows inferior vena cava that is not variable status post 30 cc/kg  Preserved EF    History of hypertension on beta-blocker sotalol could explain the relative bradycardia  Hold HCTZ and losartan    History of atrial fibrillation presumably paroxysmal as he is in sinus now currently rate controlled in sinus  Continue factor Xa    Pulmonary: No signs of respiratory failure patient on room air chest x-ray without infiltrate    GI: No abdominal pathology no nausea vomiting no transaminitis    Renal/Metabolic: Acute kidney injury at least based on labs from October 2024.  Presumably secondary to ATN patient did receive contrast as well today for the CT  angiography noted    Heme: No significant anemia with low normal thrombocytopenia baseline in the 200 range could be consumptive??  Continue factor Xa    Infectious Disease: Leukopenia in the setting of septic shock concerning  Though community-acquired and so ceftriaxone will use cefepime narrow as appropriate based on urine culture markedly positive urinalysis   If patient not significantly better may require CT abdomen pelvis to rule out perinephric abscess and/or complicated pyelonephritis and/or retained stone  We will add procalcitonin    Endocrine: History of hypothyroidism continue home Synthroid check TSH      ICU checklist  Feeding: Cardiac diet  Analgesia: None  Sedation: None  Thromboembolism PPX: Factor Xa  Stress Ulcer PPX: None  Glucose control: None  Bowel Regimen:   Lines/drains/tubes:   Deescalation of antibiotics:   Therapies: PT/OT/ST when appropriate    Code Status discussed with patient full code    Upon my evaluation, this patient had a high probability of imminent or life-threatening deterioration due to shock and sepsis, which required my direct attention, intervention, and personal management.    I have personally provided 40 minutes of critical care time, exclusive of time spent on separately billable procedures.  Time includes review of all pertinent laboratory/radiology results, discussion with consultants, and monitoring for potential decompensation.  Performed interventions included fluids and pressor drugs.

## 2025-01-31 NOTE — H&P
[facility]     Jose Alfredo Solis Patient Status:  Emergency    1940 MRN [id number]   Location [facility] EMERGENCY DEPARTMENT Attending Jose Johnston MD   Hosp Day # 0 PCP MIKAYLA BAUGH, MD ROSEANN     Chief Complaint: weakness    History of Present Illness: Jose Alfredo Solis is a 85 year old male with PMHx CAD s/p CABG, Afib, HTN, HLD, Hypothyroidism, who presents for weakness.     Patient brought in by ambulance for confusion, change in mentation.  Patient states that he had a fall after he was walking with his walking rollator, was evaluated at [facility] and cleared and discharged back to home.  However sister noted that he was not talking normally.  Initially brought in as a code stroke, CT imaging without intracranial hemorrhage, large vessel occlusion.  On initial evaluation in the ER noted to be quite hypotensive, labs consistent with UTI and admitted to ICU with pressors.  Evaluated patient in the ICU and they will patient much more responsive, back to baseline mentation ANO x 3, answering questions appropriately.  Does note that he has been having dysuria for the past week.  Denies any other fevers, chills, chest pain, shortness of breath, abdominal pain    Past Medical History:  Past Medical History:    High cholesterol    Hypertension    Hypothyroid    Stroke (HCC)        Past Surgical History:   Past Surgical History:   Procedure Laterality Date    Cabg      Other surgical history      prostate surgery       Social History:  reports that he quit smoking about 56 years ago. He has never used smokeless tobacco. He reports current alcohol use. He reports that he does not use drugs.    Family History:   Family History   Problem Relation Age of Onset    Other (Other) Father         leukemia       Allergies: Allergies[1]    Medications:  Medications Ordered Prior to Encounter[2]    Review of Systems:   A comprehensive 14 point review of systems was completed.     Pertinent positives and negatives noted in the HPI.    Physical Exam:    BP 94/44   Pulse 78   Temp 98 °F (36.7 °C) (Oral)   Resp 18   Wt 167 lb 15.9 oz (76.2 kg)   SpO2 99%   BMI 24.81 kg/m²   General: No acute distress. Alert and oriented x 3.  HEENT: Normocephalic atraumatic. Moist mucous membranes. EOM-I. PERRLA. Anicteric.  Neck: No lymphadenopathy. No JVD. No carotid bruits.  Respiratory: Clear to auscultation bilaterally. No wheezes. No rhonchi.  Cardiovascular: S1, S2. Regular rate and rhythm. No murmurs, rubs or gallops. Equal pulses.   Chest and Back: No tenderness or deformity.  Abdomen: Soft, nontender, nondistended.  Positive bowel sounds. No rebound, guarding or organomegaly.  Neurologic: No focal neurological deficits. CNII-XII grossly intact.  Musculoskeletal: Moves all extremities.  Extremities: No edema or cyanosis.  Integument: No rashes or lesions.   Psychiatric: Appropriate mood and affect.    Diagnostic Data:      Labs:  Recent Labs   Lab 01/31/25 0103   WBC 3.8*   HGB 13.4   MCV 90.8   .0*   BAND 8   INR 1.87*       Recent Labs   Lab 01/31/25 0103   *   BUN 40*   CREATSERUM 1.50*   CA 10.0   ALB 4.6   *   K 4.4      CO2 19.0*   ALKPHO 59   AST 32   ALT 29   BILT 1.7*   TP 7.2       CrCl cannot be calculated (Unknown ideal weight.).    Recent Labs   Lab 01/31/25 0103   PTP 21.7*   INR 1.87*       No results for input(s): \"TROP\", \"CK\" in the last 168 hours.    Imaging: Imaging data reviewed in Epic.  No results found.      ASSESSMENT / PLAN:     # Septic shock 2/2 UTI   - IV abx continued, f/u Bcx, Ucx   - Pressors/stress dose steroids per pulm/critical care   - TSH/AM cortisol    - s/p sepsis bolus in ER     # CAD s/p CABG   # Atrial fibrillation - Amio; A/C with Apixaban  # Hypertension - Hold home oral antihypertensives  # Hypothyroidism - continue home levothyroxine   # Hyperlipidemia - Hold home statin       Code Status: Not on file    Plan of care  discussed with patient, ED physician    Payam Bailey MD  1/31/2025      Supplementary Documentation:      MDM : Patient's ER labs, imaging reviewed.  AM labs ordered.  ER management discussed with ED physician, decision made for patient to be admitted to the hospital for further medical management.                  [1] No Known Allergies  [2]   No current facility-administered medications on file prior to encounter.     Current Outpatient Medications on File Prior to Encounter   Medication Sig Dispense Refill    apixaban 5 MG Oral Tab Take 5 mg by mouth 2 (two) times daily.      aspirin 325 MG Oral Tab Take 325 mg by mouth 2 (two) times daily.      Losartan Potassium 25 MG Oral Tab Take 25 mg by mouth daily. (Patient not taking: Reported on 2/23/2022)      Sotalol HCl 80 MG Oral Tab Take 80 mg by mouth 2 (two) times daily.      Metoprolol Succinate ER 25 MG Oral Tablet 24 Hr Take 25 mg by mouth daily.      Enalapril Maleate 10 MG Oral Tab Take 10 mg by mouth daily.      Levothyroxine Sodium 25 MCG Oral Tab Take 25 mcg by mouth before breakfast. (Patient not taking: Reported on 2/23/2022)      tamsulosin HCl 0.4 MG Oral Cap Take by mouth daily.      hydrochlorothiazide 12.5 MG Oral Tab Take 12.5 mg by mouth daily. (Patient not taking: Reported on 2/23/2022)      Multiple Vitamins-Minerals (EYE VITAMINS OR) Take by mouth.

## 2025-01-31 NOTE — PROCEDURES
Arterial Line  Performed by: WICHO Bailey     General Information and Staff     Procedure Start:   Patient Location:  ICU  Indication: continuous blood pressure monitoring and blood sampling needed    Site Identification: real time ultrasound guided, sterile technique used     Procedure Details     Catheter Size:  20 G  Catheter Length:  1 and 3/4 inchCatheter Type:  Arrow  Seldinger Technique?: Yes    Laterality:  Right radial  Site:     Site Prep: chlorhexidine  Line Secured:  Tape and Tegaderm     Assessment: Juan Miguel's test negative, Good flash, guidewire and catheter advanced without difficulty, pulsatile blood flow noted.    Events: patient tolerated procedure well with no complications

## 2025-01-31 NOTE — ED INITIAL ASSESSMENT (HPI)
Pt brought in via EMS from home. Per sister, pt seems garbled. Pt had fall 2 days ago and was taken to [facility] and discharged. Pt fell again last night at around 2200. Pt denies hitting his head. No LOC. Pt on thinners. Pt has hx of CVA.

## 2025-01-31 NOTE — PROGRESS NOTES
Patient doing well this morning.  Says he feels much better than he did earlier his lactate is cleared he did have leukopenia now he has marked leukocytosis with a concerning admission story for urinary sepsis.    His labs are demonstrating a sodium of 139 creatinine of 1.1 a bicarb of 19 and AST of 43 with an ALT of 34 and INR of 1.9 white blood cell count of 26,000 hemoglobin 11 and platelet count 225,000.  His lactate as I mentioned had cleared.  His blood glucose was up.    The patient's urine did have significant abnormalities including white leuks nitrites and bacteria concerning for acute infectious process.  He did not undergo a CT scan to evaluate for possible obstructing stone it does not look like she has had CT scans in the past of the belly that would demonstrate us if there is hydro given that he is improving going to avoid a CT scan but I am going to get a Doppler of the kidneys to rule out hydronephrosis.  If there is any hydronephrosis we would go ahead and get a CT scan to further evaluate.    The patient did get a total of 4.3 L of fluid overnight and does have some urine output today.    In summary this is an 85-year-old male that presents to us with weakness fatigue altered mental status concerning for septic shock    Problems  Leukopenia  Urinary sepsis  Septic shock  Hypothyroidism  Atrial fibrillation  Hypotension  Chronic history of hypertension on beta-blocker including sotalol and hydrochlorothiazide and losartan  Toxic metabolic encephalopathy    Plan  Neuro  Patient was awake and alert and oriented when I saw him  He was following all commands    Cardiovascular  Septic shock  Remains on levo and norepinephrine doses are downtrending  Have not added stress dose steroids at this point for able to come down on the dose we may withhold this from him which may worsen some confusion or delirium in the ICU    Hypertension  Holding all his home meds including his ARB which can worsen kidney  injury enalapril for now    A-fib  Holding his sotalol and his metoprolol given the renal dysfunction and the fact that the patient is relatively bradycardic probably due to some viivana blockade at this time    We will keep his amiodarone 200 mg daily and he is being maintained on Eliquis though I may need to hold his dose depending on what the ultrasound of the kidney shows if the patient is not improving and has hydronephrosis anticoagulation may preclude a procedure so I would consider holding his Eliquis until tomorrow 6 AM    Respiratory  Is he has no active respiratory issues breathing comfortably at this time    Renal  His creatinine remains stable around 1.17 he is urinating we will continue to follow    Infectious disease  He remains on cefepime we will follow-up results of urinalysis and blood cultures anticipate him to have gram-negative sepsis likely in both his urine and his blood given the degree of contamination in his UA and his leukopenia and rigors at home.    Endo  I do not think he will need stress dose steroids  He is not a diabetic  Will put him on an insulin sliding scale    Tubes lines and drains  He has a triple-lumen and art line for the time being    He is on 2 pressors which are improving markedly and anticipate tomorrow will be able to get both of these lines out    Disposition medical ICU    Critical care attending receiving vasoactive medications    Date of service was January 31, 2025    Upon my evaluation, this patient had a high probability of imminent or life-threatening deterioration due to hypoxemia, shock, and respiratory failure, which required my direct attention, intervention, and personal management.    I have personally provided 39 minutes of critical care time, exclusive of time spent on separately billable procedures.  Time includes review of all pertinent laboratory/radiology results, discussion with consultants, and monitoring for potential decompensation.  Performed  interventions included fluids, pressor drugs, oxygen, and vasoactive medications.

## 2025-01-31 NOTE — PLAN OF CARE
Pt received after change of shift report. Pt alert and oriented x3. Following commands. No reports of pain. Pupils reactive. On room air. Oxygen saturation stable. Afebrile. Atrial pacing on telemetry monitor. Pt received on Norepinephrine and Vasopressin. Pressors titrated off. Blood pressure stable. Diet started today. BM x1. Voiding in urinal. Family at bedside and updated on plan of care.

## 2025-01-31 NOTE — PROCEDURES
Procedure Note    Procedure: Right Internal Jugular central venous cath inserted into Jugular Vein.  Informed consent obtained.  All questions answered.  Pt prepped and draped in sterile fashion.  IJ visualized with Ultrasound Guidance.  Catheter placed over guidewire.  All three ports geovani and flushed easily.  Line sutured in place.  X-ray ordered to confirm placement and r/o pneumothorax.      Anes: Local Lidocaine    Indication: Septic shock    Result:  Good blood flow x3 ports    Complications: None, CXR pending.      Lazaro Bailey MD  6:09 AM

## 2025-01-31 NOTE — ED PROVIDER NOTES
Patient Seen in: [facility] Emergency Department      History     Chief Complaint   Patient presents with    Stroke     Stated Complaint:     Subjective:   HPI      Patient is an 85-year-old male who was brought by ambulance from home after patient's sister states that speech seemed a little garbled.  He had a fall 2 days ago with very similar symptoms and was [facility] and [facility].  However they just had a plain CT and a neck CT.  Tonight he felt very weak he states and fell again.  He is also had a headache and is just not felt well.  Medics state he was moving all his extremities but does have a history of a prior stroke but his speech did seem off to them.  The patient was brought in as a code stroke.    Objective:     No pertinent past medical history.            No pertinent past surgical history.              No pertinent social history.                Physical Exam     ED Triage Vitals   BP 01/31/25 0146 118/49   Pulse 01/31/25 0146 73   Resp 01/31/25 0244 20   Temp 01/31/25 0146 98 °F (36.7 °C)   Temp src 01/31/25 0146 Oral   SpO2 01/31/25 0146 92 %   O2 Device 01/31/25 0146 None (Room air)       Current Vitals:   Vital Signs  BP: (!) 83/40  Pulse: 79  Resp: 22  Temp: 98 °F (36.7 °C)  Temp src: Oral    Oxygen Therapy  SpO2: 99 %  O2 Device: None (Room air)        Physical Exam    Vital signs reviewed  General appearance: Patient is alert answers questions but speech does seem off and a little slurred, patient is also hypotensive  HEENT: Pupils equal react to light extraocular muscles intact no scleral icterus, mucous membranes are moist, there is no erythema or exudate in the posterior pharynx  Neck: Supple no JVD no lymphadenopathy no meningismus no carotid bruit  CV: Regular rate and rhythm no murmur rub  Respiratory: Clear to auscultation bilaterally no crackles no wheezes no accessory muscle use  Abdomen: Soft nontender nondistended, no rebound no guarding  no hepatosplenomegaly bowel  sounds are present , no pulsatile mass  Extremities: No clubbing cyanosis or edema 2+ distal pulses.  Neuro: Cranial nerves II through XII intact with no gross focal sensory or motor abnormality.  Patient moving all extremities and follows commands      ED Course     Labs Reviewed   COMP METABOLIC PANEL (14) - Abnormal; Notable for the following components:       Result Value    Glucose 157 (*)     Sodium 135 (*)     CO2 19.0 (*)     BUN 40 (*)     Creatinine 1.50 (*)     eGFR-Cr 45 (*)     Bilirubin, Total 1.7 (*)     All other components within normal limits   CBC WITH DIFFERENTIAL WITH PLATELET - Abnormal; Notable for the following components:    WBC 3.8 (*)     HCT 38.5 (*)     .0 (*)     All other components within normal limits   PROTHROMBIN TIME (PT) - Abnormal; Notable for the following components:    PT 21.7 (*)     INR 1.87 (*)     All other components within normal limits   LACTIC ACID, PLASMA - Abnormal; Notable for the following components:    Lactic Acid 2.5 (*)     All other components within normal limits   URINALYSIS WITH CULTURE REFLEX - Abnormal; Notable for the following components:    Clarity Urine Turbid (*)     Ketones Urine Trace (*)     Blood Urine 3+ (*)     Protein Urine 100 (*)     Nitrite Urine 2+ (*)     Leukocyte Esterase Urine 500 (*)     WBC Urine >50 (*)     RBC Urine >10 (*)     Bacteria Urine 1+ (*)     Squamous Epi. Cells Few (*)     Hyaline Casts Present (*)     WBC Clump Present (*)     All other components within normal limits   MANUAL DIFFERENTIAL - Abnormal; Notable for the following components:    Lymphocyte Absolute Manual 0.80 (*)     Monocyte Absolute Manual 0.08 (*)     Metamyelocyte Absolute Manual 0.04 (*)     All other components within normal limits   POCT GLUCOSE - Abnormal; Notable for the following components:    POC Glucose 147 (*)     All other components within normal limits   PTT, ACTIVATED - Normal   TROPONIN I HIGH SENSITIVITY - Normal   RAPID  SARS-COV-2 BY PCR - Normal   LACTIC ACID REFLEX POST POSTIVE   RAINBOW DRAW LAVENDER   RAINBOW DRAW LIGHT GREEN   RAINBOW DRAW GOLD   RAINBOW DRAW BLUE   BLOOD CULTURE   BLOOD CULTURE   URINE CULTURE, ROUTINE     EKG    Rate, intervals and axes as noted on EKG Report.  Rate: 72  Rhythm: Sinus Rhythm  Reading: Normal sinus rhythm                Patient was evaluated and had a CBC chemistry coags lactic acid troponin COVID flu and blood cultures.  Patient's white count was low at 3.  His lactic acid was 2.5.  His urinalysis showed 3+ blood 2+ nitrates 500 leuks and bacteria.  Based on his initial presentation he also felt warm to touch but temp was only 99.  The fact that he is hypotensive here and lactic acid elevated I am more inclined to think he is septic but I did send him for a CT CTA.     CT HEAD WITHOUT CONTRAST  CTA COW WITH CONTRAST  CTA NECK WITH CONTRAST      COMPARISON: 2/23/2022    IMPRESSION:    No acute intracranial hemorrhage, mass effect, midline shift, or hydrocephalus.    Expected evolution of previously seen right cerebral hemisphere strokes.  No definite new loss of gray-white matter differentiation, although MRI would have higher sensitivity for recent ischemia, with evaluation particularly limited along the right cerebral hemisphere due to old infarcts.  No acute calvarial fracture.    CTA COW:  No significant stenosis or occlusion of the major proximal intracranial arteries.  Fetal origin of the left PCA    CTA NECK:  No significant stenosis or occlusion of the major cervical arteries.    CTA CT did not show anything acute except for previously seen cerebellar hemisphere strokes.  Due to the urinalysis I do feel he is probably got pyelonephritis which is what is making him feel weak and slightly confused.  He was given a septic fluid bolus at 30/kg and still remained hypotensive with a MAP of 52.  Levophed was started.  Patient will be admitted to the ICU.  IV antibiotics Rocephin was given.   Did discuss case with critical care and the hospitalist and they agree with plan    Levophed at [facility] had a MAP of 65    Patient was seen immediately upon arrival due to a high probability of sudden, clinically significant, or life threatening deterioration of the patient's clinical status.  The patient required my time at the bedside performing direct personal management, the absence of which would likely result in sudden, clinically significant or life threatening deterioration of  clinical condition.   The patient required my constant attention. Time was spent ordering, reviewing, and interpreting ancillary testing and imaging. The patient was frequently reevaluated, and I made frequent checks of  vital signs and monitor. Nursing notes were reviewed.     Critical care time was[120 minutes], and exclusive of procedures.      MDM      Differential diagnosis reflecting the complexity of care include: Stroke, altered mental status, sepsis, infection, pyelonephritis    Comorbidities that add complexity to management include: History of strokes in the past    External chart review was done and was noted: Patient's note from [facility] immediate care was reviewed and patient had presented after a fall and was little disorientated however pressure was normal.  His temperature was mildly elevated at 99.8 there but only a CT scan of head and neck was ordered.  Patient was discharged    History obtained by an independent source was from: Medics or who gave the original history    Discussions of management was done with: Osmany hospitalist and intensivist were contacted and agree with plan    My independent interpretation of studies of: CTA of head shows no acute stroke.  Old stroke seen, chest x-ray showed no consolidation or effusion      Shared decision making was done by self and patient and sister along with the hospitalist and critical care.  Patient will be admitted for septic workup and IV antibiotics and  fluids        Admission disposition: 1/31/2025  3:19 AM           Medical Decision Making      Disposition and Plan     Clinical Impression:  1. Disorientation    2. Sepsis due to undetermined organism (HCC)    3. Sepsis with hypotension (HCC)    4. Hyponatremia    5. Thrombocytopenia    6. Metabolic acidosis    7. Pyelonephritis         Disposition:  Admit  1/31/2025  3:19 am    Follow-up:  No follow-up provider specified.        Medications Prescribed:  Current Discharge Medication List              Supplementary Documentation:     [facility]   [facility] of [facility]      Sepsis Reassessment Note    BP (!) 83/40   Pulse 79   Temp 98 °F (36.7 °C) (Oral)   Resp 22   Wt 76.2 kg   SpO2 99%   BMI 24.81 kg/m²      I completed the sepsis reassessment at 4:15 AM    Cardiac:  Regularity: Regular  Rate: Normal  Heart Sounds: S1,S2    Lungs:   Right: Diminished  Left: Diminished    Peripheral Pulses:  Radial: Right 1+ or Left 1+      Capillary Refill:  <3 Secs    Skin:  Temp/Moisture: Warm and Dry  Color: Normal      Jose Johnston MD  1/31/2025  3:51 AM             Hospital Problems       Present on Admission             ICD-10-CM Noted POA    * (Principal) Disorientation R41.0 1/31/2025 Unknown         TNK/ NI Documentation:    Date/Time last known well:   1/31/2025    NIHSS on presentation: 1     Chief Complaint   Patient presents with    Stroke     IV Tenecteplase (TNK) administered:  Patient was not having an active stroke    Candidate for Endovascular thrombectomy (EVT): No; Patient is not a candidate for Endovascular Thrombectomy due to: No large vessel occlusion ( LVO)  on CTA/MRA imaging      Disposition: Admit

## 2025-02-01 LAB
ALBUMIN SERPL-MCNC: 3.5 G/DL (ref 3.2–4.8)
ALBUMIN/GLOB SERPL: 1.4 {RATIO} (ref 1–2)
ALP LIVER SERPL-CCNC: 44 U/L
ALT SERPL-CCNC: 33 U/L
ANION GAP SERPL CALC-SCNC: 10 MMOL/L (ref 0–18)
AST SERPL-CCNC: 46 U/L (ref ?–34)
BASOPHILS # BLD AUTO: 0.02 X10(3) UL (ref 0–0.2)
BASOPHILS NFR BLD AUTO: 0.1 %
BILIRUB SERPL-MCNC: 1 MG/DL (ref 0.2–1.1)
BUN BLD-MCNC: 25 MG/DL (ref 9–23)
CALCIUM BLD-MCNC: 9.5 MG/DL (ref 8.7–10.6)
CHLORIDE SERPL-SCNC: 105 MMOL/L (ref 98–112)
CO2 SERPL-SCNC: 22 MMOL/L (ref 21–32)
CREAT BLD-MCNC: 0.76 MG/DL
EGFRCR SERPLBLD CKD-EPI 2021: 87 ML/MIN/1.73M2 (ref 60–?)
EOSINOPHIL # BLD AUTO: 0.01 X10(3) UL (ref 0–0.7)
EOSINOPHIL NFR BLD AUTO: 0.1 %
ERYTHROCYTE [DISTWIDTH] IN BLOOD BY AUTOMATED COUNT: 14.1 %
GLOBULIN PLAS-MCNC: 2.5 G/DL (ref 2–3.5)
GLUCOSE BLD-MCNC: 114 MG/DL (ref 70–99)
HCT VFR BLD AUTO: 33 %
HGB BLD-MCNC: 11.4 G/DL
IMM GRANULOCYTES # BLD AUTO: 0.25 X10(3) UL (ref 0–1)
IMM GRANULOCYTES NFR BLD: 1.4 %
INR BLD: 1.52 (ref 0.8–1.2)
LYMPHOCYTES # BLD AUTO: 0.78 X10(3) UL (ref 1–4)
LYMPHOCYTES NFR BLD AUTO: 4.5 %
MAGNESIUM SERPL-MCNC: 1.9 MG/DL (ref 1.6–2.6)
MCH RBC QN AUTO: 31.1 PG (ref 26–34)
MCHC RBC AUTO-ENTMCNC: 34.5 G/DL (ref 31–37)
MCV RBC AUTO: 89.9 FL
MONOCYTES # BLD AUTO: 3.99 X10(3) UL (ref 0.1–1)
MONOCYTES NFR BLD AUTO: 22.8 %
NEUTROPHILS # BLD AUTO: 12.47 X10 (3) UL (ref 1.5–7.7)
NEUTROPHILS # BLD AUTO: 12.47 X10(3) UL (ref 1.5–7.7)
NEUTROPHILS NFR BLD AUTO: 71.1 %
OSMOLALITY SERPL CALC.SUM OF ELEC: 289 MOSM/KG (ref 275–295)
PHOSPHATE SERPL-MCNC: 1.8 MG/DL (ref 2.4–5.1)
PLATELET # BLD AUTO: 104 10(3)UL (ref 150–450)
PLATELETS.RETICULATED NFR BLD AUTO: 6.1 % (ref 0–7)
POTASSIUM SERPL-SCNC: 4 MMOL/L (ref 3.5–5.1)
PROT SERPL-MCNC: 6 G/DL (ref 5.7–8.2)
PROTHROMBIN TIME: 18.5 SECONDS (ref 11.6–14.8)
RBC # BLD AUTO: 3.67 X10(6)UL
SODIUM SERPL-SCNC: 137 MMOL/L (ref 136–145)
WBC # BLD AUTO: 17.5 X10(3) UL (ref 4–11)

## 2025-02-01 PROCEDURE — 99233 SBSQ HOSP IP/OBS HIGH 50: CPT | Performed by: EMERGENCY MEDICINE

## 2025-02-01 PROCEDURE — 99233 SBSQ HOSP IP/OBS HIGH 50: CPT | Performed by: STUDENT IN AN ORGANIZED HEALTH CARE EDUCATION/TRAINING PROGRAM

## 2025-02-01 RX ORDER — ATORVASTATIN CALCIUM 20 MG/1
20 TABLET, FILM COATED ORAL NIGHTLY
Status: DISCONTINUED | OUTPATIENT
Start: 2025-02-01 | End: 2025-02-14

## 2025-02-01 RX ORDER — METOPROLOL SUCCINATE 25 MG/1
25 TABLET, EXTENDED RELEASE ORAL
Status: DISCONTINUED | OUTPATIENT
Start: 2025-02-01 | End: 2025-02-08

## 2025-02-01 NOTE — PROGRESS NOTES
[facility]   part of [facility]     Hospitalist Progress Note     Jose Alfredo Solis Patient Status:  Inpatient    1940 MRN [id number]   Location [facility] 4SW-A Attending Henna Edge MD   Hosp Day # 1 PCP MIKAYLA BAUGH, MD ROSEANN     Chief Complaint: Weakness     Subjective:     Patient  up in chair, feels better.     Objective:    Review of Systems:   A comprehensive review of systems was completed; pertinent positive and negatives stated in subjective.    Vital signs:  Temp:  [97.8 °F (36.6 °C)-100.1 °F (37.8 °C)] 100.1 °F (37.8 °C)  Pulse:  [] 110  Resp:  [15-25] 22  BP: (113-161)/() 154/92  SpO2:  [87 %-97 %] 91 %  AO: (135-145)/(45-84) 139/48    Physical Exam:    General: No acute distress  Respiratory: No wheezes, no rhonchi  Cardiovascular: S1, S2, regular rate and rhythm  Abdomen: Soft, Non-tender, non-distended, positive bowel sounds  Neuro: No new focal deficits.   Extremities: No edema      Diagnostic Data:    Labs:  Recent Labs   Lab 25  0103 25  0605 25  0339 25  0340   WBC 3.8* 26.0* 19.1* 17.5*  --    HGB 13.4 11.4* 11.4* 11.4*  --    MCV 90.8 91.7 98.1 89.9  --    .0* 125.0* 109.0* 104.0*  --    BAND 8 11  --   --   --    INR 1.87* 1.91*  --   --  1.52*       Recent Labs   Lab 25  0103 25  0605 25  0339   * 157* 102* 114*   BUN 40* 36* 21 25*   CREATSERUM 1.50* 1.17 0.92 0.76   CA 10.0 8.8 9.2 9.5   ALB 4.6 3.5  --  3.5   * 139 135* 137   K 4.4 3.7 4.7 4.0    108 107 105   CO2 19.0* 19.0* 17.0* 22.0   ALKPHO 59 43*  --  44*   AST 32 43*  --  46*   ALT 29 34  --  33   BILT 1.7* 1.4*  --  1.0   TP 7.2 5.5*  --  6.0       Estimated Glomerular Filtration Rate: 87 mL/min/1.73m2 (by CKD-EPI based on SCr of 0.76 mg/dL).    Recent Labs   Lab 25  0103   TROPHS 13       Recent Labs   Lab 25  0103 25  0605 25  0340   PTP 21.7* 22.1* 18.5*   INR 1.87*  1.91* 1.52*                  Microbiology    Hospital Encounter on 01/31/25   1. Blood Culture     Status: Abnormal (Preliminary result)    Collection Time: 01/31/25  3:04 AM    Specimen: Blood,peripheral   Result Value Ref Range    Blood Culture Result Positive N/A    Blood Culture Result Escherichia coli (A) N/A    Blood Smear Positive Blood Culture (A) N/A    Blood Smear Gram Negative Rods (A) N/A   2. Urine Culture, Routine     Status: Abnormal (Preliminary result)    Collection Time: 01/31/25  1:39 AM    Specimen: Urine, clean catch   Result Value Ref Range    Urine Culture >100,000 CFU/ML Escherichia coli (A) N/A         Imaging: Reviewed in Epic.    Medications:    atorvastatin  20 mg Oral Nightly    metoprolol succinate ER  25 mg Oral 2x Daily(Beta Blocker)    cefTRIAXone  2 g Intravenous Q24H    apixaban  5 mg Oral BID    amiodarone  200 mg Oral Daily    pantoprazole  40 mg Oral QAM AC    tamsulosin  0.4 mg Oral Daily       Assessment & Plan:      #Septic shock due to Ecoli UTI and bacteremia- shock resolved, off pressors  Repeat blood Cx 2/1  IV Abx: cefepime  ID on Cs  #Afib, permanent  Resume BB  DOAC, Amio  #h/o CAD s/p CABG  #Hypothyroid  #Hypertension  #HLD      Henna Edge MD    Supplementary Documentation:     Quality:  DVT Mechanical Prophylaxis:   SCDs, Early ambuation  DVT Pharmacologic Prophylaxis   Medication    apixaban (Eliquis) tab 5 mg         DVT Pharmacologic prophylaxis: Aspirin 81 mg      Code Status: Full Code  Garnica: External urinary catheter in place  Garnica Duration (in days):   Central line:    CARMINA:     Discharge is dependent on: clinical   At this point Mr. Solis is expected to be discharge to: tbd     The 21st Century Cures Act makes medical notes like these available to patients in the interest of transparency. Please be advised this is a medical document. Medical documents are intended to carry relevant information, facts as evident, and the clinical opinion of the  practitioner. The medical note is intended as peer to peer communication and may appear blunt or direct. It is written in medical language and may contain abbreviations or verbiage that are unfamiliar.              **Certification      PHYSICIAN Certification of Need for Inpatient Hospitalization - Initial Certification    Patient will require inpatient services that will reasonably be expected to span two midnight's based on the clinical documentation in H+P.   Based on patients current state of illness, I anticipate that, after discharge, patient will require TBD.

## 2025-02-01 NOTE — PHYSICAL THERAPY NOTE
PHYSICAL THERAPY EVALUATION - INPATIENT     Room Number: 466/466-A  Evaluation Date: 2/1/2025  Type of Evaluation: Initial  Physician Order: PT Eval and Treat    Presenting Problem: fall, weakness, confusion  Co-Morbidities : CVA, CAD, CABG, A-fib, HTN, HLD, hypothyroidism  Reason for Therapy: Mobility Dysfunction and Discharge Planning    PHYSICAL THERAPY ASSESSMENT   Patient is a 85 year old male admitted 1/31/2025 for fall, weakness, confusion/AMS.  Prior to admission, patient's baseline is modified independent gait with the cane inside home, modified independent gait with Rollator RW when outside of home.  Patient is currently functioning below baseline with bed mobility, transfers, gait, and stair negotiation.  Patient is requiring contact guard assist as a result of the following impairments: decreased functional strength, decreased endurance/aerobic capacity, impaired standing balance, and decreased muscular endurance.  Physical Therapy will continue to follow for duration of hospitalization.    Patient will benefit from continued skilled PT Services for duration of hospitalization, however, given the patient is functioning near baseline level do not anticipate skilled therapy needs at discharge .    2:26 PM PT orders put in by Dr. Edge-orders and acknowledged, patient was seen for PT Evaluation this morning.     PLAN DURING HOSPITALIZATION  Nursing Mobility Recommendation : 1 Assist  PT Device Recommendation: Rolling walker  PT Treatment Plan: Bed mobility;Patient education;Gait training;Range of motion;Strengthening;Stair training;Transfer training  Rehab Potential : Good  Frequency (Obs): 3-5x/week     CURRENT GOALS    Goal #1 Patient is able to demonstrate supine - sit EOB @ level: modified independent     Goal #2 Patient is able to demonstrate transfers Sit to/from Stand at assistance level:modified independent     Goal #3 Patient is able to ambulate 300 feet with assist device: walker - rolling at  assistance level: modified independent     Goal #4 Patient to be modified independent to ascend/descend 11 stairs step-to pattern with 2 railings   Goal #5    Goal #6    Goal Comments: Goals established on 2025      PHYSICAL THERAPY MEDICAL/SOCIAL HISTORY  History related to current admission: Patient is a 85 year old male admitted on 2025 from home for falls, weakness, confusion, AMS.  Pt diagnosed with septic shock secondary to UTI.    HOME SITUATION  Type of Home: House  Home Layout: Multi-level;Bed/bath upstairs (Half bathroom on the lower level)  Stairs to Enter : 1        Stairs to Bedroom: 11    Railing: Yes    Lives With: Family (Sister)    Drives: No   Patient Regularly Uses: Reading glasses;Hearing aides;Rollator;Cane (Cane inside home, Rollator RW when outside of home)      Prior Level of [place]: Patient reports modified independent gait with the cane inside home, modified independent gait with the Rollator RW when outside of home, modified independent with stair negotiation, independent with ADL/self-care.  Sister does the cooking, laundry and drives patient to appointments.  Patient reported having recent R TAN.     SUBJECTIVE  \"I can get up with you\"    OBJECTIVE  Precautions: Bed/chair alarm  Fall Risk: Standard fall risk    WEIGHT BEARING RESTRICTION     PAIN ASSESSMENT  Ratin          COGNITION  Overall Cognitive Status:  WFL - within functional limits    RANGE OF MOTION AND STRENGTH ASSESSMENT  Upper extremity ROM and strength are within functional limits     Lower extremity ROM is within functional limits     Lower extremity strength is within functional limits     BALANCE  Static Sitting: Good  Dynamic Sitting: Fair +  Static Standing: Fair (with RW)  Dynamic Standing: Fair - (with RW)    ADDITIONAL TESTS                                    ACTIVITY TOLERANCE                         O2 WALK       NEUROLOGICAL FINDINGS                        AM-PAC '6-Clicks' INPATIENT SHORT FORM  - BASIC MOBILITY  How much difficulty does the patient currently have...  Patient Difficulty: Turning over in bed (including adjusting bedclothes, sheets and blankets)?: A Little   Patient Difficulty: Sitting down on and standing up from a chair with arms (e.g., wheelchair, bedside commode, etc.): A Little   Patient Difficulty: Moving from lying on back to sitting on the side of the bed?: A Little   How much help from another person does the patient currently need...   Help from Another: Moving to and from a bed to a chair (including a wheelchair)?: A Little   Help from Another: Need to walk in hospital room?: A Little   Help from Another: Climbing 3-5 steps with a railing?: A Little     AM-PAC Score:  Raw Score: 18   Approx Degree of Impairment: 46.58%   Standardized Score (AM-PAC Scale): 43.63   CMS Modifier (G-Code): CK    FUNCTIONAL ABILITY STATUS  Gait Assessment   Functional Mobility/Gait Assessment  Gait Assistance: Contact guard assist  Distance (ft): 2  Assistive Device: Rolling walker  Pattern:  (Decr wale/step length/heel-toe pattern)    Skilled Therapy Provided     Bed Mobility:  Rolling: SBA-supervision  Supine to sit: SBA-supervision   Sit to supine: NT     Transfer Mobility:  Sit to stand: cga with the RW   Stand to sit: cga  Gait = cga with the RW    Therapist's Comments: RN approved session, patient in bed with HOB elevated sleeping needing minimal cues to wake up.  Patient performed bed mobility to sit at the EOB without (-)major difficulty, (-)dizziness reported.  Patient used the RW to ambulate to the chair.      Exercise/Education Provided:  Discussed role of PT, goals for the session, POC.  Patient was educated on safety during transfers, standing and gait and need to use the RW at this time, educated on LE exs and encouraged to do them as able, educated on being up in the chair as able and call staff for mobility to decrease fall risk.  Patient was also educated on having Rollator RW brakes  on prior to sitting down or getting up from the seat to decrease fall risk as patient reported that he fell backwards after resting on the seat of the Rollator RW when he was outside ambulating; patient verbalized understanding to all education provided    Patient End of Session: Up in chair;Needs met;Call light within reach;RN aware of session/findings;All patient questions and concerns addressed;Hospital anti-slip socks;SCDs in place;Alarm set      Patient Evaluation Complexity Level:  History Moderate - 1 or 2 personal factors and/or co-morbidities   Examination of body systems Moderate - addressing a total of 3 or more elements   Clinical Presentation Low- Stable   Clinical Decision Making Low Complexity       PT Session Time: 30 minutes  Gait Trainin minutes  Therapeutic Activity: 15 minutes  Neuromuscular Re-education:  minutes  Therapeutic Exercise:  minutes

## 2025-02-01 NOTE — PROGRESS NOTES
CRITICAL CARE PROGRESS NOTE    Patient Name: Jose Alfredo Solis  : 1940  MRN: [id number]  Admit Date: 2025  Length of Stay: 1 Days    Subjective/24h events: Patient off all vasopressors actually hypertensive this morning and minimally tachycardic  Patient feels well  Will restart home beta-blocker this morning repeat blood cultures    Assessment and Plan: 85-year-old male admitted with bacteremia E. coli secondary to urinary tract source    Neuro/Psych: Encephalopathy initially weakness now significantly better with treatment PT OT today    Cardiovascular: Septic shock resolved secondary to E. coli bacteremia    History of atrial fibrillation will restart his home metoprolol 25 given his tachycardia as well as hypertension extended release continue amiodarone  Continue factor Xa inhibitor  Restart sotalol next 24 hours    Pulmonary: No signs of respiratory failure    GI: No abdominal pathology    Renal/Metabolic: No signs of kidney injury    Heme: Mild anemia likely dilutional low relative thrombocytopenia continue to monitor in the setting of bacteremia    ID: Severe leukocytosis secondary to bacteremia now downtrending E. coli not ESBL will change from cefepime back to ceftriaxone repeat blood cultures today peripheral sticks    Endocrine: No known history of diabetes mellitus      ICU checklist  Feeding: Regular diet  Analgesia:   Sedation:   Thromboembolism PPX: Factor Xa inhibitor  Stress Ulcer PPX:   Glucose control:   Bowel Regimen:   Lines/drains/tubes:   Deescalation of antibiotics: Cefepime to ceftriaxone  Therapies:PT/OT/ST when appropriate  Transfer to floor later today  Code Status: Full Code          Physical Exam  General: No acute distress  Neuro: AO x3, non focal   Lungs: Clear  Cardio:  RRR  Abdomen: Soft, non tender, non distended  Extremities: Warm, no swelling     Hemodynamics  24h Vitals:  VitalLast Value (24 Hour)24 Hour Range  Temp98 °F (36.7 °C)Temp  Min: 97.8 °F (36.6 °C)  Max:  98.7 °F (37.1 °C)  [id number]  Min: 70  Max: 116  BP (Non-Invasive)(!) 161/68 BP  Min: 113/50  Max: 161/68  BP (A-Line)139/48AO  Min: 135/45  Max: 157/50  CVP  could not be evaluated. This SmartLink does not work with rows of the type:   PI05Tebl  Min: 15  Max: 25  [id number] %SpO2  Min: 90 %  Max: 98 %  O2 Therapy

## 2025-02-01 NOTE — CONSULTS
[facility]   part of [facility] ID CONSULT NOTE    Jose Alfredo Solis Patient Status:  Inpatient    1940 MRN [id number]   Location [facility] 4SW-A Attending Henna Edge MD   Hosp Day # 1 PCP MIKAYLA BAUGH, MD ROSEANN       Reason for Consultation:  E coli bacteremia    Antibiotics: ceftriaxone    ASSESSMENT:    # E coli bacteremia likely  source. CTX-M gene neg  Urine cx with E coli as well  Had some dysuria prior to admission, has external cath    # septic shock 2/2 above resolved. Now off pressors  # CAD s/p CABG  # A fib  # HTN, HLD    PLAN:    -  continue ceftriaxone 2g daily.   -  follow E coli susceptibilities and repeat blood cx results  -  check renal US  -  Follow fever curve, wbc  -  Reviewed labs, micro, imaging reports, available old records    D/w pt, RN    Thank you for allowing us to participate in the care of this patient. Please do not hesitate to call if you have any questions.   We will continue to follow with you and will make further recommendations based on his progress.    History of Present Illness:  Jose Alfredo Solis is a a(n) 85 year old male with PMHx of CAD s/p CABG, A fib, HTN, HLD and hypothyroidism who presented to ED with confusion and AMS. Code stroke was called, CT head was neg for acute process. Was noted to be hypotensive with septic shock requiring pressors.  Admitted to ICU. Now off pressors and mental status has improved. States he was very weak and fell prior to admission. Denies hx of recurrent UTI or kidney stones. No flank pain. No other complaints. Blood cx positive for E coli.     History:  Past Medical History:    High cholesterol    Hypertension    Hypothyroid    Stroke (HCC)     Past Surgical History:   Procedure Laterality Date    Cabg      Other surgical history      prostate surgery     Family History   Problem Relation Age of Onset    Other (Other) Father         leukemia      reports that he quit smoking about 56 years ago.  He has never used smokeless tobacco. He reports current alcohol use. He reports that he does not use drugs.    Allergies:  Allergies[1]    Medications:    Current Facility-Administered Medications:     atorvastatin (Lipitor) tab 20 mg, 20 mg, Oral, Nightly    metoprolol succinate ER (Toprol XL) 24 hr tab 25 mg, 25 mg, Oral, 2x Daily(Beta Blocker)    cefTRIAXone (Rocephin) 2 g in sodium chloride 0.9% 100 mL IVPB-ADDV, 2 g, Intravenous, Q24H    apixaban (Eliquis) tab 5 mg, 5 mg, Oral, BID    acetaminophen (Tylenol Extra Strength) tab 1,000 mg, 1,000 mg, Oral, Q8H PRN    melatonin tab 3 mg, 3 mg, Oral, Nightly PRN    polyethylene glycol (PEG 3350) (Miralax) 17 g oral packet 17 g, 17 g, Oral, Daily PRN    sennosides (Senokot) tab 17.2 mg, 17.2 mg, Oral, Nightly PRN    bisacodyl (Dulcolax) 10 MG rectal suppository 10 mg, 10 mg, Rectal, Daily PRN    ondansetron (Zofran) 4 MG/2ML injection 4 mg, 4 mg, Intravenous, Q6H PRN    metoclopramide (Reglan) 5 mg/mL injection 5 mg, 5 mg, Intravenous, Q8H PRN    benzonatate (Tessalon) cap 200 mg, 200 mg, Oral, TID PRN    guaiFENesin (Robitussin) 100 MG/5 ML oral liquid 200 mg, 200 mg, Oral, Q4H PRN    glycerin-hypromellose- (Artificial Tears) 0.2-0.2-1 % ophthalmic solution 1 drop, 1 drop, Both Eyes, QID PRN    sodium chloride (Saline Mist) 0.65 % nasal solution 1 spray, 1 spray, Each Nare, Q3H PRN    amiodarone (Pacerone) tab 200 mg, 200 mg, Oral, Daily    pantoprazole (Protonix) DR tab 40 mg, 40 mg, Oral, QAM AC    tamsulosin (Flomax) cap 0.4 mg, 0.4 mg, Oral, Daily    Review of Systems:  CONSTITUTIONAL:  weakness and fatigue.  HEENT:  Eyes:  No visual loss, blurred vision, double vision or yellow sclerae. Ears, Nose, Throat:  No hearing loss, sneezing, congestion, runny nose or sore throat.  SKIN:  No rash or itching.  CARDIOVASCULAR:  No chest pain, chest pressure or chest discomfort  RESPIRATORY:  No shortness of breath, cough or sputum.  GASTROINTESTINAL:  No anorexia,  nausea, vomiting or diarrhea. No abdominal pain or blood.  GENITOURINARY:  No Burning on urination.   NEUROLOGICAL:  No headache, dizziness, syncope, paralysis, ataxia, numbness or tingling in the extremities.  MUSCULOSKELETAL:  No muscle, back pain, joint pain or stiffness.    Physical Exam:  Vital signs: Blood pressure 118/66, pulse 99, temperature 100.1 °F (37.8 °C), temperature source Temporal, resp. rate 20, height 5' 8\" (1.727 m), weight 159 lb 9.8 oz (72.4 kg), SpO2 94%.    General: Alert, oriented, NAD  HEENT: Moist mucous membranes. EOMI  Neck: No lymphadenopathy.  Supple.  Cardiovascular: RRR  Respiratory: Clear to auscultation bilaterally.  No wheezes. No rhonchi.  Abdomen: Soft, nontender, distended.   Musculoskeletal: No edema noted  Integument: No lesions. No erythema.    Laboratory Data:  Recent Labs   Lab 01/31/25 0605 01/31/25 1928 02/01/25 0339   RBC 3.63*   < > 3.67*   HGB 11.4*   < > 11.4*   HCT 33.3*   < > 33.0*   MCV 91.7   < > 89.9   MCH 31.4   < > 31.1   MCHC 34.2   < > 34.5   RDW 14.2   < > 14.1   NEPRELIM 20.03*  --  12.47*   WBC 26.0*   < > 17.5*   .0*   < > 104.0*   NEUT 78  --   --    LYMPH 4  --   --    MON 7  --   --    EOS 0  --   --     < > = values in this interval not displayed.     Recent Labs   Lab 01/31/25 0103 01/31/25 0605 01/31/25 1928 02/01/25  0339   * 157* 102* 114*   BUN 40* 36* 21 25*   CREATSERUM 1.50* 1.17 0.92 0.76   CA 10.0 8.8 9.2 9.5   ALB 4.6 3.5  --  3.5   * 139 135* 137   K 4.4 3.7 4.7 4.0    108 107 105   CO2 19.0* 19.0* 17.0* 22.0   ALKPHO 59 43*  --  44*   AST 32 43*  --  46*   ALT 29 34  --  33   BILT 1.7* 1.4*  --  1.0   TP 7.2 5.5*  --  6.0       Microbiology: Reviewed in EMR    Radiology: Reviewed      MD Kika Batista [facility]  [phone]  2/1/2025         [1] No Known Allergies

## 2025-02-02 ENCOUNTER — APPOINTMENT (OUTPATIENT)
Dept: CT IMAGING | Facility: HOSPITAL | Age: 85
End: 2025-02-02
Attending: EMERGENCY MEDICINE
Payer: MEDICARE

## 2025-02-02 ENCOUNTER — APPOINTMENT (OUTPATIENT)
Dept: GENERAL RADIOLOGY | Facility: HOSPITAL | Age: 85
End: 2025-02-02
Attending: EMERGENCY MEDICINE
Payer: MEDICARE

## 2025-02-02 LAB
ALBUMIN SERPL-MCNC: 3.3 G/DL (ref 3.2–4.8)
ALBUMIN/GLOB SERPL: 1.6 {RATIO} (ref 1–2)
ALP LIVER SERPL-CCNC: 41 U/L
ALT SERPL-CCNC: 33 U/L
ANION GAP SERPL CALC-SCNC: 9 MMOL/L (ref 0–18)
AST SERPL-CCNC: 43 U/L (ref ?–34)
BACTERIA BLD CULT: POSITIVE
BACTERIA BLD CULT: POSITIVE
BASOPHILS # BLD AUTO: 0.02 X10(3) UL (ref 0–0.2)
BASOPHILS NFR BLD AUTO: 0.3 %
BILIRUB SERPL-MCNC: 0.8 MG/DL (ref 0.2–1.1)
BLACTX-M ISLT/SPM QL: NOT DETECTED
BUN BLD-MCNC: 22 MG/DL (ref 9–23)
CALCIUM BLD-MCNC: 8.8 MG/DL (ref 8.7–10.6)
CHLORIDE SERPL-SCNC: 103 MMOL/L (ref 98–112)
CO2 SERPL-SCNC: 24 MMOL/L (ref 21–32)
CREAT BLD-MCNC: 0.76 MG/DL
E COLI DNA BLD POS QL NAA+NON-PROBE: DETECTED
EGFRCR SERPLBLD CKD-EPI 2021: 87 ML/MIN/1.73M2 (ref 60–?)
EOSINOPHIL # BLD AUTO: 0.03 X10(3) UL (ref 0–0.7)
EOSINOPHIL NFR BLD AUTO: 0.4 %
ERYTHROCYTE [DISTWIDTH] IN BLOOD BY AUTOMATED COUNT: 14 %
GLOBULIN PLAS-MCNC: 2.1 G/DL (ref 2–3.5)
GLUCOSE BLD-MCNC: 137 MG/DL (ref 70–99)
HCT VFR BLD AUTO: 31.8 %
HGB BLD-MCNC: 10.9 G/DL
IMM GRANULOCYTES # BLD AUTO: 0.18 X10(3) UL (ref 0–1)
IMM GRANULOCYTES NFR BLD: 2.7 %
INR BLD: 1.39 (ref 0.8–1.2)
LYMPHOCYTES # BLD AUTO: 0.59 X10(3) UL (ref 1–4)
LYMPHOCYTES NFR BLD AUTO: 8.7 %
MCH RBC QN AUTO: 30.8 PG (ref 26–34)
MCHC RBC AUTO-ENTMCNC: 34.3 G/DL (ref 31–37)
MCV RBC AUTO: 89.8 FL
MONOCYTES # BLD AUTO: 1.53 X10(3) UL (ref 0.1–1)
MONOCYTES NFR BLD AUTO: 22.6 %
NEUTROPHILS # BLD AUTO: 4.42 X10 (3) UL (ref 1.5–7.7)
NEUTROPHILS # BLD AUTO: 4.42 X10(3) UL (ref 1.5–7.7)
NEUTROPHILS NFR BLD AUTO: 65.3 %
OSMOLALITY SERPL CALC.SUM OF ELEC: 287 MOSM/KG (ref 275–295)
PHOSPHATE SERPL-MCNC: 2.6 MG/DL (ref 2.4–5.1)
PLATELET # BLD AUTO: 95 10(3)UL (ref 150–450)
PLATELETS.RETICULATED NFR BLD AUTO: 5.7 % (ref 0–7)
POTASSIUM SERPL-SCNC: 3.6 MMOL/L (ref 3.5–5.1)
PROT SERPL-MCNC: 5.4 G/DL (ref 5.7–8.2)
PROTHROMBIN TIME: 17.2 SECONDS (ref 11.6–14.8)
RBC # BLD AUTO: 3.54 X10(6)UL
SODIUM SERPL-SCNC: 136 MMOL/L (ref 136–145)
WBC # BLD AUTO: 6.8 X10(3) UL (ref 4–11)

## 2025-02-02 PROCEDURE — 99233 SBSQ HOSP IP/OBS HIGH 50: CPT | Performed by: EMERGENCY MEDICINE

## 2025-02-02 PROCEDURE — 30233J1 TRANSFUSION OF NONAUTOLOGOUS SERUM ALBUMIN INTO PERIPHERAL VEIN, PERCUTANEOUS APPROACH: ICD-10-PCS | Performed by: EMERGENCY MEDICINE

## 2025-02-02 PROCEDURE — 71045 X-RAY EXAM CHEST 1 VIEW: CPT | Performed by: EMERGENCY MEDICINE

## 2025-02-02 PROCEDURE — 74178 CT ABD&PLV WO CNTR FLWD CNTR: CPT | Performed by: EMERGENCY MEDICINE

## 2025-02-02 PROCEDURE — 99232 SBSQ HOSP IP/OBS MODERATE 35: CPT | Performed by: STUDENT IN AN ORGANIZED HEALTH CARE EDUCATION/TRAINING PROGRAM

## 2025-02-02 PROCEDURE — 71260 CT THORAX DX C+: CPT | Performed by: EMERGENCY MEDICINE

## 2025-02-02 RX ORDER — ALBUMIN HUMAN 50 G/1000ML
25 SOLUTION INTRAVENOUS ONCE
Status: COMPLETED | OUTPATIENT
Start: 2025-02-02 | End: 2025-02-02

## 2025-02-02 RX ORDER — DEXMEDETOMIDINE HYDROCHLORIDE 4 UG/ML
INJECTION, SOLUTION INTRAVENOUS CONTINUOUS
Status: DISCONTINUED | OUTPATIENT
Start: 2025-02-02 | End: 2025-02-04

## 2025-02-02 RX ORDER — ASPIRIN 81 MG/1
81 TABLET ORAL DAILY
Status: DISCONTINUED | OUTPATIENT
Start: 2025-02-02 | End: 2025-02-14

## 2025-02-02 RX ORDER — DEXMEDETOMIDINE HYDROCHLORIDE 4 UG/ML
INJECTION, SOLUTION INTRAVENOUS
Status: COMPLETED
Start: 2025-02-02 | End: 2025-02-02

## 2025-02-02 RX ORDER — FUROSEMIDE 10 MG/ML
20 INJECTION INTRAMUSCULAR; INTRAVENOUS ONCE
Status: COMPLETED | OUTPATIENT
Start: 2025-02-02 | End: 2025-02-02

## 2025-02-02 NOTE — PLAN OF CARE
Received at change of shift. A/O x4, follows commands, PERRLA. RA. Tmax 100.4 - PRN Tylenol given. Tele A-fib. BP more in control w/ Toprol PO. Primafit. BM x2. No c/o pain. LUE wound dressing intact. GB/W x2. See flowsheets for further info.

## 2025-02-02 NOTE — SLP NOTE
ADULT SWALLOWING EVALUATION    ASSESSMENT    ASSESSMENT/OVERALL IMPRESSION:  Jose Alfredo, 85-year old male admitted for the following:  Hyponatremia [E87.1]  Metabolic acidosis [E87.20]  Pyelonephritis [N12]  Thrombocytopenia [D69.6]  Disorientation [R41.0]  Sepsis due to undetermined organism (HCC) [A41.9]  Sepsis with hypotension (HCC) [A41.9, I95.9]    RN cleared SLP to see patient, reports not difficulties with breakfast. Upon SLP arrival patient displaying shakes/tremor. Throughout evaluation, O2 ranging between 88-90%. Sister arrived and stated that patient does not typically have shakes/tremors and reports coughing when eating/drinking at home, however, denies hx of PNA.    Patient able to follow commands for oral motor exam, oral motor function appears adequate. Thin liquids via straw provided x3oz. Intermittent delayed coughing with thin liquids. Puree TSP x3 without difficulties. Hard solid x3 provided. Patient demonstrates reduced mastication and bolus formation of solids, requires liquid wash to assist with bolus formation and oral clearance. Nectar thick liquids trialed via cup x3 and TSP x4. No overt s/s of aspiration with nectar thick liquids. Will downgrade patient's diet to soft and bite sized solids with nectar thick liquids. Will follow and determine if VFSS is clinically necessary. CXR results pending. Per RN, patient now is febrile.    Notified RN of patient's tremors, O2, and diet recommendations.    RECOMMENDATIONS   Diet Recommendations - Solids: Mechanical soft chopped/ Soft & Bite Sized  Diet Recommendations - Liquids: Nectar thick liquids/ Mildly thick    Aspiration Precautions: No straw  Medication Administration Recommendations:  (In puree as necessary)  Treatment Plan/Recommendations: Dysphagia therapy    HISTORY   MEDICAL HISTORY  Reason for Referral: R/O aspiration    Problem List  Principal Problem:    Disorientation  Active Problems:    Sepsis due to undetermined organism (HCC)     Sepsis with hypotension (HCC)    Hyponatremia    Thrombocytopenia    Metabolic acidosis    Pyelonephritis      Past Medical History  Past Medical History:    High cholesterol    Hypertension    Hypothyroid    Stroke (HCC)       Prior Living Situation: Home with support  Diet Prior to Admission: Regular;Thin liquids  Precautions: Aspiration    Patient/Family Goals: None stated    SWALLOWING HISTORY  Current Diet Consistency: Regular;Thin liquids    Dysphagia History: None in chart. Sister reports patient coughs when he eats/drinks    Imaging Results:   02/02/2025 Pending CXR results    01/31/2025 CXR:  \"Cardiomediastinal silhouette is prominent in size with sternotomy wires, mediastinal clips, and left-sided pacemaker device.  At least 2 of the sternotomy wires are discontinuous.  Correlate with prior imaging to determine stability.  No focal consolidation, pleural effusion, or pneumothorax.\"    SUBJECTIVE   Patient awake and alert. Increased heart rate and blood pressure. O2 ranging between 88-90% throughout evaluation. Shaking/tremors upon arrival. RN notified. Sister arrived shortly after SLP arrival. Sister reports patient does not typically shake and has had troubles swallowing in the past where he coughs, she denies him having history of PNA.    OBJECTIVE   ORAL MOTOR EXAMINATION  Dentition: Natural  Symmetry: Within Functional Limits  Strength: Within Functional Limits  Tone: Within Functional Limits  Range of Motion: Within Functional Limits  Rate of Motion: Within Functional Limits    Voice Quality: Weak  Respiratory Status: Nasal cannula  Consistencies Trialed: Thin liquids;Nectar thick liquids;Puree;Hard solid  Method of Presentation: Self presentation;Staff/Clinician assistance  Patient Positioned: Upright    Oral Phase of Swallow: Impaired  Bolus Retrieval: Intact  Bilabial Seal: Intact  Bolus Formation: Impaired  Bolus Propulsion: Impaired  Mastication: Impaired  Retention: Impaired    Pharyngeal Phase  of Swallow: Appears Impaired  Laryngeal Elevation Timing: Appears impaired  Laryngeal Elevation Strength: Appears impaired  Laryngeal Elevation Coordination: Appears impaired  (Please note: Silent aspiration cannot be evaluated clinically. Videofluoroscopic Swallow Study is required to rule-out silent aspiration.)    Esophageal Phase of Swallow: No complaints consistent with possible esophageal involvement    GOALS  Goal #1 Patient will tolerate soft and bite sized diet with nectar thick liquids, no straws without overt s/s of aspiration with 90% accuracy  In Progress   Goal #2 Patient will tolerate PO trials of general solids and thin liquids without overt s/s of aspiration with 90% accuracy    In Progress   Goal #3 Patient will participate in VFSS if continued concerns of aspiration are present and VFSS is clinically necessary  In Progress     FOLLOW UP  Treatment Plan/Recommendations: Dysphagia therapy  Duration: 1 week  Follow Up Needed (Documentation Required): Yes  SLP Follow-up Date: 02/03/25    Thank you for your referral.   If you have any questions, please contact Faye Sevilla, SLP

## 2025-02-02 NOTE — PROGRESS NOTES
[facility]   part of [facility]     Hospitalist Progress Note     Jose Alfredo Solis Patient Status:  Inpatient    1940 MRN [id number]   Location [facility] 4SW-A Attending Henna Edge MD   Hosp Day # 2 PCP MIKAYLA BAUGH, MD ROSEANN     Chief Complaint: Weakness     Subjective:     Patient  doing well.    Objective:    Review of Systems:   A comprehensive review of systems was completed; pertinent positive and negatives stated in subjective.    Vital signs:  Temp:  [97.8 °F (36.6 °C)-101.9 °F (38.8 °C)] 101.9 °F (38.8 °C)  Pulse:  [] 147  Resp:  [19-32] 27  BP: ()/() 149/86  SpO2:  [87 %-99 %] 92 %    Physical Exam:    General: No acute distress  Respiratory: No wheezes, no rhonchi  Cardiovascular: S1, S2, regular rate and rhythm  Abdomen: Soft, Non-tender, non-distended, positive bowel sounds  Neuro: No new focal deficits.   Extremities: No edema      Diagnostic Data:    Labs:  Recent Labs   Lab 25  0103 25  0625  0340 25  0420   WBC 3.8* 26.0* 19.1* 17.5*  --  6.8   HGB 13.4 11.4* 11.4* 11.4*  --  10.9*   MCV 90.8 91.7 98.1 89.9  --  89.8   .0* 125.0* 109.0* 104.0*  --  95.0*   BAND 8 11  --   --   --   --    INR 1.87* 1.91*  --   --  1.52* 1.39*       Recent Labs   Lab 25  0625  0420   * 102* 114* 137*   BUN 36* 21 25* 22   CREATSERUM 1.17 0.92 0.76 0.76   CA 8.8 9.2 9.5 8.8   ALB 3.5  --  3.5 3.3    135* 137 136   K 3.7 4.7 4.0 3.6    107 105 103   CO2 19.0* 17.0* 22.0 24.0   ALKPHO 43*  --  44* 41*   AST 43*  --  46* 43*   ALT 34  --  33 33   BILT 1.4*  --  1.0 0.8   TP 5.5*  --  6.0 5.4*       Estimated Glomerular Filtration Rate: 87 mL/min/1.73m2 (by CKD-EPI based on SCr of 0.76 mg/dL).    Recent Labs   Lab 25  0103   TROPHS 13       Recent Labs   Lab 25  0605 25  0340 25  0420   PTP 22.1* 18.5* 17.2*   INR 1.91*  1.52* 1.39*                  Microbiology    Hospital Encounter on 01/31/25   1. Blood Culture     Status: Abnormal    Collection Time: 01/31/25  3:04 AM    Specimen: Blood,peripheral   Result Value Ref Range    Blood Culture Result Positive N/A    Blood Culture Result Escherichia coli (A) N/A    Blood Smear Positive Blood Culture (A) N/A    Blood Smear Gram Negative Rods (A) N/A   2. Urine Culture, Routine     Status: Abnormal    Collection Time: 01/31/25  1:39 AM    Specimen: Urine, clean catch   Result Value Ref Range    Urine Culture >100,000 CFU/ML Escherichia coli (A) N/A       Susceptibility    Escherichia coli -  (no method available)     Ampicillin 4 Sensitive      Cefazolin <=4 Sensitive      Ciprofloxacin <=0.25 Sensitive      Gentamicin <=1 Sensitive      Meropenem <=0.25 Sensitive      Levofloxacin <=0.12 Sensitive      Nitrofurantoin <=16 Sensitive      Piperacillin + Tazobactam <=4 Sensitive      Trimethoprim/Sulfa <=20 Sensitive          Imaging: Reviewed in Epic.    Medications:    [START ON 2/3/2025] ceFAZolin  2 g Intravenous Q8H    aspirin  81 mg Oral Daily    atorvastatin  20 mg Oral Nightly    metoprolol succinate ER  25 mg Oral 2x Daily(Beta Blocker)    apixaban  5 mg Oral BID    amiodarone  200 mg Oral Daily    pantoprazole  40 mg Oral QAM AC    tamsulosin  0.4 mg Oral Daily       Assessment & Plan:      #Septic shock due to Ecoli UTI and bacteremia- shock resolved, off pressors  Repeat blood Cx 2/1  IV Abx: Ancef   ID on Cs  #Afib, permanent  Resume BB  DOAC, Amio  #h/o CAD s/p CABG  #Hypothyroid  #Hypertension  #HLD      Henna Edge MD    Supplementary Documentation:     Quality:  DVT Mechanical Prophylaxis:   SCDs, Early ambuation  DVT Pharmacologic Prophylaxis   Medication    apixaban (Eliquis) tab 5 mg         DVT Pharmacologic prophylaxis: Aspirin 81 mg      Code Status: Full Code  Garnica: External urinary catheter in place  Garnica Duration (in days):   Central line:    CARMINA:      Discharge is dependent on: clinical   At this point Mr. Solis is expected to be discharge to: tbd     The 21st Century Cures Act makes medical notes like these available to patients in the interest of transparency. Please be advised this is a medical document. Medical documents are intended to carry relevant information, facts as evident, and the clinical opinion of the practitioner. The medical note is intended as peer to peer communication and may appear blunt or direct. It is written in medical language and may contain abbreviations or verbiage that are unfamiliar.              **Certification      PHYSICIAN Certification of Need for Inpatient Hospitalization - Initial Certification    Patient will require inpatient services that will reasonably be expected to span two midnight's based on the clinical documentation in H+P.   Based on patients current state of illness, I anticipate that, after discharge, patient will require TBD.

## 2025-02-02 NOTE — PLAN OF CARE
Alert and oriented x 4, on room air this morning, not in respiratory distress.   Able to sit up with assist in chair this morning, encouraged deep breathing and coughing exercises. Noted with low fever this morning, T max is 100.1, gave Acetamenophen PO with good response. Repeat blood cultures x 2 done this morning. Ate breakfast with fair appetite. Noted with on and off cough, encouraged use of IS, able to suction mouth with yankauer, noted with thin secretions when suctioning self, gave Tessalon and Robitussin PO for cough. AFib with 's to 120's this morning,  to 150 systolic, restarted Toprol PO with fair response. Noted with O2 sat 85 to 87% on room air this evening, O2 at 2L/NC started, O2 sat is 94% to 96% when with O2. HR this evening is 140's AFib and  to 180 systolic noted, gave due Toprol PO at 1700H, HR at 1830H is at 120's/min,  systolic.

## 2025-02-02 NOTE — PROGRESS NOTES
CRITICAL CARE PROGRESS NOTE    Patient Name: Jose Alfredo Solis  : 1940  MRN: [id number]  Admit Date: 2025  Length of Stay: 2 Days    Subjective/24h events: Patient still awaiting floor bed ceftriaxone switched to Ancef today infectious disease following    Assessment and Plan: 85-year-old male admitted with bacteremia E. coli secondary to urinary tract source    Neuro/Psych: Encephalopathy initially weakness now significantly better     Cardiovascular: Septic shock resolved secondary to E. coli bacteremia    History of atrial fibrillation home metoprolol 25 given his tachycardia as well as hypertension extended release continue amiodarone  Continue factor Xa inhibitor  Restart home aspirin      Pulmonary: Mild hypoxemic respiratory failure still on 2 L nasal cannula initially felt that this was secondary to his mild sleep symptoms but given his overall volume status we will get an x-ray he may need a diuretic    GI: No abdominal pathology    Renal/Metabolic: No signs of kidney injury    Heme: Mild anemia likely dilutional low relative thrombocytopenia likely consumptive continue to monitor in the setting of bacteremia    ID: Severe leukocytosis now resolved secondary to bacteremia E. coli not ESBL will change from ceftriaxone to Ancef total of 10 days infectious disease following    Endocrine: No known history of diabetes mellitus      ICU checklist  Feeding: Regular diet  Analgesia:   Sedation:   Thromboembolism PPX: Factor Xa inhibitor  Stress Ulcer PPX:   Glucose control:   Bowel Regimen:   Lines/drains/tubes:   Deescalation of antibiotics: Cefepime to ceftriaxone  Therapies:PT/OT/ST when appropriate  Transfer to floor   Code Status: Full Code          Physical Exam  General: No acute distress  Neuro: AO x3, non focal   Lungs: Clear  Cardio:  RRR  Abdomen: Soft, non tender, non distended  Extremities: Warm, no swelling     Hemodynamics  24h Vitals:  VitalLast Value (24 Hour)24 Hour Range  Temp98 °F  (36.7 °C)Temp  Min: 97.8 °F (36.6 °C)  Max: 98.7 °F (37.1 °C)  [id number]  Min: 70  Max: 116  BP (Non-Invasive)(!) 161/68 BP  Min: 113/50  Max: 161/68  BP (A-Line)139/48AO  Min: 135/45  Max: 157/50  CVP  could not be evaluated. This SmartLink does not work with rows of the type:   [id number]  Min: 15  Max: 25  [id number] %SpO2  Min: 90 %  Max: 98 %  O2 Therapy

## 2025-02-03 ENCOUNTER — APPOINTMENT (OUTPATIENT)
Dept: GENERAL RADIOLOGY | Facility: HOSPITAL | Age: 85
End: 2025-02-03
Attending: STUDENT IN AN ORGANIZED HEALTH CARE EDUCATION/TRAINING PROGRAM
Payer: MEDICARE

## 2025-02-03 LAB
ANION GAP SERPL CALC-SCNC: 10 MMOL/L (ref 0–18)
BUN BLD-MCNC: 16 MG/DL (ref 9–23)
CALCIUM BLD-MCNC: 8.8 MG/DL (ref 8.7–10.6)
CHLORIDE SERPL-SCNC: 99 MMOL/L (ref 98–112)
CO2 SERPL-SCNC: 25 MMOL/L (ref 21–32)
CREAT BLD-MCNC: 0.66 MG/DL
EGFRCR SERPLBLD CKD-EPI 2021: 91 ML/MIN/1.73M2 (ref 60–?)
ERYTHROCYTE [DISTWIDTH] IN BLOOD BY AUTOMATED COUNT: 14 %
GLUCOSE BLD-MCNC: 125 MG/DL (ref 70–99)
HCT VFR BLD AUTO: 30.1 %
HGB BLD-MCNC: 10.6 G/DL
MCH RBC QN AUTO: 31.3 PG (ref 26–34)
MCHC RBC AUTO-ENTMCNC: 35.2 G/DL (ref 31–37)
MCV RBC AUTO: 88.8 FL
OSMOLALITY SERPL CALC.SUM OF ELEC: 281 MOSM/KG (ref 275–295)
PLATELET # BLD AUTO: 101 10(3)UL (ref 150–450)
PLATELETS.RETICULATED NFR BLD AUTO: 7.4 % (ref 0–7)
POTASSIUM SERPL-SCNC: 3.1 MMOL/L (ref 3.5–5.1)
RBC # BLD AUTO: 3.39 X10(6)UL
SODIUM SERPL-SCNC: 134 MMOL/L (ref 136–145)
WBC # BLD AUTO: 8.4 X10(3) UL (ref 4–11)

## 2025-02-03 PROCEDURE — 74230 X-RAY XM SWLNG FUNCJ C+: CPT | Performed by: STUDENT IN AN ORGANIZED HEALTH CARE EDUCATION/TRAINING PROGRAM

## 2025-02-03 PROCEDURE — 99233 SBSQ HOSP IP/OBS HIGH 50: CPT | Performed by: EMERGENCY MEDICINE

## 2025-02-03 PROCEDURE — 99232 SBSQ HOSP IP/OBS MODERATE 35: CPT | Performed by: STUDENT IN AN ORGANIZED HEALTH CARE EDUCATION/TRAINING PROGRAM

## 2025-02-03 RX ORDER — METOCLOPRAMIDE HYDROCHLORIDE 5 MG/ML
10 INJECTION INTRAMUSCULAR; INTRAVENOUS EVERY 8 HOURS PRN
Status: DISCONTINUED | OUTPATIENT
Start: 2025-02-03 | End: 2025-02-14

## 2025-02-03 NOTE — PROGRESS NOTES
INFECTIOUS DISEASE  PROGRESS NOTE            [facility]    Jose Alfredo Solis Patient Status:  Inpatient    1940 MRN [id number]   [facility] 4SW-A Attending Henna Edge MD   Hosp Day # 3 PCP MIKAYLA BAUGH, MD ROSEANN     Antibiotics: #3  Cefazolin #0    Subjective:  : resting comfortable    Objective:  Temp:  [97.6 °F (36.4 °C)-101.9 °F (38.8 °C)] 98.6 °F (37 °C)  Pulse:  [] 88  Resp:  [22-34] 29  BP: ()/() 109/40  SpO2:  [87 %-99 %] 92 %    General: awake alert  Vital signs:Temp:  [97.6 °F (36.4 °C)-101.9 °F (38.8 °C)] 98.6 °F (37 °C)  Pulse:  [] 88  Resp:  [22-34] 29  BP: ()/() 109/40  SpO2:  [87 %-99 %] 92 %  HEENT: Moist mucous membranes. Extraocular muscles are intact.  Neck: supple no masses  Respiratory: Non labored, symmetric excursion, normal respirations  Cardiovascular: no irregularities in rhythm  Abdomen: Soft, nontender, nondistended.   Musculoskeletal: joints: no swelling   Integument: No lesions. No erythema. No open wounds.  Labs:     COVID-19 Lab Results    COVID-19  Lab Results   Component Value Date    COVID19 Not Detected 2025       Pro-Calcitonin  Recent Labs   Lab 25  0103   PCT 2.47*       Cardiac  No results for input(s): \"TROP\", \"PBNP\" in the last 168 hours.    Creatinine Kinase  No results for input(s): \"CK\" in the last 168 hours.    Inflammatory Markers  No results for input(s): \"CRP\", \"ARIK\", \"LDH\", \"DDIMER\" in the last 168 hours.    Recent Labs   Lab 25  0605 25  1928 25  0420 25  0334   RBC 3.63*   < > 3.54* 3.39*   HGB 11.4*   < > 10.9* 10.6*   HCT 33.3*   < > 31.8* 30.1*   MCV 91.7   < > 89.8 88.8   MCH 31.4   < > 30.8 31.3   MCHC 34.2   < > 34.3 35.2   RDW 14.2   < > 14.0 14.0   NEPRELIM 20.03*   < > 4.42  --    WBC 26.0*   < > 6.8 8.4   .0*   < > 95.0* 101.0*   NEUT 78  --   --   --    LYMPH 4  --   --   --    MON 7  --   --   --    EOS 0  --   --   --     < > = values  in this interval not displayed.         Recent Labs   Lab 01/31/25  0605 01/31/25  1928 02/01/25  0339 02/02/25  0420 02/03/25  0335   *   < > 114* 137* 125*   BUN 36*   < > 25* 22 16   CREATSERUM 1.17   < > 0.76 0.76 0.66*   CA 8.8   < > 9.5 8.8 8.8   ALB 3.5  --  3.5 3.3  --       < > 137 136 134*   K 3.7   < > 4.0 3.6 3.1*      < > 105 103 99   CO2 19.0*   < > 22.0 24.0 25.0   ALKPHO 43*  --  44* 41*  --    AST 43*  --  46* 43*  --    ALT 34  --  33 33  --    BILT 1.4*  --  1.0 0.8  --    TP 5.5*  --  6.0 5.4*  --     < > = values in this interval not displayed.       No results found for: \"VANCT\"  Microbiology    Hospital Encounter on 01/31/25   1. Blood Culture     Status: None (Preliminary result)    Collection Time: 02/01/25  8:40 AM    Specimen: Blood,peripheral   Result Value Ref Range    Blood Culture Result No Growth 1 Day N/A   2. Urine Culture, Routine     Status: Abnormal    Collection Time: 01/31/25  1:39 AM    Specimen: Urine, clean catch   Result Value Ref Range    Urine Culture >100,000 CFU/ML Escherichia coli (A) N/A       Susceptibility    Escherichia coli -  (no method available)     Ampicillin 4 Sensitive      Cefazolin <=4 Sensitive      Ciprofloxacin <=0.25 Sensitive      Gentamicin <=1 Sensitive      Meropenem <=0.25 Sensitive      Levofloxacin <=0.12 Sensitive      Nitrofurantoin <=16 Sensitive      Piperacillin + Tazobactam <=4 Sensitive      Trimethoprim/Sulfa <=20 Sensitive        Problem list reviewed:  Patient Active Problem List   Diagnosis    Trochanteric bursitis of left hip    Disorientation    Sepsis due to undetermined organism (HCC)    Sepsis with hypotension (HCC)    Hyponatremia    Thrombocytopenia    Metabolic acidosis    Pyelonephritis             ASSESSMENT/PLAN:  1. E coli UTI and bacteremia  -reported slow stream in the past  Currently voiding with external cath    -Check PVR  -CT abd/pelvis stone protocol    Continue Cefazolin    2. Respiratory, cough,  gound glass consolidations on CT      3. Gallstones    4. CAD SP CABG/Afib      Ahsan Norton MD, MD  [facility]  [phone]

## 2025-02-03 NOTE — PAYOR COMM NOTE
--------------  ADMISSION REVIEW     Payor: [employer]  Subscriber #:  [id number]  Authorization Number: [id number]    Admit date: 1/31/25  Admit time:  4:45 AM       REVIEW DOCUMENTATION:     ED Provider Notes        ED Provider Notes signed by Jose Johnston MD at 1/31/2025  4:16 AM          Patient Seen in: [facility]      History     Chief Complaint   Patient presents with    Stroke     Stated Complaint:     Subjective:   HPI      Patient is an 85-year-old male who was brought by ambulance from home after patient's sister states that speech seemed a little garbled.  He had a fall 2 days ago with very similar symptoms and was taken to [facility] and [facility].  However they just had a plain CT and a neck CT.  Tonight he felt very weak he states and fell again.  He is also had a headache and is just not felt well.  Medics state he was moving all his extremities but does have a history of a prior stroke but his speech did seem off to them.  The patient was brought in as a code stroke.      Physical Exam     ED Triage Vitals   BP 01/31/25 0146 118/49   Pulse 01/31/25 0146 73   Resp 01/31/25 0244 20   Temp 01/31/25 0146 98 °F (36.7 °C)   Temp src 01/31/25 0146 Oral   SpO2 01/31/25 0146 92 %   O2 Device 01/31/25 0146 None (Room air)       Current Vitals:   Vital Signs  BP: (!) 83/40  Pulse: 79  Resp: 22  Temp: 98 °F (36.7 °C)  Temp src: Oral    Oxygen Therapy  SpO2: 99 %  O2 Device: None (Room air)        Physical Exam    Vital signs reviewed  General appearance: Patient is alert answers questions but speech does seem off and a little slurred, patient is also hypotensive  HEENT: Pupils equal react to light extraocular muscles intact no scleral icterus, mucous membranes are moist, there is no erythema or exudate in the posterior pharynx  Neck: Supple no JVD no lymphadenopathy no meningismus no carotid bruit  CV: Regular rate and rhythm no murmur rub  Respiratory: Clear to  auscultation bilaterally no crackles no wheezes no accessory muscle use  Abdomen: Soft nontender nondistended, no rebound no guarding  no hepatosplenomegaly bowel sounds are present , no pulsatile mass  Extremities: No clubbing cyanosis or edema 2+ distal pulses.  Neuro: Cranial nerves II through XII intact with no gross focal sensory or motor abnormality.  Patient moving all extremities and follows commands      ED Course     Labs Reviewed   COMP METABOLIC PANEL (14) - Abnormal; Notable for the following components:       Result Value    Glucose 157 (*)     Sodium 135 (*)     CO2 19.0 (*)     BUN 40 (*)     Creatinine 1.50 (*)     eGFR-Cr 45 (*)     Bilirubin, Total 1.7 (*)     All other components within normal limits   CBC WITH DIFFERENTIAL WITH PLATELET - Abnormal; Notable for the following components:    WBC 3.8 (*)     HCT 38.5 (*)     .0 (*)     All other components within normal limits   PROTHROMBIN TIME (PT) - Abnormal; Notable for the following components:    PT 21.7 (*)     INR 1.87 (*)     All other components within normal limits   LACTIC ACID, PLASMA - Abnormal; Notable for the following components:    Lactic Acid 2.5 (*)     All other components within normal limits   URINALYSIS WITH CULTURE REFLEX - Abnormal; Notable for the following components:    Clarity Urine Turbid (*)     Ketones Urine Trace (*)     Blood Urine 3+ (*)     Protein Urine 100 (*)     Nitrite Urine 2+ (*)     Leukocyte Esterase Urine 500 (*)     WBC Urine >50 (*)     RBC Urine >10 (*)     Bacteria Urine 1+ (*)     Squamous Epi. Cells Few (*)     Hyaline Casts Present (*)     WBC Clump Present (*)     All other components within normal limits   MANUAL DIFFERENTIAL - Abnormal; Notable for the following components:    Lymphocyte Absolute Manual 0.80 (*)     Monocyte Absolute Manual 0.08 (*)     Metamyelocyte Absolute Manual 0.04 (*)     All other components within normal limits   POCT GLUCOSE - Abnormal; Notable for the  following components:    POC Glucose 147 (*)     All other components within normal limits   PTT, ACTIVATED - Normal   TROPONIN I HIGH SENSITIVITY - Normal   RAPID SARS-COV-2 BY PCR - Normal   LACTIC ACID REFLEX POST POSTIVE   RAINBOW DRAW LAVENDER   RAINBOW DRAW LIGHT GREEN   RAINBOW DRAW GOLD   RAINBOW DRAW BLUE   BLOOD CULTURE   BLOOD CULTURE   URINE CULTURE, ROUTINE     EKG    Rate, intervals and axes as noted on EKG Report.  Rate: 72  Rhythm: Sinus Rhythm  Reading: Normal sinus rhythm    Patient was evaluated and had a CBC chemistry coags lactic acid troponin COVID flu and blood cultures.  Patient's white count was low at 3.  His lactic acid was 2.5.  His urinalysis showed 3+ blood 2+ nitrates 500 leuks and bacteria.  Based on his initial presentation he also felt warm to touch but temp was only 99.  The fact that he is hypotensive here and lactic acid elevated I am more inclined to think he is septic but I did send him for a CT CTA.    CT HEAD WITHOUT CONTRAST  CTA COW WITH CONTRAST  CTA NECK WITH CONTRAST      COMPARISON: 2/23/2022    IMPRESSION:    No acute intracranial hemorrhage, mass effect, midline shift, or hydrocephalus.    Expected evolution of previously seen right cerebral hemisphere strokes.  No definite new loss of gray-white matter differentiation, although MRI would have higher sensitivity for recent ischemia, with evaluation particularly limited along the right cerebral hemisphere due to old infarcts.  No acute calvarial fracture.    CTA COW:  No significant stenosis or occlusion of the major proximal intracranial arteries.  Fetal origin of the left PCA    CTA NECK:  No significant stenosis or occlusion of the major cervical arteries.    CTA CT did not show anything acute except for previously seen cerebellar hemisphere strokes.  Due to the urinalysis I do feel he is probably got pyelonephritis which is what is making him feel weak and slightly confused.  He was given a septic fluid bolus at  30/kg and still remained hypotensive with a MAP of 52.  Levophed was started.  Patient will be admitted to the ICU.  IV antibiotics Rocephin was given.  Did discuss case with critical care and the hospitalist and they agree with plan    Levophed at 10 mics had a MAP of 65    Patient was seen immediately upon arrival due to a high probability of sudden, clinically significant, or life threatening deterioration of the patient's clinical status.  The patient required my time at the bedside performing direct personal management, the absence of which would likely result in sudden, clinically significant or life threatening deterioration of  clinical condition.   The patient required my constant attention. Time was spent ordering, reviewing, and interpreting ancillary testing and imaging. The patient was frequently reevaluated, and I made frequent checks of  vital signs and monitor. Nursing notes were reviewed.     Critical care time was[120 minutes], and exclusive of procedures.      MDM      Differential diagnosis reflecting the complexity of care include: Stroke, altered mental status, sepsis, infection, pyelonephritis    Comorbidities that add complexity to management include: History of strokes in the past    External chart review was done and was noted: Patient's note from [facility] immediate care was reviewed and patient had presented after a fall and was little disorientated however pressure was normal.  His temperature was mildly elevated at 99.8 there but only a CT scan of head and neck was ordered.  Patient was discharged    History obtained by an independent source was from: Medics or who gave the original history    Discussions of management was done with: Osmany hospitalist and intensivist were contacted and agree with plan    My independent interpretation of studies of: CTA of head shows no acute stroke.  Old stroke seen, chest x-ray showed no consolidation or effusion      Shared decision making was done by  self and patient and sister along with the hospitalist and critical care.  Patient will be admitted for septic workup and IV antibiotics and fluids        Admission disposition: 1/31/2025  3:19 AM           Medical Decision Making      Disposition and Plan     Clinical Impression:  1. Disorientation    2. Sepsis due to undetermined organism (HCC)    3. Sepsis with hypotension (HCC)    4. Hyponatremia    5. Thrombocytopenia    6. Metabolic acidosis    7. Pyelonephritis         Disposition:  Admit  1/31/2025  3:19 am      Supplementary Documentation:     [facility]   part of [facility]      Sepsis Reassessment Note    BP (!) 83/40   Pulse 79   Temp 98 °F (36.7 °C) (Oral)   Resp 22   Wt 76.2 kg   SpO2 99%   BMI 24.81 kg/m²      I completed the sepsis reassessment at 4:15 AM    Cardiac:  Regularity: Regular  Rate: Normal  Heart Sounds: S1,S2    Lungs:   Right: Diminished  Left: Diminished    Peripheral Pulses:  Radial: Right 1+ or Left 1+      Capillary Refill:  <3 Secs    Skin:  Temp/Moisture: Warm and Dry  Color: Normal      Jose Johnston MD  1/31/2025  3:51 AM             Hospital Problems       Present on Admission             ICD-10-CM Noted POA    * (Principal) Disorientation R41.0 1/31/2025 Unknown         TNK/ NI Documentation:    Date/Time last known well:   1/31/2025    NIHSS on presentation: 1     Chief Complaint   Patient presents with    Stroke     IV Tenecteplase (TNK) administered:  Patient was not having an active stroke    Candidate for Endovascular thrombectomy (EVT): No; Patient is not a candidate for Endovascular Thrombectomy due to: No large vessel occlusion ( LVO)  on CTA/MRA imaging      Disposition: Admit        Signed by Jose Johnston MD on 1/31/2025  4:16 AM           History and Physical    H&P signed by Payam Bailey MD at 1/31/2025  6:08 AM             Chief Complaint: weakness     History of Present Illness: Jose Alfredo Solis is a 85 year old male with PMHx CAD s/p  CABG, Afib, HTN, HLD, Hypothyroidism, who presents for weakness.      Patient brought in by ambulance for confusion, change in mentation.  Patient states that he had a fall after he was walking with his walking rollator, was evaluated at [facility] and cleared and discharged back to home.  However sister noted that he was not talking normally.  Initially brought in as a code stroke, CT imaging without intracranial hemorrhage, large vessel occlusion.  On initial evaluation in the ER noted to be quite hypotensive, labs consistent with UTI and admitted to ICU with pressors.  Evaluated patient in the ICU and they will patient much more responsive, back to baseline mentation ANO x 3, answering questions appropriately.  Does note that he has been having dysuria for the past week.  Denies any other fevers, chills, chest pain, shortness of breath, abdominal pain     Diagnostic Data:       Labs:      Recent Labs   Lab 01/31/25  0103   WBC 3.8*   HGB 13.4   MCV 90.8   .0*   BAND 8   INR 1.87*             Recent Labs   Lab 01/31/25 0103   *   BUN 40*   CREATSERUM 1.50*   CA 10.0   ALB 4.6   *   K 4.4      CO2 19.0*   ALKPHO 59   AST 32   ALT 29   BILT 1.7*   TP 7.2         CrCl cannot be calculated (Unknown ideal weight.).         Recent Labs   Lab 01/31/25 0103   PTP 21.7*   INR 1.87*         No results for input(s): \"TROP\", \"CK\" in the last 168 hours.     Imaging: Imaging data reviewed in Epic.  No results found.        ASSESSMENT / PLAN:      # Septic shock 2/2 UTI   - IV abx continued, f/u Bcx, Ucx   - Pressors/stress dose steroids per pulm/critical care   - TSH/AM cortisol    - s/p sepsis bolus in ER      # CAD s/p CABG   # Atrial fibrillation - Amio; A/C with Apixaban  # Hypertension - Hold home oral antihypertensives  # Hypothyroidism - continue home levothyroxine   # Hyperlipidemia - Hold home statin        Code Status: Not on file     Plan of care discussed with patient, ED physician     Payam  Tor Bailey MD  1/31/2025 1/31          Date of Service: 1/31/2025  8:48 AM       Patient doing well this morning.  Says he feels much better than he did earlier his lactate is cleared he did have leukopenia now he has marked leukocytosis with a concerning admission story for urinary sepsis.     His labs are demonstrating a sodium of 139 creatinine of 1.1 a bicarb of 19 and AST of 43 with an ALT of 34 and INR of 1.9 white blood cell count of 26,000 hemoglobin 11 and platelet count 225,000.  His lactate as I mentioned had cleared.  His blood glucose was up.     The patient's urine did have significant abnormalities including white leuks nitrites and bacteria concerning for acute infectious process.  He did not undergo a CT scan to evaluate for possible obstructing stone it does not look like she has had CT scans in the past of the belly that would demonstrate us if there is hydro given that he is improving going to avoid a CT scan but I am going to get a Doppler of the kidneys to rule out hydronephrosis.  If there is any hydronephrosis we would go ahead and get a CT scan to further evaluate.     The patient did get a total of 4.3 L of fluid overnight and does have some urine output today.     In summary this is an 85-year-old male that presents to us with weakness fatigue altered mental status concerning for septic shock     Problems  Leukopenia  Urinary sepsis  Septic shock  Hypothyroidism  Atrial fibrillation  Hypotension  Chronic history of hypertension on beta-blocker including sotalol and hydrochlorothiazide and losartan  Toxic metabolic encephalopathy     Plan  Neuro  Patient was awake and alert and oriented when I saw him  He was following all commands     Cardiovascular  Septic shock  Remains on levo and norepinephrine doses are downtrending  Have not added stress dose steroids at this point for able to come down on the dose we may withhold this from him which may worsen some confusion or  delirium in the ICU     Hypertension  Holding all his home meds including his ARB which can worsen kidney injury enalapril for now     A-fib  Holding his sotalol and his metoprolol given the renal dysfunction and the fact that the patient is relatively bradycardic probably due to some viviana blockade at this time     We will keep his amiodarone 200 mg daily and he is being maintained on Eliquis though I may need to hold his dose depending on what the ultrasound of the kidney shows if the patient is not improving and has hydronephrosis anticoagulation may preclude a procedure so I would consider holding his Eliquis until tomorrow 6 AM     Respiratory  Is he has no active respiratory issues breathing comfortably at this time     Renal  His creatinine remains stable around 1.17 he is urinating we will continue to follow     Infectious disease  He remains on cefepime we will follow-up results of urinalysis and blood cultures anticipate him to have gram-negative sepsis likely in both his urine and his blood given the degree of contamination in his UA and his leukopenia and rigors at home.     Endo  I do not think he will need stress dose steroids  He is not a diabetic  Will put him on an insulin sliding scale     Tubes lines and drains  He has a triple-lumen and art line for the time being     He is on 2 pressors which are improving markedly and anticipate tomorrow will be able to get both of these lines out     Disposition medical ICU     Critical care attending receiving vasoactive medications     Date of service was January 31, 2025     Upon my evaluation, this patient had a high probability of imminent or life-threatening deterioration due to hypoxemia, shock, and respiratory failure, which required my direct attention, intervention, and personal management.     I have personally provided 39 minutes of critical care time, exclusive of time spent on separately billable procedures.  Time includes review of all pertinent  laboratory/radiology results, discussion with consultants, and monitoring for potential decompensation.  Performed interventions included fluids, pressor drugs, oxygen, and vasoactive medications.            2/1          Date of Service: 2/1/2025 11:10 AM     Signed               Chief Complaint: Weakness      Subjective:      Patient  up in chair, feels better.      Objective:    Review of Systems:   A comprehensive review of systems was completed; pertinent positive and negatives stated in subjective.     Vital signs:  Temp:  [97.8 °F (36.6 °C)-100.1 °F (37.8 °C)] 100.1 °F (37.8 °C)  Pulse:  [] 110  Resp:  [15-25] 22  BP: (113-161)/() 154/92  SpO2:  [87 %-97 %] 91 %  AO: (135-145)/(45-84) 139/48     Physical Exam:    General: No acute distress  Respiratory: No wheezes, no rhonchi  Cardiovascular: S1, S2, regular rate and rhythm  Abdomen: Soft, Non-tender, non-distended, positive bowel sounds  Neuro: No new focal deficits.   Extremities: No edema        Diagnostic Data:    Labs:          Recent Labs   Lab 01/31/25  0103 01/31/25  0605 01/31/25 1928 02/01/25 0339 02/01/25  0340   WBC 3.8* 26.0* 19.1* 17.5*  --    HGB 13.4 11.4* 11.4* 11.4*  --    MCV 90.8 91.7 98.1 89.9  --    .0* 125.0* 109.0* 104.0*  --    BAND 8 11  --   --   --    INR 1.87* 1.91*  --   --  1.52*                Recent Labs   Lab 01/31/25  0103 01/31/25  0605 01/31/25 1928 02/01/25  0339   * 157* 102* 114*   BUN 40* 36* 21 25*   CREATSERUM 1.50* 1.17 0.92 0.76   CA 10.0 8.8 9.2 9.5   ALB 4.6 3.5  --  3.5   * 139 135* 137   K 4.4 3.7 4.7 4.0    108 107 105   CO2 19.0* 19.0* 17.0* 22.0   ALKPHO 59 43*  --  44*   AST 32 43*  --  46*   ALT 29 34  --  33   BILT 1.7* 1.4*  --  1.0   TP 7.2 5.5*  --  6.0             Recent Labs   Lab 01/31/25  0103   TROPHS 13               Recent Labs   Lab 01/31/25  0103 01/31/25  0605 02/01/25  0340   PTP 21.7* 22.1* 18.5*   INR 1.87* 1.91* 1.52*          Microbiology            Hospital Encounter on 01/31/25   1. Blood Culture     Status: Abnormal (Preliminary result)     Collection Time: 01/31/25  3:04 AM     Specimen: Blood,peripheral   Result Value Ref Range     Blood Culture Result Positive N/A     Blood Culture Result Escherichia coli (A) N/A     Blood Smear Positive Blood Culture (A) N/A     Blood Smear Gram Negative Rods (A) N/A   2. Urine Culture, Routine     Status: Abnormal (Preliminary result)     Collection Time: 01/31/25  1:39 AM     Specimen: Urine, clean catch   Result Value Ref Range     Urine Culture >100,000 CFU/ML Escherichia coli (A) N/A            Imaging: Reviewed in Epic.     Medications:    atorvastatin  20 mg Oral Nightly    metoprolol succinate ER  25 mg Oral 2x Daily(Beta Blocker)    cefTRIAXone  2 g Intravenous Q24H    apixaban  5 mg Oral BID    amiodarone  200 mg Oral Daily    pantoprazole  40 mg Oral QAM AC    tamsulosin  0.4 mg Oral Daily         Assessment & Plan:       #Septic shock due to Ecoli UTI and bacteremia- shock resolved, off pressors  Repeat blood Cx 2/1  IV Abx: cefepime  ID on Cs  #Afib, permanent  Resume BB  DOAC, Amio  #h/o CAD s/p CABG  #Hypothyroid  #Hypertension  #HLD        Henna Edge MD               **Certification        PHYSICIAN Certification of Need for Inpatient Hospitalization - Initial Certification     Patient will require inpatient services that will reasonably be expected to span two midnight's based on the clinical documentation in H+P.   Based on patients current state of illness, I anticipate that, after discharge, patient will require TBD.                      2/2       Date of Service: 2/2/2025 12:50 PM     Signed          Chief Complaint: Weakness      Subjective:      Patient  doing well.     Objective:    Review of Systems:   A comprehensive review of systems was completed; pertinent positive and negatives stated in subjective.     Vital signs:  Temp:  [97.8 °F (36.6 °C)-101.9 °F (38.8 °C)] 101.9 °F (38.8  °C)  Pulse:  [] 147  Resp:  [19-32] 27  BP: ()/() 149/86  SpO2:  [87 %-99 %] 92 %     Physical Exam:    General: No acute distress  Respiratory: No wheezes, no rhonchi  Cardiovascular: S1, S2, regular rate and rhythm  Abdomen: Soft, Non-tender, non-distended, positive bowel sounds  Neuro: No new focal deficits.   Extremities: No edema        Diagnostic Data:    Labs:           Recent Labs   Lab 01/31/25 0103 01/31/25 0605 01/31/25 1928 02/01/25 0339 02/01/25 0340 02/02/25  0420   WBC 3.8* 26.0* 19.1* 17.5*  --  6.8   HGB 13.4 11.4* 11.4* 11.4*  --  10.9*   MCV 90.8 91.7 98.1 89.9  --  89.8   .0* 125.0* 109.0* 104.0*  --  95.0*   BAND 8 11  --   --   --   --    INR 1.87* 1.91*  --   --  1.52* 1.39*                Recent Labs   Lab 01/31/25 0605 01/31/25 1928 02/01/25 0339 02/02/25  0420   * 102* 114* 137*   BUN 36* 21 25* 22   CREATSERUM 1.17 0.92 0.76 0.76   CA 8.8 9.2 9.5 8.8   ALB 3.5  --  3.5 3.3    135* 137 136   K 3.7 4.7 4.0 3.6    107 105 103   CO2 19.0* 17.0* 22.0 24.0   ALKPHO 43*  --  44* 41*   AST 43*  --  46* 43*   ALT 34  --  33 33   BILT 1.4*  --  1.0 0.8   TP 5.5*  --  6.0 5.4*             Recent Labs   Lab 01/31/25 0103   TROPHS 13               Recent Labs   Lab 01/31/25 0605 02/01/25 0340 02/02/25  0420   PTP 22.1* 18.5* 17.2*   INR 1.91* 1.52* 1.39*                     Microbiology             Hospital Encounter on 01/31/25   1. Blood Culture     Status: Abnormal     Collection Time: 01/31/25  3:04 AM     Specimen: Blood,peripheral   Result Value Ref Range     Blood Culture Result Positive N/A     Blood Culture Result Escherichia coli (A) N/A     Blood Smear Positive Blood Culture (A) N/A     Blood Smear Gram Negative Rods (A) N/A   2. Urine Culture, Routine     Status: Abnormal     Collection Time: 01/31/25  1:39 AM     Specimen: Urine, clean catch   Result Value Ref Range     Urine Culture >100,000 CFU/ML Escherichia coli (A) N/A        Susceptibility     Escherichia coli -  (no method available)       Ampicillin 4 Sensitive         Cefazolin <=4 Sensitive         Ciprofloxacin <=0.25 Sensitive         Gentamicin <=1 Sensitive         Meropenem <=0.25 Sensitive         Levofloxacin <=0.12 Sensitive         Nitrofurantoin <=16 Sensitive         Piperacillin + Tazobactam <=4 Sensitive         Trimethoprim/Sulfa <=20 Sensitive              Imaging: Reviewed in Epic.     Medications:    [START ON 2/3/2025] ceFAZolin  2 g Intravenous Q8H    aspirin  81 mg Oral Daily    atorvastatin  20 mg Oral Nightly    metoprolol succinate ER  25 mg Oral 2x Daily(Beta Blocker)    apixaban  5 mg Oral BID    amiodarone  200 mg Oral Daily    pantoprazole  40 mg Oral QAM AC    tamsulosin  0.4 mg Oral Daily         Assessment & Plan:       #Septic shock due to Ecoli UTI and bacteremia- shock resolved, off pressors  Repeat blood Cx 2/1  IV Abx: Ancef   ID on Cs  #Afib, permanent  Resume BB  DOAC, Amio  #h/o CAD s/p CABG  #Hypothyroid  #Hypertension  #HLD        Henna Edge MD               **Certification        PHYSICIAN Certification of Need for Inpatient Hospitalization - Initial Certification     Patient will require inpatient services that will reasonably be expected to span two midnight's based on the clinical documentation in H+P.   Based on patients current state of illness, I anticipate that, after discharge, patient will require TBD.                      2/3          Date of Service: 2/3/2025 11:04 AM     Signed               Chief Complaint: Weakness      Subjective:      Patient  doing well. Sister Moon at bedside.     Objective:    Review of Systems:   A comprehensive review of systems was completed; pertinent positive and negatives stated in subjective.     Vital signs:  Temp:  [97.6 °F (36.4 °C)-101.9 °F (38.8 °C)] 98.6 °F (37 °C)  Pulse:  [] 91  Resp:  [22-34] 32  BP: ()/() 117/75  SpO2:  [87 %-99 %] 93 %     Physical Exam:    General: No  acute distress  Respiratory: No wheezes, no rhonchi  Cardiovascular: S1, S2, regular rate and rhythm  Abdomen: Soft, Non-tender, non-distended, positive bowel sounds  Neuro: No new focal deficits.   Extremities: No edema        Diagnostic Data:    Labs:            Recent Labs   Lab 01/31/25 0103 01/31/25 0605 01/31/25 1928 02/01/25 0339 02/01/25 0340 02/02/25 0420 02/03/25  0334   WBC 3.8* 26.0* 19.1* 17.5*  --  6.8 8.4   HGB 13.4 11.4* 11.4* 11.4*  --  10.9* 10.6*   MCV 90.8 91.7 98.1 89.9  --  89.8 88.8   .0* 125.0* 109.0* 104.0*  --  95.0* 101.0*   BAND 8 11  --   --   --   --   --    INR 1.87* 1.91*  --   --  1.52* 1.39*  --                  Recent Labs   Lab 01/31/25 0605 01/31/25 1928 02/01/25 0339 02/02/25 0420 02/03/25  0335   *   < > 114* 137* 125*   BUN 36*   < > 25* 22 16   CREATSERUM 1.17   < > 0.76 0.76 0.66*   CA 8.8   < > 9.5 8.8 8.8   ALB 3.5  --  3.5 3.3  --       < > 137 136 134*   K 3.7   < > 4.0 3.6 3.1*      < > 105 103 99   CO2 19.0*   < > 22.0 24.0 25.0   ALKPHO 43*  --  44* 41*  --    AST 43*  --  46* 43*  --    ALT 34  --  33 33  --    BILT 1.4*  --  1.0 0.8  --    TP 5.5*  --  6.0 5.4*  --     < > = values in this interval not displayed.           Recent Labs   Lab 01/31/25 0103   TROPHS 13               Recent Labs   Lab 01/31/25  0605 02/01/25  0340 02/02/25  0420   PTP 22.1* 18.5* 17.2*   INR 1.91* 1.52* 1.39*                     Microbiology             Hospital Encounter on 01/31/25   1. Blood Culture     Status: None (Preliminary result)     Collection Time: 02/01/25  8:40 AM     Specimen: Blood,peripheral   Result Value Ref Range     Blood Culture Result No Growth 1 Day N/A   2. Urine Culture, Routine     Status: Abnormal     Collection Time: 01/31/25  1:39 AM     Specimen: Urine, clean catch   Result Value Ref Range     Urine Culture >100,000 CFU/ML Escherichia coli (A) N/A       Susceptibility     Escherichia coli -  (no method available)        Ampicillin 4 Sensitive         Cefazolin <=4 Sensitive         Ciprofloxacin <=0.25 Sensitive         Gentamicin <=1 Sensitive         Meropenem <=0.25 Sensitive         Levofloxacin <=0.12 Sensitive         Nitrofurantoin <=16 Sensitive         Piperacillin + Tazobactam <=4 Sensitive         Trimethoprim/Sulfa <=20 Sensitive              Imaging: Reviewed in Epic.     Medications:    ceFAZolin  2 g Intravenous Q8H    aspirin  81 mg Oral Daily    atorvastatin  20 mg Oral Nightly    metoprolol succinate ER  25 mg Oral 2x Daily(Beta Blocker)    apixaban  5 mg Oral BID    amiodarone  200 mg Oral Daily    pantoprazole  40 mg Oral QAM AC    tamsulosin  0.4 mg Oral Daily         Assessment & Plan:       #Septic shock due to Ecoli UTI and bacteremia- shock resolved, off pressors  Repeat blood Cx 2/1 NGTD   IV Abx: Ancef   ID on Cs- plan for CT  #Afib, permanent  BB  DOAC, Amio  #h/o CAD s/p CABG  #Hypothyroid  #Hypertension  #HLD        Henna Edge MD   **Certification        PHYSICIAN Certification of Need for Inpatient Hospitalization - Initial Certification     Patient will require inpatient services that will reasonably be expected to span two midnight's based on the clinical documentation in H+P.   Based on patients current state of illness, I anticipate that, after discharge, patient will require TBD.                        MEDICATIONS ADMINISTERED IN LAST 1 DAY:  albumin human (Albumin) 5% injection 25 g       Date Action Dose Route User    2/2/2025 1831 New Bag 25 g Intravenous Antonietta Justin RN          amiodarone (Pacerone) tab 200 mg       Date Action Dose Route User    2/3/2025 0519 Given 200 mg Oral Eduardo Saucedo, JOEY          apixaban (Eliquis) tab 5 mg       Date Action Dose Route User    2/3/2025 0849 Given 5 mg Oral Funmi Mcelroy RN    2/2/2025 2018 Given 5 mg Oral Eduardo Saucedo, JOEY          aspirin DR tab 81 mg       Date Action Dose Route User    2/3/2025 0849 Given 81 mg Oral Lilibeth  JOEY Choudhary          atorvastatin (Lipitor) tab 20 mg       Date Action Dose Route User    2/2/2025 2017 Given 20 mg Oral Eduardo Saucedo RN          ceFAZolin (Ancef) 2g in 10mL IV syringe premix       Date Action Dose Route User    2/3/2025 0521 New Bag 2 g Intravenous Eduardo Saucedo RN          furosemide (Lasix) 10 mg/mL injection 20 mg       Date Action Dose Route User    2/2/2025 1520 Given 20 mg Intravenous Antonietta Justin RN          guaiFENesin (Robitussin) 100 MG/5 ML oral liquid 200 mg       Date Action Dose Route User    2/2/2025 2227 Given 200 mg Oral Eduardo Saucedo RN    2/2/2025 1643 Given 200 mg Oral Antonietta Justin RN          metoprolol succinate ER (Toprol XL) 24 hr tab 25 mg       Date Action Dose Route User    2/3/2025 0519 Given 25 mg Oral Eduardo Saucedo RN    2/2/2025 2018 Given 25 mg Oral Eduardo Saucedo RN          pantoprazole (Protonix) DR tab 40 mg       Date Action Dose Route User    2/3/2025 0519 Given 40 mg Oral Eduardo Saucedo RN          potassium chloride 40 mEq in 250mL sodium chloride 0.9% IVPB premix       Date Action Dose Route User    2/3/2025 0531 New Bag 40 mEq Intravenous Eduardo Saucedo RN          tamsulosin (Flomax) cap 0.4 mg       Date Action Dose Route User    2/3/2025 0849 Given 0.4 mg Oral Funmi Mcelroy RN            Vitals (last day)       Date/Time Temp Pulse Resp BP SpO2 Weight O2 Device O2 Flow Rate (L/min) [facility]    02/03/25 1405 -- -- -- -- 93 % -- Nasal cannula 2 L/min AD    02/03/25 1300 -- 83 32 -- 89 % -- -- -- LD    02/03/25 1230 -- 92 36 -- -- -- -- -- MM    02/03/25 1200 -- 82 -- 122/57 90 % -- Nasal cannula 2 L/min LD    02/03/25 1100 -- 74 23 108/49 91 % -- -- -- LD    02/03/25 1000 -- 91 32 117/75 93 % -- None (Room air) -- LD    02/03/25 0900 -- 88 29 109/40 92 % -- -- --     02/03/25 0800 98.6 °F (37 °C) 78 28 117/58 92 % -- Nasal cannula 2 L/min     02/03/25 0600 -- 78 26 119/58 92 % -- -- --     02/03/25 0528 -- 98 34 -- 96 % -- Nasal  cannula 2 L/min     02/03/25 0505 -- 84 26 -- 91 % -- -- --     02/03/25 0500 -- 84 23 134/56 89 % -- -- --     02/03/25 0400 97.9 °F (36.6 °C) 78 27 121/56 91 % -- Nasal cannula 4 L/min     02/03/25 0300 -- 87 27 129/57 92 % -- -- --     02/03/25 0200 -- 79 25 128/65 94 % 164 lb 0.4 oz (74.4 kg) -- --     02/03/25 0100 -- 83 30 134/57 93 % -- -- --     02/03/25 0000 98.6 °F (37 °C) 91 27 133/60 92 % -- Nasal cannula 4 L/min     02/02/25 2300 -- 92 23 143/59 91 % -- -- --     02/02/25 2245 -- 95 30 -- 92 % -- -- --     02/02/25 2225 -- 104 26 -- 90 % -- Nasal cannula 4 L/min     02/02/25 2220 -- 112 29 -- 87 % -- Nasal cannula 3 L/min     02/02/25 2215 -- 116 24 -- 88 % -- Nasal cannula 2 L/min     02/02/25 2200 -- 105 32 137/55 91 % -- -- --     02/02/25 2100 -- 112 27 117/62 94 % -- -- --     02/02/25 2015 -- 99 29 122/62 94 % -- -- --     02/02/25 2000 98.5 °F (36.9 °C) 101 33 98/54 95 % -- None (Room air) --     02/02/25 1900 -- 96 27 114/72 94 % -- -- --     02/02/25 1800 -- 80 22 88/43 99 % -- None (Room air) --     02/02/25 1700 -- 94 27 108/46 96 % -- -- --     02/02/25 1600 98.7 °F (37.1 °C) 79 22 100/56 97 % -- Nasal cannula 2 L/min KD    02/02/25 1522 97.6 °F (36.4 °C) 91 25 112/50 98 % -- -- -- KD    02/02/25 1500 -- 87 22 102/48 97 % -- -- -- KD    02/02/25 1422 98.4 °F (36.9 °C) 105 31 88/54 98 % -- -- 4 L/min KD    02/02/25 1400 -- 105 25 91/44 98 % -- -- -- KD    02/02/25 1325 101.2 °F (38.4 °C) -- -- -- -- -- -- 6 L/min KD    02/02/25 1300 -- 135 31 174/101 95 % -- -- -- KD    02/02/25 1200 -- 137 28 164/75 94 % -- -- -- KD    02/02/25 1130 101.9 °F (38.8 °C) 147 27 149/86 92 % -- Nasal cannula 4 L/min     02/02/25 1100 -- 130 32 178/95 89 % -- -- -- KD    02/02/25 1000 -- 90 28 148/63 94 % -- -- -- KD    02/02/25 0900 -- 96 31 116/58 93 % -- -- -- KD    02/02/25 0800 99.4 °F (37.4 °C) 97 26 138/61 94 % -- Nasal cannula 2 L/min KD    02/02/25 0700 -- 98 24  156/80 94 % -- -- --     02/02/25 0600 -- 91 20 139/79 94 % -- -- --     02/02/25 0500 -- 79 22 139/64 94 % -- -- --     02/02/25 0400 98.4 °F (36.9 °C) 83 24 104/51 93 % -- Nasal cannula 2 L/min     02/02/25 0200 -- 86 20 118/51 93 % -- -- --     02/02/25 0100 -- 81 19 98/52 88 % -- Nasal cannula 2 L/min     02/02/25 0004 98.7 °F (37.1 °C) 90 22 93/47 95 % 167 lb 5.3 oz (75.9 kg) None (Room air) --

## 2025-02-03 NOTE — CM/SW NOTE
CM notified of [facility] at [facility]. Current anticipated therapy need is for gradual rehab. Request sent to [facility] for review. Referrals for potential [facility] sent via [facility]. PASRR screen completed and attached to referral.     CM will follow up w/ pt/ family to further discuss and provide options for rehab.       ALMA DELIA Sutton, CMSRN    [id number]

## 2025-02-03 NOTE — PROGRESS NOTES
[facility]   part of [facility]     Hospitalist Progress Note     Jose Alfredo Solis Patient Status:  Inpatient    1940 MRN [id number]   Location [facility] 4SW-A Attending Henna Edge MD   Hosp Day # 3 PCP MIKAYLA BAUGH, MD ROSEANN     Chief Complaint: Weakness     Subjective:     Patient  doing well. Sister Moon at bedside.    Objective:    Review of Systems:   A comprehensive review of systems was completed; pertinent positive and negatives stated in subjective.    Vital signs:  Temp:  [97.6 °F (36.4 °C)-101.9 °F (38.8 °C)] 98.6 °F (37 °C)  Pulse:  [] 91  Resp:  [22-34] 32  BP: ()/() 117/75  SpO2:  [87 %-99 %] 93 %    Physical Exam:    General: No acute distress  Respiratory: No wheezes, no rhonchi  Cardiovascular: S1, S2, regular rate and rhythm  Abdomen: Soft, Non-tender, non-distended, positive bowel sounds  Neuro: No new focal deficits.   Extremities: No edema      Diagnostic Data:    Labs:  Recent Labs   Lab 25  0103 25  0605 25  033   WBC 3.8* 26.0* 19.1* 17.5*  --  6.8 8.4   HGB 13.4 11.4* 11.4* 11.4*  --  10.9* 10.6*   MCV 90.8 91.7 98.1 89.9  --  89.8 88.8   .0* 125.0* 109.0* 104.0*  --  95.0* 101.0*   BAND 8 11  --   --   --   --   --    INR 1.87* 1.91*  --   --  1.52* 1.39*  --        Recent Labs   Lab 25  0605 25  033   *   < > 114* 137* 125*   BUN 36*   < > 25* 22 16   CREATSERUM 1.17   < > 0.76 0.76 0.66*   CA 8.8   < > 9.5 8.8 8.8   ALB 3.5  --  3.5 3.3  --       < > 137 136 134*   K 3.7   < > 4.0 3.6 3.1*      < > 105 103 99   CO2 19.0*   < > 22.0 24.0 25.0   ALKPHO 43*  --  44* 41*  --    AST 43*  --  46* 43*  --    ALT 34  --  33 33  --    BILT 1.4*  --  1.0 0.8  --    TP 5.5*  --  6.0 5.4*  --     < > = values in this interval not displayed.       Estimated Glomerular Filtration Rate: 90.8  mL/min/1.73m2 (A) (by CKD-EPI based on SCr of 0.66 mg/dL (L)).    Recent Labs   Lab 01/31/25  0103   TROPHS 13       Recent Labs   Lab 01/31/25  0605 02/01/25  0340 02/02/25  0420   PTP 22.1* 18.5* 17.2*   INR 1.91* 1.52* 1.39*                  Microbiology    Hospital Encounter on 01/31/25   1. Blood Culture     Status: None (Preliminary result)    Collection Time: 02/01/25  8:40 AM    Specimen: Blood,peripheral   Result Value Ref Range    Blood Culture Result No Growth 1 Day N/A   2. Urine Culture, Routine     Status: Abnormal    Collection Time: 01/31/25  1:39 AM    Specimen: Urine, clean catch   Result Value Ref Range    Urine Culture >100,000 CFU/ML Escherichia coli (A) N/A       Susceptibility    Escherichia coli -  (no method available)     Ampicillin 4 Sensitive      Cefazolin <=4 Sensitive      Ciprofloxacin <=0.25 Sensitive      Gentamicin <=1 Sensitive      Meropenem <=0.25 Sensitive      Levofloxacin <=0.12 Sensitive      Nitrofurantoin <=16 Sensitive      Piperacillin + Tazobactam <=4 Sensitive      Trimethoprim/Sulfa <=20 Sensitive          Imaging: Reviewed in Epic.    Medications:    ceFAZolin  2 g Intravenous Q8H    aspirin  81 mg Oral Daily    atorvastatin  20 mg Oral Nightly    metoprolol succinate ER  25 mg Oral 2x Daily(Beta Blocker)    apixaban  5 mg Oral BID    amiodarone  200 mg Oral Daily    pantoprazole  40 mg Oral QAM AC    tamsulosin  0.4 mg Oral Daily       Assessment & Plan:      #Septic shock due to Ecoli UTI and bacteremia- shock resolved, off pressors  Repeat blood Cx 2/1 NGTD   IV Abx: Ancef   ID on Cs- plan for CT  #Afib, permanent  BB  DOAC, Amio  #h/o CAD s/p CABG  #Hypothyroid  #Hypertension  #HLD      Henna Edge MD    Supplementary Documentation:     Quality:  DVT Mechanical Prophylaxis:   SCDs, Early ambuation  DVT Pharmacologic Prophylaxis   Medication    apixaban (Eliquis) tab 5 mg         DVT Pharmacologic prophylaxis: Aspirin 81 mg      Code Status: Full Code  Garnica:  External urinary catheter in place  Garnica Duration (in days):   Central line:    CARMINA:     Discharge is dependent on: clinical   At this point Mr. Solis is expected to be discharge to: tbd     The 21st Century Cures Act makes medical notes like these available to patients in the interest of transparency. Please be advised this is a medical document. Medical documents are intended to carry relevant information, facts as evident, and the clinical opinion of the practitioner. The medical note is intended as peer to peer communication and may appear blunt or direct. It is written in medical language and may contain abbreviations or verbiage that are unfamiliar.              **Certification      PHYSICIAN Certification of Need for Inpatient Hospitalization - Initial Certification    Patient will require inpatient services that will reasonably be expected to span two midnight's based on the clinical documentation in H+P.   Based on patients current state of illness, I anticipate that, after discharge, patient will require TBD.

## 2025-02-03 NOTE — PLAN OF CARE
Assumed care at the change of shift. Pt A&Ox4, follows commands. This AM pt became febrile, tachy, and AMS- precedex gtt started briefly. CT completed. Precedex turn off. Pt became afebrile, back to baseline with mentation A&Ox4. Weaned to RA. Lasix given- good urine output, voiding via external catheter. Pt having soft pressures- MD notified- see orders. Pt and family updated on the plan of care. See flowsheets for further information.

## 2025-02-03 NOTE — SPIRITUAL CARE NOTE
Spiritual Care Visit Note    Patient Name: Jose Alfredo Solis Date of Spiritual Care Visit: 25   : 1940 Primary Dx: Disorientation       Referred By: Referral From: Nurse    Spiritual Care Taxonomy:    Intended Effects: Build relationship of care and support    Methods: Collaborate with care team member;Offer emotional support;Offer spiritual/Church support    Interventions: Acknowledge current situation;Active listening;Connect someone with their risa community/clergy;Explain  role    Visit Type/Summary:     - Spiritual Care: Responded to a request for spiritual care and assessed the patient for spiritual care needs. Consulted with RN prior to visit. Patient expressed appreciation for  visit. Provided information regarding how to contact Spiritual Care and left a Spiritual Care information card. Provided support for Patient's spiritual/Church requests. Coordinated [employer] Communion and verified NPO status. Provided Patient with a rosakiko.  remains available for follow up.    Spiritual Care support can be requested via an Epic consult. For urgent/immediate needs, please contact the On Call  at: [facility]: ext [phone]    Rev Clara Sparrow MA

## 2025-02-03 NOTE — SLP NOTE
SPEECH DAILY NOTE - INPATIENT    ASSESSMENT & PLAN   ASSESSMENT  Pt seen for dysphagia tx to assess tolerance with recommended diet, ensure proper utilization of aspiration precautions and provide pt/family education.  Patient received alert and oriented in bed. Patient reported good tolerance of PO intake, but noted ongoing chronic cough. He feels this occurs in and out of the context of PO intake. PO trials of thin liquids, mildly thick liquids, puree, and regular solids provided. PO tolerance appeared largely intact. However, patient continued to present with ongoing chronic cough. Difficult to discern if d/t aspiration event. Therefore, recommend further evaluation via VFSS. Discussed with patient who is agreeable to plan. Further recommendations pending results of VFSS.    Diet Recommendations - Solids: Mechanical soft chopped/ Soft & Bite Sized  Diet Recommendations - Liquids: Nectar thick liquids/ Mildly thick       Aspiration Precautions: No straw  Medication Administration Recommendations:  (In puree as necessary)    Patient Experiencing Pain: No                Treatment Plan  Treatment Plan/Recommendations: Videofluoroscopic swallow study    Interdisciplinary Communication: Discussed with RN            GOALS  Goal #1 Patient will tolerate soft and bite sized diet with nectar thick liquids, no straws without overt s/s of aspiration with 90% accuracy  In Progress   Goal #2 Patient will tolerate PO trials of general solids and thin liquids without overt s/s of aspiration with 90% accuracy     In Progress   Goal #3 Patient will participate in VFSS if continued concerns of aspiration are present and VFSS is clinically necessary  In Progress        FOLLOW UP  Follow Up Needed (Documentation Required): Yes  SLP Follow-up Date: 02/03/25  Duration: 1 week    Session: 1    If you have any questions, please contact MARYANNE Summers

## 2025-02-03 NOTE — PROGRESS NOTES
CRITICAL CARE PROGRESS NOTE    Patient Name: Jose Alfredo Solis  : 1940  MRN: [id number]  Admit Date: 2025  Length of Stay: 3 Days    Subjective/24h events: Yesterday patient mounted a fever with tachycardia altered mental status now all improved CT chest abdomen pelvis was done yesterday nothing in the abdomen contributory but had some signs of pulmonary edema some groundglass opacities    Assessment and Plan: 85-year-old male admitted with bacteremia E. coli secondary to urinary tract source    Neuro/Psych: Cephalopathy yesterday in the setting of fever now appears to be at baseline      Cardiovascular: Septic shock resolved secondary to E. coli bacteremia    History of atrial fibrillation home metoprolol 25 held last night but can restart given his tachycardia as well as hypertension extended release continue amiodarone  Continue factor Xa inhibitor  Restart home aspirin      Pulmonary: Mild hypoxemic respiratory failure yesterday was on 6 L during that episode of febrile illness had some pulmonary edema presumably in the setting of diastolic dysfunction from his tachycardia diuresed with Lasix doubt the patient has a true pneumonia    GI: No abdominal pathology  Swallow study today    Renal/Metabolic: No signs of kidney injury    Heme: Mild anemia likely dilutional low relative thrombocytopenia likely consumptive improving continue to monitor in the setting of bacteremia    ID: Severe leukocytosis now resolved secondary to bacteremia E. coli not ESBL on Ancef infectious disease following    Endocrine: No known history of diabetes mellitus      ICU checklist  Feeding: Regular diet  Analgesia:   Sedation:   Thromboembolism PPX: Factor Xa inhibitor  Stress Ulcer PPX:   Glucose control:   Bowel Regimen:   Lines/drains/tubes:   Deescalation of antibiotics: Cefepime to ceftriaxone  Therapies:PT/OT/ST when appropriate  Transfer to floor   Code Status: Full Code              Hemodynamics  24h  Vitals:  VitalLast Value (24 Hour)24 Hour Range  Temp98 °F (36.7 °C)Temp  Min: 97.8 °F (36.6 °C)  Max: 98.7 °F (37.1 °C)  [id number]  Min: 70  Max: 116  BP (Non-Invasive)(!) 161/68 BP  Min: 113/50  Max: 161/68  BP (A-Line)139/48AO  Min: 135/45  Max: 157/50  CVP  could not be evaluated. This SmartLink does not work with rows of the type:   [id number]  Min: 15  Max: 25  [id number] %SpO2  Min: 90 %  Max: 98 %  O2 Therapy

## 2025-02-03 NOTE — PLAN OF CARE
Assumed care of patient after RN report. Patient alert and oriented x 4. On 2L NC. Non productive cough. Afib on monitor. Abdomen soft, nontender. External catheter with rosy clear urine output. Up to chair with 1 assist. Video swallow study done, see notes. Family updated on POC. See flowsheet for full assessment.

## 2025-02-03 NOTE — OCCUPATIONAL THERAPY NOTE
OCCUPATIONAL THERAPY EVALUATION - INPATIENT     Room Number: 466/466-A  Evaluation Date: 2/3/2025  Type of Evaluation: Initial  Presenting Problem: fall, disorientation;  septic shock to UTI    Physician Order: IP Consult to Occupational Therapy  Reason for Therapy: ADL/IADL Dysfunction and Discharge Planning    OCCUPATIONAL THERAPY ASSESSMENT   Patient is currently functioning below baseline with toileting, bathing, lower body dressing, grooming, transfers, static standing balance, dynamic standing balance, functional standing tolerance, energy conservation strategies, aerobic capacity, and performing household tasks. Prior to admission, patient's baseline is modified independent with ADLs and sone IADLs using a cane in the home and a rollator in the community.  Patient is requiring supervision to moderate assist as a result of the following impairments: decreased functional reach, impaired standing balance, decreased muscular endurance, medical status, and increased O2 needs from baseline. Occupational Therapy will continue to follow for duration of hospitalization.    Patient will benefit from continued skilled OT Services to promote return to prior level of function and safety with continuous assistance and gradual rehabilitative therapy    History Related to Current Admission: Patient is a 85 year old male admitted on 1/31/2025 with Presenting Problem: fall, disorientation;  septic shock to UTI. Co-Morbidities : CVA, CAD, CABG, R TAN 2024, AF, hypothyroidism    WEIGHT BEARING RESTRICTION     Recommendations for nursing staff:   Transfers: 1 person  Toileting location: bathroom; recommend RTS over toilet    EVALUATION SESSION:  Patient Start of Session: supine    FUNCTIONAL TRANSFER ASSESSMENT  Sit to Stand: Edge of Bed; Chair  Edge of Bed: Minimal Assist  Chair: Contact Guard Assist  Toilet Transfer: Moderate Assist    BED MOBILITY     BALANCE ASSESSMENT  Static Standing: Contact Guard Assist    FUNCTIONAL ADL  ASSESSMENT  Toileting Seated: Supervision  Toileting Standing: Moderate Assist    ACTIVITY TOLERANCE: Patient denied shortness of breath/ He desaturated to 88-89% frequently while on 0.5 L and on 1.5 at rest and with activity; O2 was increased again to 2 L with ambulation . Sats remained at 89 on 2L%. Patient left on 1.5 L at rest in chair, 90-91% O2. Nurse updated.     Pulse: 92  Heart Rate Source: Monitor  Resp: (!) 36                O2 SATURATIONS  Oxygen Therapy  SPO2% on Oxygen at Rest: 90  At rest oxygen flow (liters per minute): 1.5  SPO2% Ambulation on Oxygen: 88  Ambulation oxygen flow (liters per minute): 2    COGNITION  Arousal/Alertness:  appropriate responses to stimuli  Attention Span:  appears intact  Orientation Level:  oriented x4  Following Commands:  follows one step commands without difficulty  Initiation: cues to initiate tasks  Problem Solving:  assistance required to generate solutions  Not formally tested    Upper Extremity   ROM: within functional limits   Strength: within functional limits 5/5 BUEs  Coordination  Gross motor: wfl  Fine motor: wfl  Sensation: denied UE deficits    EDUCATION PROVIDED  Patient Education : Fall Prevention; Functional Transfer Techniques; Role of Occupational Therapy; Plan of Care; Discharge Recommendations  Patient's Response to Education: Verbalized Understanding    Equipment used: rw, gait belt, O2  Demonstrates functional use, Would benefit from additional trial yes     Therapist comments: OT explained role. Patient was cued to don socks in supine. He was able to flex hips and knees to achieve this however became more winded and visibly short of breath after task. OT gave cues for rest break. Cues for initiation needed throughout session. Patient assisted from bed to chair; O2 needs adjusted for. OT instructed patient on scapular squeezes for improved respiratory function; patient noted to have limited carryover for tasks despite modelling by therapist. OT  assisted patient to bathroom for toilet transfer with MOD A needed along with cues for BUE placement to stand from standard toilet seat. Patient was educated on benefit of possible gradual.    Patient End of Session: All patient questions and concerns addressed;Hospital anti-slip socks;RN aware of session/findings;Call light within reach;Needs met;Up in chair;Alarm set    OCCUPATIONAL PROFILE    HOME SITUATION  Type of Home: House  Home Layout: Multi-level;Bed/bath upstairs  Lives With: Family (sister)    Toilet and Equipment: Standard height toilet;Grab bar  Shower/Tub and Equipment: Walk-in shower;Grab bar             Drives: No  Patient Regularly Uses: Reading glasses (cane at home, rollator in community)    Prior Level of Function:   Typically modified independent with BADLs at home with his sister in a multi level house. Patient uses cane in the home and a rollator in the community. He does not drive. States he can take out the trash and get the mail but sister does the majority of IADLs. Patient is able to manage his own medications.     SUBJECTIVE   \"It might be good to go so I can work on getting my strength back \"  ( RE: rehab facility)    PAIN ASSESSMENT  Ratin          OBJECTIVE  Precautions: Bed/chair alarm  Fall Risk: High fall risk    ASSESSMENTS    AM-PAC ‘6-Clicks’ Inpatient Daily Activity Short Form  -   Putting on and taking off regular lower body clothing?: A Little  -   Bathing (including washing, rinsing, drying)?: A Little  -   Toileting, which includes using toilet, bedpan or urinal? : A Little  -   Putting on and taking off regular upper body clothing?: A Little  -   Taking care of personal grooming such as brushing teeth?: A Little  -   Eating meals?: None    AM-PAC Score:  Score: 19  Approx Degree of Impairment: 42.8%  Standardized Score (AM-PAC Scale): 40.22    ADDITIONAL TESTS     NEUROLOGICAL FINDINGS      COGNITION ASSESSMENTS     PLAN     OT Treatment Plan: Endurance training;UE  strengthening/ROM;Functional transfer training;ADL training;Energy conservation/work simplification techniques;Balance activities;Patient/Family training;Patient/Family education;Equipment eval/education;Compensatory technique education  Rehab Potential : Good  Frequency: 3-5x/week  Number of Visits to Meet Established Goals: 4    ADL Goals   Patient will perform grooming: with supervision and while standing at sink  Patient will perform lower body dressing:  with setup, with supervision, and with adaptive equipment PRN  Patient will perform toileting: with supervision    Functional Transfer Goals  Patient will transfer from sit to supine:  with supervision  Patient will transfer from supine to sit:  with supervision  Patient will transfer to toilet:  with supervision    UE Exercise Program Goal  Patient will be supervision with bilateral AROM HEP (home exercise program).    Patient Evaluation Complexity Level:   Occupational Profile/Medical History LOW - Brief history including review of medical or therapy records    Specific performance deficits impacting engagement in ADL/IADL MODERATE  3 - 5 performance deficits   Client Assessment/Performance Deficits MODERATE - Comorbidities and min to mod modifications of tasks    Clinical Decision Making MODERATE - Analysis of occupational profile, detailed assessments, several treatment options    Overall Complexity LOW     OT Session Time: 30 minutes  Self-Care Home Management: 25 minutes

## 2025-02-03 NOTE — PLAN OF CARE
Assumed care after RN shift report. Pt Aox4. Room air/NC as needed. Robitussin PRN- non-productive cough. Afib on the monitor--scheduled metoprolol given. SCDs/eliquis. Nectar thick liquids. External cath. No BM. No pain. Afebrile. Pt updated on POC.

## 2025-02-03 NOTE — VIDEO SWALLOW STUDY NOTE
ADULT VIDEOFLUOROSCOPIC SWALLOWING STUDY    Admission Date: 1/31/2025  Evaluation Date: 02/03/25  Radiologist: Cinthya    RECOMMENDATIONS   Diet Recommendations - Solids: Regular  Diet Recommendations - Liquids: Thin Liquids    Further Follow-up:  Follow Up Needed (Documentation Required): Yes  SLP Follow-up Date: 02/04/25  Compensatory Strategies Recommended: Alternate consistencies  Aspiration Precautions: Upright position;Small bites;Small sips  Medication Administration Recommendations:  (In puree as necessary)  Treatment Plan/Recommendations: Aspiration precautions    HISTORY   Background/Objective Information:  History from Clinical Swallow Evaluation 2/2/25  RN cleared SLP to see patient, reports not difficulties with breakfast. Upon SLP arrival patient displaying shakes/tremor. Throughout evaluation, O2 ranging between 88-90%. Sister arrived and stated that patient does not typically have shakes/tremors and reports coughing when eating/drinking at home, however, denies hx of PNA.     Problem List  Principal Problem:    Disorientation  Active Problems:    Sepsis due to undetermined organism (HCC)    Sepsis with hypotension (HCC)    Hyponatremia    Thrombocytopenia    Metabolic acidosis    Pyelonephritis      Past Medical History  Past Medical History:    High cholesterol    Hypertension    Hypothyroid    Stroke (HCC)       Current Diet Consistency: Mechanical soft chopped/ Soft & Bite Sized;Nectar thick liquids/ Mildly thick  Prior Level of Function: Assistance/Support for ADL's  Prior Living Situation: Home with support  History of Recent: Difficulty breathing  Precautions: Aspiration  Imaging results:   CT Chest 2/2/25  CONCLUSION:       1. No evidence of pulmonary embolus.      2. Interlobular septal thickening along with pleural effusions is suggestive of fluid overload.      3. Ground-glass consolidations in the left lung are noted.  This may be due to infectious process.  However, this could also be due to  pulmonary edema.      4. Cholelithiasis is incidentally noted.  An acute inflammatory process in the abdomen and pelvis is not identified.             LOCATION:  [place] [id number]         Dictated by (CST): Donato Maldonado MD on 2/02/2025 at 1:59 PM       Finalized by (CST): Donato Maldonado MD on 2/02/2025 at 2:06 PM     Reason for Referral: R/O aspiration    Family/Patient Goals: none stated     ASSESSMENT   DYSPHAGIA ASSESSMENT  Test completed in conjunction with Radiologist.  Patient Positioned: Upright;Midline.  Patient Viewed: Lateral.  Patient Alertness: Fully alert.  Consistencies Presented: Thin liquids;Puree;Hard solid to assess oropharyngeal swallow function and assess for compensatory strategies to improve safe swallow function.    THIN LIQUIDS  Method of Presentation: Cup;Straw  Oral Phase of Swallow (VFSS - Thin Liquids): Within Functional Limits  Triggered at: Pyriform sinuses  Premature Spillage to: Pyriform sinuses  Residue Severity, Location: Mild/Mod;Valleculae;Pyriform sinuses  Cleared/Reduced with: Secondary swallow  Laryngeal Penetration: Trace  Tracheal Aspiration: None  Cough/Throat Clear Response: Yes  Cough/Throat Clear Effective: Yes        PUREE  Oral Phase of Swallow (VFSS - Puree): Within Functional Limits  Triggered at: Valleculae  Residue Severity, Location: Mild/Mod;Valleculae  Cleared/Reduced with: Secondary swallow  Laryngeal Penetration: None  Tracheal Aspiration: None     HARD SOLID  Oral Phase of Swallow (VFSS - Hard Solid): Within Functional Limits  Triggered at: Valleculae  Residue Severity, Location: Mild/Mod;Valleculae  Cleared/Reduced with: Secondary swallow  Laryngeal Penetration: None  Tracheal Aspiration: None  Penetration Aspiration Scale Score: Score 2: Material enters the airway, remains above the vocal folds, and is ejected from the airway       Overall Impression:   Patient presented with mild oropharyngeal dysphagia characterized by premature bolus loss and reduced base  of tongue retraction. Bolus acceptance was adequate without evidence of anterior bolus loss. Mastication was thorough and efficient. Premature bolus loss to the level of the pyriform sinuses noted with thin liquids. Base of tongue retraction was reduced resulting in mild to moderate vallecular and pyriform sinus residue. This was reduced with reflexive secondary swallow. Trace laryngeal penetration observed with thin liquids which mostly cleared with cessation of the swallow. Reflexive throat clear present and effective in clearing remaining penetrated material. No aspiration observed throughout.    Recommend patient initiate a regular diet and thin liquids. Bolus size and rate of intake should be limited. Recommend alternating solids and liquids. Patient would benefit from initiating an additional swallow every 1-2 bites/sips. Patient may benefit from use of brief oral bolus hold and effortful swallow. SLP will continue to follow to monitor diet tolerance, reinforce compensatory strategies, and initiate pharyngeal strengthening exercises. Education provided re: results and recommendations.              GOALS  Goal #1 Patient will tolerate soft and bite sized diet with nectar thick liquids, no straws without overt s/s of aspiration with 90% accuracy   Revised   Goal #2 Patient will tolerate PO trials of general solids and thin liquids without overt s/s of aspiration with 90% accuracy     Discharge   Goal #3 Patient will participate in VFSS if continued concerns of aspiration are present and VFSS is clinically necessary   Met   Goal #4 The patient will tolerate regular consistency and thin liquids without overt signs or symptoms of aspiration with 90 % accuracy over 1-2 session(s).    In Progress   Goal #5 The patient will utilize compensatory strategies as outlined by  VFSS including Multiple swallows, Alternate liquids/solids with mild assistance 90 % of the time across 2 sessions.    In Progress   Goal #6 Patient  will complete pharyngeal strengthening exercises with 90% accuracy when provided mild cues within 3 visits.    In Progress   Goal #7     Goal #8         EDUCATION/INSTRUCTION  Reviewed results and recommendations with patient/family/caregiver.  Agreement/Understanding verbalized and all questions answered to their apparent satisfaction.    INTERDISCIPLINARY COMMUNICATION  Reviewed results with Radiologist; agreement verbalized.    Patient Experiencing Pain: No                FOLLOW UP  Treatment Plan/Recommendations: Aspiration precautions  Duration: 1 week        Thank you for your referral.   If you have any questions, please contact Saw Man SLP

## 2025-02-04 ENCOUNTER — APPOINTMENT (OUTPATIENT)
Dept: GENERAL RADIOLOGY | Facility: HOSPITAL | Age: 85
End: 2025-02-04
Attending: STUDENT IN AN ORGANIZED HEALTH CARE EDUCATION/TRAINING PROGRAM
Payer: MEDICARE

## 2025-02-04 LAB
ALBUMIN SERPL-MCNC: 3.4 G/DL (ref 3.2–4.8)
ALBUMIN/GLOB SERPL: 1.5 {RATIO} (ref 1–2)
ALP LIVER SERPL-CCNC: 37 U/L
ALT SERPL-CCNC: 25 U/L
ANION GAP SERPL CALC-SCNC: 9 MMOL/L (ref 0–18)
AST SERPL-CCNC: 49 U/L (ref ?–34)
BASOPHILS # BLD AUTO: 0.01 X10(3) UL (ref 0–0.2)
BASOPHILS NFR BLD AUTO: 0.1 %
BILIRUB SERPL-MCNC: 0.7 MG/DL (ref 0.2–1.1)
BUN BLD-MCNC: 19 MG/DL (ref 9–23)
CALCIUM BLD-MCNC: 8.9 MG/DL (ref 8.7–10.6)
CHLORIDE SERPL-SCNC: 99 MMOL/L (ref 98–112)
CO2 SERPL-SCNC: 25 MMOL/L (ref 21–32)
CREAT BLD-MCNC: 0.71 MG/DL
EGFRCR SERPLBLD CKD-EPI 2021: 89 ML/MIN/1.73M2 (ref 60–?)
EOSINOPHIL # BLD AUTO: 0.04 X10(3) UL (ref 0–0.7)
EOSINOPHIL NFR BLD AUTO: 0.4 %
ERYTHROCYTE [DISTWIDTH] IN BLOOD BY AUTOMATED COUNT: 14.2 %
GLOBULIN PLAS-MCNC: 2.2 G/DL (ref 2–3.5)
GLUCOSE BLD-MCNC: 103 MG/DL (ref 70–99)
HCT VFR BLD AUTO: 29.1 %
HGB BLD-MCNC: 10.2 G/DL
IMM GRANULOCYTES # BLD AUTO: 0.3 X10(3) UL (ref 0–1)
IMM GRANULOCYTES NFR BLD: 3 %
LYMPHOCYTES # BLD AUTO: 1.04 X10(3) UL (ref 1–4)
LYMPHOCYTES NFR BLD AUTO: 10.4 %
MAGNESIUM SERPL-MCNC: 1.7 MG/DL (ref 1.6–2.6)
MCH RBC QN AUTO: 31.2 PG (ref 26–34)
MCHC RBC AUTO-ENTMCNC: 35.1 G/DL (ref 31–37)
MCV RBC AUTO: 89 FL
MONOCYTES # BLD AUTO: 3.75 X10(3) UL (ref 0.1–1)
MONOCYTES NFR BLD AUTO: 37.5 %
NEUTROPHILS # BLD AUTO: 4.85 X10 (3) UL (ref 1.5–7.7)
NEUTROPHILS # BLD AUTO: 4.85 X10(3) UL (ref 1.5–7.7)
NEUTROPHILS NFR BLD AUTO: 48.6 %
OSMOLALITY SERPL CALC.SUM OF ELEC: 279 MOSM/KG (ref 275–295)
PHOSPHATE SERPL-MCNC: 2.4 MG/DL (ref 2.4–5.1)
PLATELET # BLD AUTO: 108 10(3)UL (ref 150–450)
PLATELETS.RETICULATED NFR BLD AUTO: 5 % (ref 0–7)
POTASSIUM SERPL-SCNC: 3.4 MMOL/L (ref 3.5–5.1)
POTASSIUM SERPL-SCNC: 3.6 MMOL/L (ref 3.5–5.1)
PROT SERPL-MCNC: 5.6 G/DL (ref 5.7–8.2)
RBC # BLD AUTO: 3.27 X10(6)UL
SODIUM SERPL-SCNC: 133 MMOL/L (ref 136–145)
WBC # BLD AUTO: 10 X10(3) UL (ref 4–11)

## 2025-02-04 PROCEDURE — 73630 X-RAY EXAM OF FOOT: CPT | Performed by: STUDENT IN AN ORGANIZED HEALTH CARE EDUCATION/TRAINING PROGRAM

## 2025-02-04 PROCEDURE — 99232 SBSQ HOSP IP/OBS MODERATE 35: CPT | Performed by: STUDENT IN AN ORGANIZED HEALTH CARE EDUCATION/TRAINING PROGRAM

## 2025-02-04 RX ORDER — FUROSEMIDE 10 MG/ML
20 INJECTION INTRAMUSCULAR; INTRAVENOUS ONCE
Status: COMPLETED | OUTPATIENT
Start: 2025-02-04 | End: 2025-02-04

## 2025-02-04 RX ORDER — POTASSIUM CHLORIDE 14.9 MG/ML
20 INJECTION INTRAVENOUS ONCE
Status: DISCONTINUED | OUTPATIENT
Start: 2025-02-04 | End: 2025-02-04

## 2025-02-04 RX ORDER — POTASSIUM CHLORIDE 14.9 MG/ML
20 INJECTION INTRAVENOUS ONCE
Status: COMPLETED | OUTPATIENT
Start: 2025-02-04 | End: 2025-02-04

## 2025-02-04 RX ORDER — MAGNESIUM SULFATE 1 G/100ML
1 INJECTION INTRAVENOUS ONCE
Status: COMPLETED | OUTPATIENT
Start: 2025-02-04 | End: 2025-02-04

## 2025-02-04 RX ORDER — MAGNESIUM SULFATE HEPTAHYDRATE 40 MG/ML
2 INJECTION, SOLUTION INTRAVENOUS ONCE
Status: COMPLETED | OUTPATIENT
Start: 2025-02-04 | End: 2025-02-04

## 2025-02-04 RX ORDER — CIPROFLOXACIN 500 MG/1
500 TABLET, FILM COATED ORAL 2 TIMES DAILY
Qty: 20 TABLET | Refills: 0 | Status: SHIPPED | OUTPATIENT
Start: 2025-02-04 | End: 2025-02-07

## 2025-02-04 NOTE — CM/SW NOTE
02/04/25 1431   Choice of Post-Acute Provider   Informed patient of right to choose their preferred provider Yes   List of appropriate post-acute services provided to patient/family with quality data Yes   Information given to Patient       Therapy indicating pt would benefit from inpatient rehab setting at [facility].  Met with pt who is in agreement.  List of accepting facilities given.  Pt unsure about choice.  He asked SW to contact his sister, Moon.  Attempted to reach Moon - no response.  Attempted to reach pt's son - message left.      Message sent to [facility] to ask that she submit request for insurance auth for [facility].  Await auth and pt/family choice in facility.  / to remain available for support and/or discharge planning.     Vicky Nava, [facility]  Discharge Planner  [phone]    Addendum:  Received message from pt's son and spoke with pt's sister, Moon who are both in agreement with [facility].  Provider reserved in [facility].  Await medical clearance and insurance auth for discharge.    [facility]  [phone]

## 2025-02-04 NOTE — PHYSICAL THERAPY NOTE
PHYSICAL THERAPY TREATMENT NOTE - INPATIENT    Room Number: 386/386-A     Session: 1          Presenting Problem: fall, weakness, confusion  Co-Morbidities : CVA, CAD, CABG, R TAN 2024, AF, hypothyroidism    PHYSICAL THERAPY ASSESSMENT   Patient demonstrates fair progress this session, goals  remain in progress.      Patient is requiring moderate assist as a result of the following impairments: decreased functional strength, decreased endurance/aerobic capacity, impaired standing  balance, impaired coordination, impaired motor planning, decreased muscular endurance, and increased O2 needs from baseline.     Patient continues to function below baseline with transfers, gait, and stair negotiation.  Next session anticipate patient to progress gait.  Physical Therapy will continue to follow patient for duration of hospitalization.    Patient continues to benefit from continued skilled PT services: to promote return to prior level of function and safety with continuous assistance and gradual rehabilitative therapy .    PLAN DURING HOSPITALIZATION  Nursing Mobility Recommendation : 1 Assist  PT Device Recommendation: Rolling walker  PT Treatment Plan: Bed mobility;Patient education;Gait training;Range of motion;Strengthening;Stair training;Transfer training  Frequency (Obs): 3-5x/week     CURRENT GOALS       Goal #1 Patient is able to demonstrate supine - sit EOB @ level: modified independent      Goal #2 Patient is able to demonstrate transfers Sit to/from Stand at assistance level:modified independent      Goal #3 Patient is able to ambulate 300 feet with assist device: walker - rolling at assistance level: modified independent      Goal #4 Patient to be modified independent to ascend/descend 11 stairs step-to pattern with 2 railings   Goal #5     Goal #6     Goal Comments: Goals established on 2/1/2025 2/4/2025 all goals ongoing     SUBJECTIVE  \"I have stairs in my house but we have railings\"      OBJECTIVE  Precautions: Bed/chair alarm    WEIGHT BEARING RESTRICTION     PAIN ASSESSMENT   Ratin          BALANCE                                                                                                                       Static Sitting: Fair  Dynamic Sitting: Fair -           Static Standing: Poor +  Dynamic Standing: Poor    ACTIVITY TOLERANCE                         O2 WALK       AM-PAC '6-Clicks' INPATIENT SHORT FORM - BASIC MOBILITY  How much difficulty does the patient currently have...  Patient Difficulty: Turning over in bed (including adjusting bedclothes, sheets and blankets)?: A Little   Patient Difficulty: Sitting down on and standing up from a chair with arms (e.g., wheelchair, bedside commode, etc.): A Little   Patient Difficulty: Moving from lying on back to sitting on the side of the bed?: A Little   How much help from another person does the patient currently need...   Help from Another: Moving to and from a bed to a chair (including a wheelchair)?: A Little   Help from Another: Need to walk in hospital room?: A Lot   Help from Another: Climbing 3-5 steps with a railing?: Total     AM-PAC Score:  Raw Score: 15   Approx Degree of Impairment: 57.7%   Standardized Score (AM-PAC Scale): 39.45   CMS Modifier (G-Code): CK    FUNCTIONAL ABILITY STATUS  Gait Assessment   Functional Mobility/Gait Assessment  Gait Assistance: Moderate assistance  Distance (ft): 40  Assistive Device: Rolling walker  Pattern: Festinating;Shuffle;R Flexed knee    Skilled Therapy Provided  Pt presents seated in BS chair, pt's sister at BS   Therapist cued pt for seated therex for BLE pregait strengthening and ROM   Pt with LOB posteriorly upon standing, requires mod A -   Pt gait trained c TORI and miriam unsteady gait with difficulty motor planning.   Pt on 2L O2 NC with sats WNL  Educated pt on fall prevention and use of call light .   Bed Mobility:  Rolling:    Supine<>Sit:    Sit<>Supine:      Transfer  Mobility:  Sit<>Stand: min A    Stand<>Sit: min A    Gait: mod A  c RW    Therapist's Comments: pt's sister present during session and reports pt's gait quality is significantly below his baseline .       THERAPEUTIC EXERCISES  Lower Extremity Alternating marching  Knee extension  LAQ     Upper Extremity      Position Sitting     Repetitions   20   Sets   2     Patient End of Session: Up in chair;Needs met;Call light within reach;RN aware of session/findings;All patient questions and concerns addressed;Hospital anti-slip socks;Alarm set;Family present    PT Session Time: 28 minutes  Gait Training: 15 minutes  Therapeutic Activity: 13 minutes  Therapeutic Exercise:  minutes   Neuromuscular Re-education:  minutes

## 2025-02-04 NOTE — PROGRESS NOTES
[facility]   part of [facility]     Hospitalist Progress Note     Jose Alfredo Solis Patient Status:  Inpatient    1940 MRN [id number]   Location [facility] 4SW-A Attending Henna Edge MD   Hosp Day # 4 PCP MIKAYLA BAUGH, MD ROSEANN     Chief Complaint: Weakness     Subjective:     Patient  on supplemental O2- feels SOB    Objective:    Review of Systems:   A comprehensive review of systems was completed; pertinent positive and negatives stated in subjective.    Vital signs:  Temp:  [97.6 °F (36.4 °C)-98.5 °F (36.9 °C)] 97.6 °F (36.4 °C)  Pulse:  [] 80  Resp:  [16-36] 22  BP: ()/(48-82) 115/48  SpO2:  [88 %-97 %] 94 %    Physical Exam:    General: No acute distress  Respiratory: No wheezes, no rhonchi  Cardiovascular: S1, S2, regular rate and rhythm  Abdomen: Soft, Non-tender, non-distended, positive bowel sounds  Neuro: No new focal deficits.   Extremities: No edema      Diagnostic Data:    Labs:  Recent Labs   Lab 25  0103 25  0605 25  1928 25  03425  011   WBC 3.8* 26.0* 19.1* 17.5*  --  6.8 8.4 10.0   HGB 13.4 11.4* 11.4* 11.4*  --  10.9* 10.6* 10.2*   MCV 90.8 91.7 98.1 89.9  --  89.8 88.8 89.0   .0* 125.0* 109.0* 104.0*  --  95.0* 101.0* 108.0*   BAND 8 11  --   --   --   --   --   --    INR 1.87* 1.91*  --   --  1.52* 1.39*  --   --        Recent Labs   Lab 25  03325  04225  03325  0115   * 137* 125* 103*   BUN 25* 22 16 19   CREATSERUM 0.76 0.76 0.66* 0.71   CA 9.5 8.8 8.8 8.9   ALB 3.5 3.3  --  3.4    136 134* 133*   K 4.0 3.6 3.1* 3.4*    103 99 99   CO2 22.0 24.0 25.0 25.0   ALKPHO 44* 41*  --  37*   AST 46* 43*  --  49*   ALT 33 33  --  25   BILT 1.0 0.8  --  0.7   TP 6.0 5.4*  --  5.6*       Estimated Glomerular Filtration Rate: 88.8 mL/min/1.73m2 (by CKD-EPI based on SCr of 0.71 mg/dL).    Recent Labs   Lab 25  0103   TROPHS 13        Recent Labs   Lab 01/31/25  0605 02/01/25  0340 02/02/25  0420   PTP 22.1* 18.5* 17.2*   INR 1.91* 1.52* 1.39*                  Microbiology    Hospital Encounter on 01/31/25   1. Blood Culture     Status: None (Preliminary result)    Collection Time: 02/01/25  8:40 AM    Specimen: Blood,peripheral   Result Value Ref Range    Blood Culture Result No Growth 2 Days N/A   2. Urine Culture, Routine     Status: Abnormal    Collection Time: 01/31/25  1:39 AM    Specimen: Urine, clean catch   Result Value Ref Range    Urine Culture >100,000 CFU/ML Escherichia coli (A) N/A       Susceptibility    Escherichia coli -  (no method available)     Ampicillin 4 Sensitive      Cefazolin <=4 Sensitive      Ciprofloxacin <=0.25 Sensitive      Gentamicin <=1 Sensitive      Meropenem <=0.25 Sensitive      Levofloxacin <=0.12 Sensitive      Nitrofurantoin <=16 Sensitive      Piperacillin + Tazobactam <=4 Sensitive      Trimethoprim/Sulfa <=20 Sensitive          Imaging: Reviewed in Epic.    Medications:    magnesium sulfate  1 g Intravenous Once    furosemide  20 mg Intravenous Once    ceFAZolin  2 g Intravenous Q8H    aspirin  81 mg Oral Daily    atorvastatin  20 mg Oral Nightly    metoprolol succinate ER  25 mg Oral 2x Daily(Beta Blocker)    apixaban  5 mg Oral BID    amiodarone  200 mg Oral Daily    pantoprazole  40 mg Oral QAM AC    tamsulosin  0.4 mg Oral Daily       Assessment & Plan:      #Septic shock due to Ecoli UTI and bacteremia- shock resolved, off pressors  Repeat blood Cx 2/1 NGTD   IV Abx: Ancef   ID on Cs-IV Abx  #Afib, permanent  BB  DOAC, Amio  #h/o CAD s/p CABG  #Hypothyroid  #Hypertension  #HLD  #dyspnea on 2/4/25  Lasix IV         Henna Edge MD    Supplementary Documentation:     Quality:  DVT Mechanical Prophylaxis:   SCDs, Early ambuation  DVT Pharmacologic Prophylaxis   Medication    apixaban (Eliquis) tab 5 mg         DVT Pharmacologic prophylaxis: Aspirin 81 mg      Code Status: Full Code  Garnica:  External urinary catheter in place  Garnica Duration (in days):   Central line:    CARMINA: 2/4/2025    Discharge is dependent on: clinical   At this point Mr. Solis is expected to be discharge to: tbd     The 21st Century Cures Act makes medical notes like these available to patients in the interest of transparency. Please be advised this is a medical document. Medical documents are intended to carry relevant information, facts as evident, and the clinical opinion of the practitioner. The medical note is intended as peer to peer communication and may appear blunt or direct. It is written in medical language and may contain abbreviations or verbiage that are unfamiliar.              **Certification      PHYSICIAN Certification of Need for Inpatient Hospitalization - Initial Certification    Patient will require inpatient services that will reasonably be expected to span two midnight's based on the clinical documentation in H+P.   Based on patients current state of illness, I anticipate that, after discharge, patient will require TBD.

## 2025-02-04 NOTE — PLAN OF CARE
Asssumed plan of care after RN shift report. Pt Aox4. Follow commands. 2L increased to 6L NC while patient is sleeping. Non productive cough. Diminished lung sounds. Tessalon/robitussin PRN. Afib on the monitor. Pt had increasing ectopy on the monitor----MD made aware, labs drawn, magnesium and potassium replaced. SCDs/eliquis. External cath intact. No BM. Soft/nontender abdomen. Tylenol PRN for headache. Transfer orders. Pt updated on POC.     0315: Report given, pt ready to transport to room 386 when it becomes available.

## 2025-02-04 NOTE — PROGRESS NOTES
INFECTIOUS DISEASE  PROGRESS NOTE            [facility]    Jose Alfredo Solis Patient Status:  Inpatient    1940 MRN [id number]   Formerly [facility] 4SW-A Attending Henna Edge MD   Hosp Day # 4 PCP MIKAYLA BAUGH, MD ROSEANN     Antibiotics: #4  Cefazolin #1    Subjective:  : resting comfortable    Objective:  Temp:  [97.6 °F (36.4 °C)-98.5 °F (36.9 °C)] 97.6 °F (36.4 °C)  Pulse:  [] 80  Resp:  [16-36] 22  BP: ()/(48-82) 115/48  SpO2:  [88 %-97 %] 94 %    General: awake alert  Vital signs:Temp:  [97.6 °F (36.4 °C)-98.5 °F (36.9 °C)] 97.6 °F (36.4 °C)  Pulse:  [] 80  Resp:  [16-36] 22  BP: ()/(48-82) 115/48  SpO2:  [88 %-97 %] 94 %  HEENT: Moist mucous membranes. Extraocular muscles are intact.  Neck: supple no masses  Respiratory: Non labored, symmetric excursion, normal respirations  Cardiovascular: no irregularities in rhythm  Abdomen: Soft, nontender, nondistended.   Musculoskeletal: joints: no swelling   Integument: No lesions. No erythema. No open wounds.  Labs:     COVID-19 Lab Results    COVID-19  Lab Results   Component Value Date    COVID19 Not Detected 2025       Pro-Calcitonin  Recent Labs   Lab 25  0103   PCT 2.47*       Cardiac  No results for input(s): \"TROP\", \"PBNP\" in the last 168 hours.    Creatinine Kinase  No results for input(s): \"CK\" in the last 168 hours.    Inflammatory Markers  No results for input(s): \"CRP\", \"ARIK\", \"LDH\", \"DDIMER\" in the last 168 hours.    Recent Labs   Lab 25  0605 25  1928 25  0115   RBC 3.63*   < > 3.27*   HGB 11.4*   < > 10.2*   HCT 33.3*   < > 29.1*   MCV 91.7   < > 89.0   MCH 31.4   < > 31.2   MCHC 34.2   < > 35.1   RDW 14.2   < > 14.2   NEPRELIM 20.03*   < > 4.85   WBC 26.0*   < > 10.0   .0*   < > 108.0*   NEUT 78  --   --    LYMPH 4  --   --    MON 7  --   --    EOS 0  --   --     < > = values in this interval not displayed.         Recent Labs   Lab 25  0339 25  0420  02/03/25  0335 02/04/25  0115   * 137* 125* 103*   BUN 25* 22 16 19   CREATSERUM 0.76 0.76 0.66* 0.71   CA 9.5 8.8 8.8 8.9   ALB 3.5 3.3  --  3.4    136 134* 133*   K 4.0 3.6 3.1* 3.4*    103 99 99   CO2 22.0 24.0 25.0 25.0   ALKPHO 44* 41*  --  37*   AST 46* 43*  --  49*   ALT 33 33  --  25   BILT 1.0 0.8  --  0.7   TP 6.0 5.4*  --  5.6*       No results found for: \"VANCT\"  Microbiology    Hospital Encounter on 01/31/25   1. Blood Culture     Status: None (Preliminary result)    Collection Time: 02/01/25  8:40 AM    Specimen: Blood,peripheral   Result Value Ref Range    Blood Culture Result No Growth 2 Days N/A   2. Urine Culture, Routine     Status: Abnormal    Collection Time: 01/31/25  1:39 AM    Specimen: Urine, clean catch   Result Value Ref Range    Urine Culture >100,000 CFU/ML Escherichia coli (A) N/A       Susceptibility    Escherichia coli -  (no method available)     Ampicillin 4 Sensitive      Cefazolin <=4 Sensitive      Ciprofloxacin <=0.25 Sensitive      Gentamicin <=1 Sensitive      Meropenem <=0.25 Sensitive      Levofloxacin <=0.12 Sensitive      Nitrofurantoin <=16 Sensitive      Piperacillin + Tazobactam <=4 Sensitive      Trimethoprim/Sulfa <=20 Sensitive        Problem list reviewed:  Patient Active Problem List   Diagnosis    Trochanteric bursitis of left hip    Disorientation    Sepsis due to undetermined organism (HCC)    Sepsis with hypotension (HCC)    Hyponatremia    Thrombocytopenia    Metabolic acidosis    Pyelonephritis             ASSESSMENT/PLAN:  1. E coli UTI and bacteremia  -reported slow stream in the past  Currently voiding with external cath  Has been voiding without signifciant residual  CT no stone or hydro    Continue Cefazolin  PO cipro for dc    2. Respiratory, cough, gound glass consolidations on CT      3. Gallstones    4. CAD SP CABG/Afib      Ahsan Norton MD, MD [facility]  [phone]

## 2025-02-04 NOTE — PROGRESS NOTES
NURSING ADMISSION NOTE      Patient admitted via Cart from ICU  Oriented to room.  Safety precautions initiated.  Bed in low position.  Call light in reach.  Afib 70s on telemetry  4L O2 tolerating at 95%  Afebrile  Denies pain  Cough present

## 2025-02-04 NOTE — CM/SW NOTE
Prior Authorization - Destination  Destination Type: Skilled nursing facility  Service Provider: Chel  Payer Communication Destination Comments: [id number]  Prior Authorization Status: Submitted/Pending     Reina Vences  DSC

## 2025-02-04 NOTE — CONGREGATE LIVING REVIEW
[facility] Living Authorization    The [facility] Review Committee has reviewed this case and the patient IS APPROVED for discharge to a facility for Short Term Skilled once the following procedure is followed:     - The physician discharge instructions (contained within the KELSIE note for SNF) must inlcude the below appropriate and approved COVID instructions to the facility    For questions regarding CLRC approval process, please contact the CM assigned to the case.  For questions regarding RN discharge workflow, please contact the unit Clinical Leader.

## 2025-02-04 NOTE — SPIRITUAL CARE NOTE
Spiritual Care Visit Note    Patient Name: Jose Alfredo Solis Date of Spiritual Care Visit: 25   : 1940 Primary Dx: Disorientation       Referred By: Referral From: Nurse    Spiritual Care Taxonomy:    Intended Effects: Build relationship of care and support    Methods: Collaborate with care team member;Offer emotional support;Offer spiritual/Rastafari support    Interventions: Acknowledge current situation;Active listening;Connect someone with their risa community/clergy;Explain  role    Visit Type/Summary:     - Spiritual Care: Responded to a request for spiritual care and assessed the patient for spiritual care needs. Consulted with RN prior to visit. Provided support for Patient's spiritual/Rastafari requests. Coordinated [employer] Communion and verified NPO status.    Spiritual Care support can be requested via an Epic consult.   For urgent/immediate needs, please contact the On Call  at: Edward: ext [phone]    Rev. Edi Arrieta M.Div., M.T.S.,   Certified Grief Counseling Specialist  Advanced Practice Board Certified

## 2025-02-04 NOTE — PLAN OF CARE
Patient is Aox4, VSS- weaned down to 2L, pain controlled, IV abx as ordered, IV lasix, neurovascularly intact, BM today. Call light and belongings within reach.

## 2025-02-05 ENCOUNTER — APPOINTMENT (OUTPATIENT)
Dept: CV DIAGNOSTICS | Facility: HOSPITAL | Age: 85
End: 2025-02-05
Attending: HOSPITALIST
Payer: MEDICARE

## 2025-02-05 ENCOUNTER — APPOINTMENT (OUTPATIENT)
Dept: GENERAL RADIOLOGY | Facility: HOSPITAL | Age: 85
End: 2025-02-05
Attending: HOSPITALIST
Payer: MEDICARE

## 2025-02-05 LAB
MAGNESIUM SERPL-MCNC: 2.1 MG/DL (ref 1.6–2.6)
NT-PROBNP SERPL-MCNC: 5617 PG/ML (ref ?–450)
PHOSPHATE SERPL-MCNC: 2.5 MG/DL (ref 2.4–5.1)

## 2025-02-05 PROCEDURE — 93306 TTE W/DOPPLER COMPLETE: CPT | Performed by: HOSPITALIST

## 2025-02-05 PROCEDURE — 71045 X-RAY EXAM CHEST 1 VIEW: CPT | Performed by: HOSPITALIST

## 2025-02-05 PROCEDURE — 99232 SBSQ HOSP IP/OBS MODERATE 35: CPT | Performed by: HOSPITALIST

## 2025-02-05 RX ORDER — FUROSEMIDE 10 MG/ML
40 INJECTION INTRAMUSCULAR; INTRAVENOUS ONCE
Status: COMPLETED | OUTPATIENT
Start: 2025-02-05 | End: 2025-02-05

## 2025-02-05 NOTE — CM/SW NOTE
Received call from [facility] confirming insurance approval for AYDEE at [facility] effective 2/6-2/10.  Updated RN.  If pt is medically ready for DC today, insurance approval can be moved to today.  Updated [facility].  Await medical clearance for discharge.  / to remain available for support and/or discharge planning.     [facility]  [phone]    Vicky Nava [facility]  Discharge Planner  [phone]

## 2025-02-05 NOTE — OCCUPATIONAL THERAPY NOTE
OCCUPATIONAL THERAPY TREATMENT NOTE - INPATIENT     Room Number: 386/386-A  Session: 1   Number of Visits to Meet Established Goals: 4    Presenting Problem: fall, disorientation;  septic shock to UTI    ASSESSMENT   Patient demonstrates good  progress this session, goals remain in progress.    Patient continues to function below baseline with toileting, bathing, lower body dressing, bed mobility, transfers, dynamic standing balance, and functional standing tolerance.   Contributing factors to remaining limitations include decreased functional strength, decreased functional reach, decreased endurance, impaired standing balance, increased O2 needs from baseline, and decreased safety awareness.  Next session anticipate patient to progress toileting, lower body dressing, bed mobility, transfers, dynamic standing balance, and functional standing tolerance.  Occupational Therapy will continue to follow patient for duration of hospitalization.    Patient continues to benefit from continued skilled OT services: to promote return to prior level of function and safety with continuous assistance and gradual rehabilitative therapy.     History Related to Current Admission: Patient is a 85 year old male admitted on 1/31/2025 with Presenting Problem: fall, disorientation;  septic shock to UTI. Co-Morbidities : CVA, CAD, CABG, R TAN 2024, AF, hypothyroidism    WEIGHT BEARING RESTRICTION       Recommendations for nursing staff:   Transfers[facility] 1-person  [facility]    TREATMENT SESSION:  Patient Start of Session: chair    FUNCTIONAL TRANSFER ASSESSMENT  Sit to Stand: Edge of Bed; Chair  Edge of Bed: Not Tested  Chair: Contact Guard Assist  Toilet Transfer: Not Tested    BED MOBILITY  Sit to Supine (OT): Supervision    BALANCE ASSESSMENT  Static Standing: Contact Guard Assist    FUNCTIONAL ADL ASSESSMENT  Toileting Seated: Not Tested  Toileting Standing: Minimal Assist    ACTIVITY TOLERANCE:                          O2  SATURATIONS       EDUCATION PROVIDED  Patient Education : Role of Occupational Therapy; Functional Transfer Techniques; Fall Prevention; Posture/Positioning  Patient's Response to Education: Verbalized Understanding; Requires Further Education    Equipment used: RW  Demonstrates functional use, Would benefit from additional trial      Therapist comments: Patient motivated and participatory. Patient reinforced on safety precautions and incorporation into ADLs and transfers, followed with fair return demo. Functional mobility between chair and edge of bed via RW and min assist. See Functional Assessment sections above for additional information on patient's performance on ADLs and functional transfers this session. Will continue to follow.     Patient End of Session: In bed;With  staff;Needs met;Call light within reach;RN aware of session/findings;All patient questions and concerns addressed;Hospital anti-slip socks    SUBJECTIVE  \"I can get back to bed\"    PAIN ASSESSMENT  Ratin  Location: denies        OBJECTIVE  Precautions: Bed/chair alarm    AM-PAC ‘6-Clicks’ Inpatient Daily Activity Short Form  -   Putting on and taking off regular lower body clothing?: A Little  -   Bathing (including washing, rinsing, drying)?: A Little  -   Toileting, which includes using toilet, bedpan or urinal? : A Little  -   Putting on and taking off regular upper body clothing?: A Little  -   Taking care of personal grooming such as brushing teeth?: A Little  -   Eating meals?: None    AM-PAC Score:  Score: 19  Approx Degree of Impairment: 42.8%  Standardized Score (AM-PAC Scale): 40.22    PLAN     OT Treatment Plan: Endurance training;UE strengthening/ROM;Functional transfer training;ADL training;Energy conservation/work simplification techniques;Balance activities;Patient/Family training;Patient/Family education;Equipment eval/education;Compensatory technique education  Rehab Potential : Good  Frequency: 3-5x/week    Goals in  progress 2/5  ADL Goals   Patient will perform grooming: with supervision and while standing at sink  Patient will perform lower body dressing:  with setup, with supervision, and with adaptive equipment PRN  Patient will perform toileting: with supervision    Functional Transfer Goals  Patient will transfer from sit to supine:  with supervision  Patient will transfer from supine to sit:  with supervision  Patient will transfer to toilet:  with supervision    UE Exercise Program Goal  Patient will be supervision with bilateral AROM HEP (home exercise program).    OT Session Time: 10 minutes  Therapeutic Activity: 10 minutes

## 2025-02-05 NOTE — PROGRESS NOTES
INFECTIOUS DISEASE  PROGRESS NOTE            [facility]    Jose Alfredo Solis Patient Status:  Inpatient    1940 MRN [id number]   formerly [facility] 4SW-A Attending Henna Edge MD   Hosp Day # 5 PCP MIKAYLA BAUGH, MD ROSEANN     Antibiotics: #5  Cefazolin #2    Subjective:  : resting comfortable    Objective:  Temp:  [97.6 °F (36.4 °C)-99.4 °F (37.4 °C)] 98 °F (36.7 °C)  Pulse:  [67-95] 75  Resp:  [22-32] 30  BP: (102-129)/(46-71) 110/50  SpO2:  [91 %-96 %] 96 %    General: awake alert  Vital signs:Temp:  [97.6 °F (36.4 °C)-99.4 °F (37.4 °C)] 98 °F (36.7 °C)  Pulse:  [67-95] 75  Resp:  [22-32] 30  BP: (102-129)/(46-71) 110/50  SpO2:  [91 %-96 %] 96 %  HEENT: Moist mucous membranes. Extraocular muscles are intact.  Neck: supple no masses  Respiratory: Non labored, symmetric excursion, normal respirations  Cardiovascular: no irregularities in rhythm  Abdomen: Soft, nontender, nondistended.   Musculoskeletal: joints: no swelling   Integument: No lesions. No erythema. No open wounds.  Labs:     COVID-19 Lab Results    COVID-19  Lab Results   Component Value Date    COVID19 Not Detected 2025       Pro-Calcitonin  Recent Labs   Lab 25  0103   PCT 2.47*       Cardiac  No results for input(s): \"TROP\", \"PBNP\" in the last 168 hours.    Creatinine Kinase  No results for input(s): \"CK\" in the last 168 hours.    Inflammatory Markers  No results for input(s): \"CRP\", \"ARIK\", \"LDH\", \"DDIMER\" in the last 168 hours.    Recent Labs   Lab 25  0605 25  1928 25  0115   RBC 3.63*   < > 3.27*   HGB 11.4*   < > 10.2*   HCT 33.3*   < > 29.1*   MCV 91.7   < > 89.0   MCH 31.4   < > 31.2   MCHC 34.2   < > 35.1   RDW 14.2   < > 14.2   NEPRELIM 20.03*   < > 4.85   WBC 26.0*   < > 10.0   .0*   < > 108.0*   NEUT 78  --   --    LYMPH 4  --   --    MON 7  --   --    EOS 0  --   --     < > = values in this interval not displayed.         Recent Labs   Lab 25  0339 25  0426  02/03/25  0335 02/04/25  0115 02/04/25  1436   * 137* 125* 103*  --    BUN 25* 22 16 19  --    CREATSERUM 0.76 0.76 0.66* 0.71  --    CA 9.5 8.8 8.8 8.9  --    ALB 3.5 3.3  --  3.4  --     136 134* 133*  --    K 4.0 3.6 3.1* 3.4* 3.6    103 99 99  --    CO2 22.0 24.0 25.0 25.0  --    ALKPHO 44* 41*  --  37*  --    AST 46* 43*  --  49*  --    ALT 33 33  --  25  --    BILT 1.0 0.8  --  0.7  --    TP 6.0 5.4*  --  5.6*  --        No results found for: \"VANCT\"  Microbiology    Hospital Encounter on 01/31/25   1. Blood Culture     Status: None (Preliminary result)    Collection Time: 02/01/25  8:40 AM    Specimen: Blood,peripheral   Result Value Ref Range    Blood Culture Result No Growth 3 Days N/A   2. Urine Culture, Routine     Status: Abnormal    Collection Time: 01/31/25  1:39 AM    Specimen: Urine, clean catch   Result Value Ref Range    Urine Culture >100,000 CFU/ML Escherichia coli (A) N/A       Susceptibility    Escherichia coli -  (no method available)     Ampicillin 4 Sensitive      Cefazolin <=4 Sensitive      Ciprofloxacin <=0.25 Sensitive      Gentamicin <=1 Sensitive      Meropenem <=0.25 Sensitive      Levofloxacin <=0.12 Sensitive      Nitrofurantoin <=16 Sensitive      Piperacillin + Tazobactam <=4 Sensitive      Trimethoprim/Sulfa <=20 Sensitive        Problem list reviewed:  Patient Active Problem List   Diagnosis    Trochanteric bursitis of left hip    Disorientation    Sepsis due to undetermined organism (HCC)    Sepsis with hypotension (HCC)    Hyponatremia    Thrombocytopenia    Metabolic acidosis    Pyelonephritis             ASSESSMENT/PLAN:  1. E coli UTI and bacteremia  -reported slow stream in the past  Currently voiding with external cath  Has been voiding without signifciant residual  CT no stone or hydro    Continue Cefazolin  PO cipro for dc    2. Respiratory, cough, gound glass consolidations on CT      3. Gallstones    4. CAD SP CABG/Afib      Ahsan Norton MD,  MD  [facility]  [phone]

## 2025-02-05 NOTE — PLAN OF CARE
Alert and oriented x4. VSS. On 2L NC. . IS encouraged. Telemetry monitoring. Tolerating diet. Voiding without difficulty. Denies pain at this time. IV abx as ordered. Plan dc to [facility] when medically cleared.

## 2025-02-05 NOTE — SLP NOTE
SPEECH DAILY NOTE - INPATIENT    ASSESSMENT & PLAN   ASSESSMENT  Pt seen for dysphagia tx to assess tolerance with recommended diet, ensure proper utilization of aspiration precautions and provide pt/family education.  Patient received alert in bed. He reported ongoing chronic cough which was present upon my arrival to room. Education provided re: results and recommendations of VFSS completed 2/3/25. Education also provided re: brief oral bolus hold and effortful swallow. Patient was able to appropriately demonstrate these strategies with PO trials of thin liquids when provided mild cues. No overt s/s of aspiration observed during these tasks. Recommend patient continue a regular diet and thin liquids. Bolus size and rate of intake should be limited. Recommend use of brief oral bolus hold and effortful swallow. Patient may also benefit from additional dry swallow every 1-2 bite/sips. SLP will continue to follow per POC.    Diet Recommendations - Solids: Regular  Diet Recommendations - Liquids: Thin Liquids    Compensatory Strategies Recommended: Alternate consistencies  Aspiration Precautions: Upright position;Small bites;Small sips  Medication Administration Recommendations:  (In puree as necessary)    Patient Experiencing Pain: No                Treatment Plan  Treatment Plan/Recommendations: Dysphagia therapy    Interdisciplinary Communication: NA            GOALS  Goal #1 Patient will tolerate soft and bite sized diet with nectar thick liquids, no straws without overt s/s of aspiration with 90% accuracy   Revised   Goal #2 Patient will tolerate PO trials of general solids and thin liquids without overt s/s of aspiration with 90% accuracy      Discharge   Goal #3 Patient will participate in VFSS if continued concerns of aspiration are present and VFSS is clinically necessary   Met   Goal #4 The patient will tolerate regular consistency and thin liquids without overt signs or symptoms of aspiration with 90 %  accuracy over 1-2 session(s).     In Progress   Goal #5 The patient will utilize compensatory strategies as outlined by  VFSS including Multiple swallows, Alternate liquids/solids with mild assistance 90 % of the time across 2 sessions.     In Progress   Goal #6 Patient will complete pharyngeal strengthening exercises with 90% accuracy when provided mild cues within 3 visits.     In Progress   Goal #7       Goal #8            FOLLOW UP  Follow Up Needed (Documentation Required): Yes  SLP Follow-up Date: 02/06/25  Duration: 1 week    Session: 1 s/p VFSS    If you have any questions, please contact MARYANNE Summers

## 2025-02-05 NOTE — PROGRESS NOTES
[facility]   part of [facility]     Hospitalist Progress Note     Jose Alfredo Solis Patient Status:  Inpatient    1940 MRN [id number]   Location [facility] 4SW-A Attending Henna Edge MD   Hosp Day # 5 PCP MIKAYLA BAUGH, MD ROSEANN     Chief Complaint: Weakness     Subjective:     off O2- feels SOB. Still coughing    Objective:    Review of Systems:   A comprehensive review of systems was completed; pertinent positive and negatives stated in subjective.    Vital signs:  Temp:  [98 °F (36.7 °C)-99.4 °F (37.4 °C)] 99.1 °F (37.3 °C)  Pulse:  [67-95] 78  Resp:  [18-30] 27  BP: (102-129)/(43-71) 115/53  SpO2:  [92 %-96 %] 92 %    Physical Exam:    General: No acute distress  Respiratory: No wheezes, no rhonchi  Cardiovascular: S1, S2, regular rate and rhythm  Abdomen: Soft, Non-tender, non-distended, positive bowel sounds  Neuro: No new focal deficits.   Extremities: No edema      Diagnostic Data:    Labs:  Recent Labs   Lab 25  0103 25  0605 25  1928 25  03325  03425  03325  011   WBC 3.8* 26.0* 19.1* 17.5*  --  6.8 8.4 10.0   HGB 13.4 11.4* 11.4* 11.4*  --  10.9* 10.6* 10.2*   MCV 90.8 91.7 98.1 89.9  --  89.8 88.8 89.0   .0* 125.0* 109.0* 104.0*  --  95.0* 101.0* 108.0*   BAND 8 11  --   --   --   --   --   --    INR 1.87* 1.91*  --   --  1.52* 1.39*  --   --        Recent Labs   Lab 25  03325  04225  03325  0115 25  1436   * 137* 125* 103*  --    BUN 25* 22 16 19  --    CREATSERUM 0.76 0.76 0.66* 0.71  --    CA 9.5 8.8 8.8 8.9  --    ALB 3.5 3.3  --  3.4  --     136 134* 133*  --    K 4.0 3.6 3.1* 3.4* 3.6    103 99 99  --    CO2 22.0 24.0 25.0 25.0  --    ALKPHO 44* 41*  --  37*  --    AST 46* 43*  --  49*  --    ALT 33 33  --  25  --    BILT 1.0 0.8  --  0.7  --    TP 6.0 5.4*  --  5.6*  --        Estimated Glomerular Filtration Rate: 88.8 mL/min/1.73m2 (by CKD-EPI  based on SCr of 0.71 mg/dL).    Recent Labs   Lab 01/31/25  0103   TROPHS 13       Recent Labs   Lab 01/31/25  0605 02/01/25  0340 02/02/25  0420   PTP 22.1* 18.5* 17.2*   INR 1.91* 1.52* 1.39*                  Microbiology    Hospital Encounter on 01/31/25   1. Blood Culture     Status: None (Preliminary result)    Collection Time: 02/01/25  8:40 AM    Specimen: Blood,peripheral   Result Value Ref Range    Blood Culture Result No Growth 4 Days N/A   2. Urine Culture, Routine     Status: Abnormal    Collection Time: 01/31/25  1:39 AM    Specimen: Urine, clean catch   Result Value Ref Range    Urine Culture >100,000 CFU/ML Escherichia coli (A) N/A       Susceptibility    Escherichia coli -  (no method available)     Ampicillin 4 Sensitive      Cefazolin <=4 Sensitive      Ciprofloxacin <=0.25 Sensitive      Gentamicin <=1 Sensitive      Meropenem <=0.25 Sensitive      Levofloxacin <=0.12 Sensitive      Nitrofurantoin <=16 Sensitive      Piperacillin + Tazobactam <=4 Sensitive      Trimethoprim/Sulfa <=20 Sensitive          Imaging: Reviewed in Epic.    Medications:    sodium phosphate  15 mmol Intravenous Once    furosemide  40 mg Intravenous Once    ceFAZolin  2 g Intravenous Q8H    aspirin  81 mg Oral Daily    atorvastatin  20 mg Oral Nightly    metoprolol succinate ER  25 mg Oral 2x Daily(Beta Blocker)    apixaban  5 mg Oral BID    amiodarone  200 mg Oral Daily    pantoprazole  40 mg Oral QAM AC    tamsulosin  0.4 mg Oral Daily       Assessment & Plan:      #Septic shock due to Ecoli UTI and bacteremia- shock resolved, off pressors  Repeat blood Cx 2/1 NGTD   IV Abx: Ancef   ID on Cs-IV Abx, dc on PO     #Afib, permanent  BB  DOAC, Amio    #h/o CAD s/p CABG  #Hypothyroid  #Hypertension  #HLD    #dyspnea on 2/4/25 2/2 pulmonary edema. Doubt PNA  CXR unchanged  Lasix IV, redose 40 mg today  BNP  ECHO    Hold dc today, likely tomorrow      Jovanni Villarreal MD    Supplementary Documentation:     Quality:  DVT Mechanical  Prophylaxis:   SCDs, Early ambuation  DVT Pharmacologic Prophylaxis   Medication    apixaban (Eliquis) tab 5 mg         DVT Pharmacologic prophylaxis: Aspirin 81 mg      Code Status: Full Code  Garnica: No urinary catheter in place  Garnica Duration (in days):   Central line:    CARMINA: 2/4/2025    Discharge is dependent on: clinical   At this point Mr. Solis is expected to be discharge to: tbd     The 21st Century Cures Act makes medical notes like these available to patients in the interest of transparency. Please be advised this is a medical document. Medical documents are intended to carry relevant information, facts as evident, and the clinical opinion of the practitioner. The medical note is intended as peer to peer communication and may appear blunt or direct. It is written in medical language and may contain abbreviations or verbiage that are unfamiliar.              **Certification      PHYSICIAN Certification of Need for Inpatient Hospitalization - Initial Certification    Patient will require inpatient services that will reasonably be expected to span two midnight's based on the clinical documentation in H+P.   Based on patients current state of illness, I anticipate that, after discharge, patient will require TBD.

## 2025-02-06 LAB
ANION GAP SERPL CALC-SCNC: 7 MMOL/L (ref 0–18)
BASOPHILS # BLD: 0 X10(3) UL (ref 0–0.2)
BASOPHILS NFR BLD: 0 %
BUN BLD-MCNC: 17 MG/DL (ref 9–23)
CALCIUM BLD-MCNC: 8.8 MG/DL (ref 8.7–10.6)
CHLORIDE SERPL-SCNC: 96 MMOL/L (ref 98–112)
CO2 SERPL-SCNC: 30 MMOL/L (ref 21–32)
CREAT BLD-MCNC: 0.66 MG/DL
EGFRCR SERPLBLD CKD-EPI 2021: 91 ML/MIN/1.73M2 (ref 60–?)
EOSINOPHIL # BLD: 0 X10(3) UL (ref 0–0.7)
EOSINOPHIL NFR BLD: 0 %
ERYTHROCYTE [DISTWIDTH] IN BLOOD BY AUTOMATED COUNT: 14.6 %
GLUCOSE BLD-MCNC: 87 MG/DL (ref 70–99)
HCT VFR BLD AUTO: 32.2 %
HGB BLD-MCNC: 11.2 G/DL
LYMPHOCYTES NFR BLD: 1.25 X10(3) UL (ref 1–4)
LYMPHOCYTES NFR BLD: 14 %
MCH RBC QN AUTO: 31.1 PG (ref 26–34)
MCHC RBC AUTO-ENTMCNC: 34.8 G/DL (ref 31–37)
MCV RBC AUTO: 89.4 FL
METAMYELOCYTES # BLD: 0.09 X10(3) UL
METAMYELOCYTES NFR BLD: 1 %
MONOCYTES # BLD: 2.05 X10(3) UL (ref 0.1–1)
MONOCYTES NFR BLD: 23 %
MORPHOLOGY: NORMAL
MYELOCYTES # BLD: 0.09 X10(3) UL
MYELOCYTES NFR BLD: 1 %
NEUTROPHILS # BLD AUTO: 3.88 X10 (3) UL (ref 1.5–7.7)
NEUTROPHILS NFR BLD: 59 %
NEUTS BAND NFR BLD: 2 %
NEUTS HYPERSEG # BLD: 5.43 X10(3) UL (ref 1.5–7.7)
OSMOLALITY SERPL CALC.SUM OF ELEC: 277 MOSM/KG (ref 275–295)
PHOSPHATE SERPL-MCNC: 2.8 MG/DL (ref 2.4–5.1)
PLATELET # BLD AUTO: 171 10(3)UL (ref 150–450)
PLATELET MORPHOLOGY: NORMAL
POTASSIUM SERPL-SCNC: 3.5 MMOL/L (ref 3.5–5.1)
PROCALCITONIN SERPL-MCNC: 1.31 NG/ML (ref ?–0.05)
RBC # BLD AUTO: 3.6 X10(6)UL
SODIUM SERPL-SCNC: 133 MMOL/L (ref 136–145)
TOTAL CELLS COUNTED BLD: 100
WBC # BLD AUTO: 8.9 X10(3) UL (ref 4–11)

## 2025-02-06 PROCEDURE — 99233 SBSQ HOSP IP/OBS HIGH 50: CPT | Performed by: HOSPITALIST

## 2025-02-06 RX ORDER — FUROSEMIDE 10 MG/ML
40 INJECTION INTRAMUSCULAR; INTRAVENOUS
Status: DISCONTINUED | OUTPATIENT
Start: 2025-02-06 | End: 2025-02-07

## 2025-02-06 RX ORDER — FUROSEMIDE 10 MG/ML
40 INJECTION INTRAMUSCULAR; INTRAVENOUS ONCE
Status: COMPLETED | OUTPATIENT
Start: 2025-02-06 | End: 2025-02-06

## 2025-02-06 NOTE — PROGRESS NOTES
INFECTIOUS DISEASE  PROGRESS NOTE            [facility]    Jose Alfredo Solis Patient Status:  Inpatient    1940 MRN [id number]   [facility] 4SW-A Attending Henna Edge MD   Hosp Day # 6 PCP MIKAYLA BAUGH, MD ROSEANN     Antibiotics: #6  Cefazolin #3    Subjective:  Resting on 02 NC    Objective:  Temp:  [98.4 °F (36.9 °C)-99.1 °F (37.3 °C)] 98.6 °F (37 °C)  Pulse:  [] 79  Resp:  [17-] 19  BP: (104-130)/(40-67) 114/67  SpO2:  [90 %-94 %] 92 %    General: awake alert  Vital signs:Temp:  [98.4 °F (36.9 °C)-99.1 °F (37.3 °C)] 98.6 °F (37 °C)  Pulse:  [] 79  Resp:  [17-] 19  BP: (104-130)/(40-67) 114/67  SpO2:  [90 %-94 %] 92 %  HEENT: Moist mucous membranes. Extraocular muscles are intact.  Neck: supple no masses  Respiratory: Non labored, symmetric excursion, normal respirations  Abdomen: Soft, nontender, nondistended.   Musculoskeletal: joints: no swelling   Integument: No lesions. No erythema. No open wounds.  Labs:     COVID-19 Lab Results    COVID-19  Lab Results   Component Value Date    COVID19 Not Detected 2025       Pro-Calcitonin  Recent Labs   Lab 25  0103   PCT 2.47*       Cardiac  Recent Labs   Lab 25  0512   PBNP 5,617*       Creatinine Kinase  No results for input(s): \"CK\" in the last 168 hours.    Inflammatory Markers  No results for input(s): \"CRP\", \"AIRK\", \"LDH\", \"DDIMER\" in the last 168 hours.    Recent Labs   Lab 25  0415   RBC 3.60*   HGB 11.2*   HCT 32.2*   MCV 89.4   MCH 31.1   MCHC 34.8   RDW 14.6   NEPRELIM 3.88   WBC 8.9   .0   NEUT 59   LYMPH 14   MON 23   EOS 0         Recent Labs   Lab 25  0339 25  0420 25  0335 25  0115 25  1436 25   * 137* 125* 103*  --  87   BUN 25* 22 16 19  --  17   CREATSERUM 0.76 0.76 0.66* 0.71  --  0.66*   CA 9.5 8.8 8.8 8.9  --  8.8   ALB 3.5 3.3  --  3.4  --   --     136 134* 133*  --  133*   K 4.0 3.6 3.1* 3.4* 3.6 3.5     103 99 99  --  96*   CO2 22.0 24.0 25.0 25.0  --  30.0   ALKPHO 44* 41*  --  37*  --   --    AST 46* 43*  --  49*  --   --    ALT 33 33  --  25  --   --    BILT 1.0 0.8  --  0.7  --   --    TP 6.0 5.4*  --  5.6*  --   --        No results found for: \"VANCT\"  Microbiology    Hospital Encounter on 01/31/25   1. Blood Culture     Status: None (Preliminary result)    Collection Time: 02/01/25  8:40 AM    Specimen: Blood,peripheral   Result Value Ref Range    Blood Culture Result No Growth 4 Days N/A   2. Urine Culture, Routine     Status: Abnormal    Collection Time: 01/31/25  1:39 AM    Specimen: Urine, clean catch   Result Value Ref Range    Urine Culture >100,000 CFU/ML Escherichia coli (A) N/A       Susceptibility    Escherichia coli -  (no method available)     Ampicillin 4 Sensitive      Cefazolin <=4 Sensitive      Ciprofloxacin <=0.25 Sensitive      Gentamicin <=1 Sensitive      Meropenem <=0.25 Sensitive      Levofloxacin <=0.12 Sensitive      Nitrofurantoin <=16 Sensitive      Piperacillin + Tazobactam <=4 Sensitive      Trimethoprim/Sulfa <=20 Sensitive        Problem list reviewed:  Patient Active Problem List   Diagnosis    Trochanteric bursitis of left hip    Disorientation    Sepsis due to undetermined organism (HCC)    Sepsis with hypotension (HCC)    Hyponatremia    Thrombocytopenia    Metabolic acidosis    Pyelonephritis             ASSESSMENT/PLAN:  1. E coli UTI and bacteremia  -reported slow stream in the past  Currently voiding with external cath  Has been voiding without signifciant residual  CT no stone or hydro    Continue Cefazolin  PO cipro for dc    2. Cardiomyopathy, CHF  Possible component of ALI  Dieuresis per hospialist      3. Gallstones    4. CAD SP CABG/Afib      Ahsan Norton MD, MD  [facility]  [phone]

## 2025-02-06 NOTE — PROGRESS NOTES
[facility]   part of [facility]     Hospitalist Progress Note     Jose Alfredo Solis Patient Status:  Inpatient    1940 MRN [id number]   Location [facility] 4SW-A Attending Henna Edge MD   Hosp Day # 6 PCP MIKAYLA BAUGH, MD ROSEANN     Chief Complaint: Weakness     Subjective:     feels SOB. Still coughing. Back on O2, up to 4L     Objective:    Review of Systems:   A comprehensive review of systems was completed; pertinent positive and negatives stated in subjective.    Vital signs:  Temp:  [98 °F (36.7 °C)-98.9 °F (37.2 °C)] 98.5 °F (36.9 °C)  Pulse:  [] 94  Resp:  [17-] 22  BP: (100-131)/(40-67) 100/54  SpO2:  [90 %-95 %] 95 %    Physical Exam:    General: No acute distress  Respiratory: No wheezes, no rhonchi  Cardiovascular: S1, S2, regular rate and rhythm  Abdomen: Soft, Non-tender, non-distended, positive bowel sounds  Neuro: No new focal deficits.   Extremities: No edema      Diagnostic Data:    Labs:  Recent Labs   Lab 25  0103 25  0605 25  1928 25  0339 25  0340 25  0420 25  0334 25  0115 25  0415   WBC 3.8* 26.0*   < > 17.5*  --  6.8 8.4 10.0 8.9   HGB 13.4 11.4*   < > 11.4*  --  10.9* 10.6* 10.2* 11.2*   MCV 90.8 91.7   < > 89.9  --  89.8 88.8 89.0 89.4   .0* 125.0*   < > 104.0*  --  95.0* 101.0* 108.0* 171.0   BAND 8 11  --   --   --   --   --   --  2   INR 1.87* 1.91*  --   --  1.52* 1.39*  --   --   --     < > = values in this interval not displayed.       Recent Labs   Lab 25  0339 25  0420 25  0335 25  0115 25  1436 25  0415   * 137* 125* 103*  --  87   BUN 25* 22 16 19  --  17   CREATSERUM 0.76 0.76 0.66* 0.71  --  0.66*   CA 9.5 8.8 8.8 8.9  --  8.8   ALB 3.5 3.3  --  3.4  --   --     136 134* 133*  --  133*   K 4.0 3.6 3.1* 3.4* 3.6 3.5    103 99 99  --  96*   CO2 22.0 24.0 25.0 25.0  --  30.0   ALKPHO 44* 41*  --  37*  --   --    AST 46* 43*  --   49*  --   --    ALT 33 33  --  25  --   --    BILT 1.0 0.8  --  0.7  --   --    TP 6.0 5.4*  --  5.6*  --   --        Estimated Glomerular Filtration Rate: 90.8 mL/min/1.73m2 (A) (by CKD-EPI based on SCr of 0.66 mg/dL (L)).    Recent Labs   Lab 01/31/25  0103   TROPHS 13       Recent Labs   Lab 01/31/25  0605 02/01/25  0340 02/02/25  0420   PTP 22.1* 18.5* 17.2*   INR 1.91* 1.52* 1.39*                  Microbiology    Hospital Encounter on 01/31/25   1. Blood Culture     Status: None    Collection Time: 02/01/25  8:40 AM    Specimen: Blood,peripheral   Result Value Ref Range    Blood Culture Result No Growth 5 Days N/A   2. Urine Culture, Routine     Status: Abnormal    Collection Time: 01/31/25  1:39 AM    Specimen: Urine, clean catch   Result Value Ref Range    Urine Culture >100,000 CFU/ML Escherichia coli (A) N/A       Susceptibility    Escherichia coli -  (no method available)     Ampicillin 4 Sensitive      Cefazolin <=4 Sensitive      Ciprofloxacin <=0.25 Sensitive      Gentamicin <=1 Sensitive      Meropenem <=0.25 Sensitive      Levofloxacin <=0.12 Sensitive      Nitrofurantoin <=16 Sensitive      Piperacillin + Tazobactam <=4 Sensitive      Trimethoprim/Sulfa <=20 Sensitive          Imaging: Reviewed in Epic.    Medications:    furosemide  40 mg Intravenous BID (Diuretic)    ceFAZolin  2 g Intravenous Q8H    aspirin  81 mg Oral Daily    atorvastatin  20 mg Oral Nightly    metoprolol succinate ER  25 mg Oral 2x Daily(Beta Blocker)    apixaban  5 mg Oral BID    amiodarone  200 mg Oral Daily    pantoprazole  40 mg Oral QAM AC    tamsulosin  0.4 mg Oral Daily       Assessment & Plan:      #Septic shock due to Ecoli UTI and bacteremia- shock resolved, off pressors  Repeat blood Cx 2/1 NGTD   IV Abx: Ancef   ID on Cs-IV Abx, dc on PO     #dyspnea on 2/4/25 2/2 acute pulmonary edema. Doubt PNA  CXR unchanged  Increase lasix to 40 mg BID IV  BNP elevated  ECHO unremarkable  D/w ID, procal downtrending. Suspect  FOL. Will hold off on expanding abx to cover PNA for now    #Afib, permanent  BB  DOAC, Amio    #h/o CAD s/p CABG  #Hypothyroid  #Hypertension  #HLD    Hold dc until off o2      Jovanni Villarreal MD    Supplementary Documentation:     Quality:  DVT Mechanical Prophylaxis:   SCDs, Early ambuation  DVT Pharmacologic Prophylaxis   Medication    apixaban (Eliquis) tab 5 mg         DVT Pharmacologic prophylaxis: Aspirin 81 mg      Code Status: Full Code  Garnica: External urinary catheter in place  Garnica Duration (in days):   Central line:    CARMINA:     Discharge is dependent on: clinical   At this point Mr. Solis is expected to be discharge to: tbd     The 21st Century Cures Act makes medical notes like these available to patients in the interest of transparency. Please be advised this is a medical document. Medical documents are intended to carry relevant information, facts as evident, and the clinical opinion of the practitioner. The medical note is intended as peer to peer communication and may appear blunt or direct. It is written in medical language and may contain abbreviations or verbiage that are unfamiliar.              **Certification      PHYSICIAN Certification of Need for Inpatient Hospitalization - Initial Certification    Patient will require inpatient services that will reasonably be expected to span two midnight's based on the clinical documentation in H+P.   Based on patients current state of illness, I anticipate that, after discharge, patient will require TBD.

## 2025-02-06 NOTE — SLP NOTE
SPEECH DAILY NOTE - INPATIENT    ASSESSMENT & PLAN   ASSESSMENT  Pt seen for dysphagia tx to assess tolerance with recommended diet, ensure proper utilization of aspiration precautions and provide pt/family education.  Patient received alert and oriented in bed. Oxygenation fluctuating and now on 4L via NC. Patient reported ongoing chronic cough. Compensatory strategies were reviewed and patient exhibited appropriate use during PO trials of thin liquids. No overt s/s of aspiration observed. SLP also provided education re: pharyngeal strengthening exercises. Patient able to complete effortful swallow and Jojo accuractely when provided moderate cues. Written instructions provided and encouraged patient to complete pharyngeal strengthening exercises 2-3x/day. SLP will continue to follow per POC.    Diet Recommendations - Solids: Regular  Diet Recommendations - Liquids: Thin Liquids    Compensatory Strategies Recommended: Alternate consistencies  Aspiration Precautions: Upright position;Small bites;Small sips  Medication Administration Recommendations:  (In puree as necessary)    Patient Experiencing Pain: No                Treatment Plan  Treatment Plan/Recommendations: Dysphagia therapy    Interdisciplinary Communication: Discussed with RN            GOALS  Goal #1 Patient will tolerate soft and bite sized diet with nectar thick liquids, no straws without overt s/s of aspiration with 90% accuracy   Revised   Goal #2 Patient will tolerate PO trials of general solids and thin liquids without overt s/s of aspiration with 90% accuracy      Discharge   Goal #3 Patient will participate in VFSS if continued concerns of aspiration are present and VFSS is clinically necessary   Met   Goal #4 The patient will tolerate regular consistency and thin liquids without overt signs or symptoms of aspiration with 90 % accuracy over 1-2 session(s).     In Progress   Goal #5 The patient will utilize compensatory strategies as outlined  by  VFSS including Multiple swallows, Alternate liquids/solids with mild assistance 90 % of the time across 2 sessions.     In Progress   Goal #6 Patient will complete pharyngeal strengthening exercises with 90% accuracy when provided mild cues within 3 visits.     In Progress   Goal #7       Goal #8            FOLLOW UP  Follow Up Needed (Documentation Required): Yes  SLP Follow-up Date: 02/07/25  Duration: 1 week    Session: 2    If you have any questions, please contact Saw Man SLP

## 2025-02-06 NOTE — PLAN OF CARE
Alert and oriented x4. VSS. On 2L NC. . IS encouraged. Telemetry monitoring. Tolerating diet. Voiding without difficulty. Denies pain at this time. IV abx as ordered. Left arm Kerlix dressing in place, C/D/I. Right arm mepilex C/D/I. Plan dc to [facility] when medically cleared.     Two person skin check completed with VANESSA Mckeon. Sacrum intact no redness. Skin tears to arms, see flowsheets.

## 2025-02-06 NOTE — OCCUPATIONAL THERAPY NOTE
OCCUPATIONAL THERAPY TREATMENT NOTE - INPATIENT     Room Number: 386/386-A  Session: 2   Number of Visits to Meet Established Goals: 4    Presenting Problem: fall, disorientation;  septic shock to UTI    ASSESSMENT   Patient demonstrates good  progress this session, goals remain in progress.    Patient continues to function below baseline with toileting, bathing, lower body dressing, bed mobility, transfers, dynamic standing balance, and functional standing tolerance.   Contributing factors to remaining limitations include decreased functional strength, decreased functional reach, decreased endurance, impaired standing balance, increased O2 needs from baseline, and decreased safety awareness.  Next session anticipate patient to progress toileting, lower body dressing, bed mobility, transfers, dynamic standing balance, and functional standing tolerance.  Occupational Therapy will continue to follow patient for duration of hospitalization.    Patient continues to benefit from continued skilled OT services: to promote return to prior level of function and safety with continuous assistance and gradual rehabilitative therapy.     History Related to Current Admission: Patient is a 85 year old male admitted on 1/31/2025 with Presenting Problem: fall, disorientation;  septic shock to UTI. Co-Morbidities : CVA, CAD, CABG, R TAN 2024, AF, hypothyroidism    WEIGHT BEARING RESTRICTION       Recommendations for nursing staff:   Transfers: 1-person  Toileting location: commode    TREATMENT SESSION:  Patient Start of Session: chair    FUNCTIONAL TRANSFER ASSESSMENT  Sit to Stand: Edge of Bed; Chair  Edge of Bed: Contact Guard Assist  Chair: Contact Guard Assist  Toilet Transfer: Not Tested    BED MOBILITY  Sit to Supine (OT): Supervision    BALANCE ASSESSMENT  Static Standing: Contact Guard Assist  Dynamic Standing: Min Assist    FUNCTIONAL ADL ASSESSMENT  Eating: Modified Independent  Grooming Seated: Supervision (facial hygiene  with setup assist)  LB Dressing Seated: Dependent (total for sock management)  Toileting Seated: Not Tested  Toileting Standing: Not Tested    ACTIVITY TOLERANCE:                          O2 SATURATIONS  Oxygen Therapy  SPO2% on Oxygen at Rest: 95  At rest oxygen flow (liters per minute): 3  SPO2% Ambulation on Oxygen: 91  Ambulation oxygen flow (liters per minute): 3    EDUCATION PROVIDED  Patient Education : Role of Occupational Therapy; Functional Transfer Techniques; Fall Prevention; Posture/Positioning; Energy Conservation  Patient's Response to Education: Verbalized Understanding; Requires Further Education    Equipment used: RW  Demonstrates functional use, Would benefit from additional trial      Therapist comments: Patient motivated and participatory. Patient reinforced on safety precautions and incorporation into ADLs and transfers, followed with fair return demo. Also reviewed energy conservation techniques and pacing, will benefit from continued reinforcement. Functional mobility between chair and edge of bed via RW and min assist with increased time/safety cueing. See Functional Assessment sections above for additional information on patient's performance on ADLs and functional transfers this session. Will continue to follow.     Patient End of Session: In bed;Needs met;Call light within reach;RN aware of session/findings;All patient questions and concerns addressed;Hospital anti-slip socks;Alarm set    SUBJECTIVE  \"I'm a little tired\"    PAIN ASSESSMENT  Ratin  Location: denies        OBJECTIVE  Precautions: Bed/chair alarm    AM-PAC ‘6-Clicks’ Inpatient Daily Activity Short Form  -   Putting on and taking off regular lower body clothing?: A Lot  -   Bathing (including washing, rinsing, drying)?: A Little  -   Toileting, which includes using toilet, bedpan or urinal? : A Little  -   Putting on and taking off regular upper body clothing?: A Little  -   Taking care of personal grooming such as  brushing teeth?: A Little  -   Eating meals?: None    AM-PAC Score:  Score: 18  Approx Degree of Impairment: 46.65%  Standardized Score (AM-PAC Scale): 38.66    PLAN     OT Treatment Plan: Endurance training;UE strengthening/ROM;Functional transfer training;ADL training;Energy conservation/work simplification techniques;Balance activities;Patient/Family training;Patient/Family education;Equipment eval/education;Compensatory technique education  Rehab Potential : Good  Frequency: 3-5x/week    Goals in progress 2/6  ADL Goals   Patient will perform grooming: with supervision and while standing at sink  Patient will perform lower body dressing:  with setup, with supervision, and with adaptive equipment PRN  Patient will perform toileting: with supervision    Functional Transfer Goals  Patient will transfer from sit to supine:  with supervision --> MET 2/6  Patient will transfer from supine to sit:  with supervision  Patient will transfer to toilet:  with supervision    UE Exercise Program Goal  Patient will be supervision with bilateral AROM HEP (home exercise program).    OT Session Time: 25 minutes  Therapeutic Activity: 25 minutes

## 2025-02-06 NOTE — PHYSICAL THERAPY NOTE
PHYSICAL THERAPY TREATMENT NOTE - INPATIENT    Room Number: 386/386-A     Session: 2         Presenting Problem: fall, weakness, confusion  Co-Morbidities : CVA, CAD, CABG, R TAN 2024, AF, hypothyroidism    PHYSICAL THERAPY ASSESSMENT   Patient demonstrates good  progress this session, goals  remain in progress.      Patient is requiring moderate assist as a result of the following impairments: decreased functional strength, decreased endurance/aerobic capacity, impaired standing  balance, impaired coordination, impaired motor planning, decreased muscular endurance, and increased O2 needs from baseline.     Patient continues to function below baseline with transfers, gait, and stair negotiation.  Next session anticipate patient to progress gait.  Physical Therapy will continue to follow patient for duration of hospitalization.    Patient continues to benefit from continued skilled PT services: to promote return to prior level of function and safety with continuous assistance and gradual rehabilitative therapy .    PLAN DURING HOSPITALIZATION  Nursing Mobility Recommendation : 1 Assist  PT Device Recommendation: Rolling walker  PT Treatment Plan: Bed mobility;Patient education;Gait training;Range of motion;Strengthening;Stair training;Transfer training  Frequency (Obs): 3-5x/week     CURRENT GOALS       Goal #1 Patient is able to demonstrate supine - sit EOB @ level: modified independent      Goal #2 Patient is able to demonstrate transfers Sit to/from Stand at assistance level:modified independent      Goal #3 Patient is able to ambulate 300 feet with assist device: walker - rolling at assistance level: modified independent      Goal #4 Patient to be modified independent to ascend/descend 11 stairs step-to pattern with 2 railings   Goal #5     Goal #6     Goal Comments: Goals established on 2/1/2025 2/6/2025 all goals ongoing     SUBJECTIVE  \"I go by Ulises, my middle name, because people weren't too familiar with my  first name\"     OBJECTIVE  Precautions: Bed/chair alarm    WEIGHT BEARING RESTRICTION     PAIN ASSESSMENT   Ratin          BALANCE                                                                                                                       Static Sitting: Fair  Dynamic Sitting: Fair           Static Standing: Poor +  Dynamic Standing: Poor    ACTIVITY TOLERANCE                         O2 WALK       AM-PAC '6-Clicks' INPATIENT SHORT FORM - BASIC MOBILITY  How much difficulty does the patient currently have...  Patient Difficulty: Turning over in bed (including adjusting bedclothes, sheets and blankets)?: A Little   Patient Difficulty: Sitting down on and standing up from a chair with arms (e.g., wheelchair, bedside commode, etc.): A Little   Patient Difficulty: Moving from lying on back to sitting on the side of the bed?: A Little   How much help from another person does the patient currently need...   Help from Another: Moving to and from a bed to a chair (including a wheelchair)?: A Little   Help from Another: Need to walk in hospital room?: A Little   Help from Another: Climbing 3-5 steps with a railing?: Total     AM-PAC Score:  Raw Score: 16   Approx Degree of Impairment: 54.16%   Standardized Score (AM-PAC Scale): 40.78   CMS Modifier (G-Code): CK    FUNCTIONAL ABILITY STATUS  Gait Assessment   Functional Mobility/Gait Assessment  Gait Assistance: Moderate assistance;Minimum assistance  Distance (ft): 45  Assistive Device: Rolling walker  Pattern: Shuffle;R Steppage    Skilled Therapy Provided  Pt presents seated in BS chair   Therapist cued pt for seated therex for BLE pregait strengthening and ROM   Pt with improved balance upon standing with cues for hand placement, and wider ZACK for B feet.  Pt gait trained c RW and demos unsteady gait with difficulty motor planning.   Pt on O2 throughout session .   Educated pt on fall prevention and use of call light .   Bed Mobility:  Rolling:     Supine<>Sit:    Sit<>Supine:      Transfer Mobility:  Sit<>Stand: min A    Stand<>Sit: min A    Gait: mod A  c RW    Therapist's Comments:pt was cued for seated and standing therex and was able to show therapist the antione he was using on  his phone for exercise PTA       THERAPEUTIC EXERCISES  Lower Extremity Alternating marching  Knee extension  LAQ  STS     Upper Extremity      Position Sitting     Repetitions   5,10,20   Sets   2     Patient End of Session: Up in chair;Needs met;Call light within reach;RN aware of session/findings;All patient questions and concerns addressed;Hospital anti-slip socks;Alarm set;SCDs in place    PT Session Time: 28 minutes  Gait Training: 15 minutes  Therapeutic Activity: 13 minutes  Therapeutic Exercise:  minutes   Neuromuscular Re-education:  minutes

## 2025-02-07 LAB
ANION GAP SERPL CALC-SCNC: 6 MMOL/L (ref 0–18)
BUN BLD-MCNC: 18 MG/DL (ref 9–23)
CALCIUM BLD-MCNC: 8.6 MG/DL (ref 8.7–10.6)
CHLORIDE SERPL-SCNC: 94 MMOL/L (ref 98–112)
CO2 SERPL-SCNC: 32 MMOL/L (ref 21–32)
CREAT BLD-MCNC: 0.74 MG/DL
EGFRCR SERPLBLD CKD-EPI 2021: 88 ML/MIN/1.73M2 (ref 60–?)
FLUAV + FLUBV RNA SPEC NAA+PROBE: NEGATIVE
FLUAV + FLUBV RNA SPEC NAA+PROBE: POSITIVE
GLUCOSE BLD-MCNC: 109 MG/DL (ref 70–99)
LACTATE SERPL-SCNC: 1.8 MMOL/L (ref 0.5–2)
LACTATE SERPL-SCNC: 2.3 MMOL/L (ref 0.5–2)
MAGNESIUM SERPL-MCNC: 1.8 MG/DL (ref 1.6–2.6)
OSMOLALITY SERPL CALC.SUM OF ELEC: 276 MOSM/KG (ref 275–295)
POTASSIUM SERPL-SCNC: 3.1 MMOL/L (ref 3.5–5.1)
RSV RNA SPEC NAA+PROBE: NEGATIVE
SARS-COV-2 RNA RESP QL NAA+PROBE: NOT DETECTED
SODIUM SERPL-SCNC: 132 MMOL/L (ref 136–145)

## 2025-02-07 PROCEDURE — 99291 CRITICAL CARE FIRST HOUR: CPT | Performed by: INTERNAL MEDICINE

## 2025-02-07 PROCEDURE — 99233 SBSQ HOSP IP/OBS HIGH 50: CPT | Performed by: HOSPITALIST

## 2025-02-07 RX ORDER — POTASSIUM CHLORIDE 1.5 G/1.58G
40 POWDER, FOR SOLUTION ORAL ONCE
Status: COMPLETED | OUTPATIENT
Start: 2025-02-07 | End: 2025-02-07

## 2025-02-07 RX ORDER — CEPHALEXIN 500 MG/1
500 CAPSULE ORAL 3 TIMES DAILY
Qty: 30 CAPSULE | Refills: 0 | Status: SHIPPED | OUTPATIENT
Start: 2025-02-07 | End: 2025-02-17

## 2025-02-07 RX ORDER — MAGNESIUM OXIDE 400 MG/1
400 TABLET ORAL ONCE
Status: COMPLETED | OUTPATIENT
Start: 2025-02-07 | End: 2025-02-07

## 2025-02-07 RX ORDER — DILTIAZEM HYDROCHLORIDE 5 MG/ML
10 INJECTION INTRAVENOUS ONCE
Status: COMPLETED | OUTPATIENT
Start: 2025-02-07 | End: 2025-02-07

## 2025-02-07 RX ORDER — SODIUM CHLORIDE 9 MG/ML
INJECTION, SOLUTION INTRAVENOUS CONTINUOUS
Status: DISCONTINUED | OUTPATIENT
Start: 2025-02-07 | End: 2025-02-14

## 2025-02-07 RX ORDER — OSELTAMIVIR PHOSPHATE 75 MG/1
75 CAPSULE ORAL 2 TIMES DAILY
Status: COMPLETED | OUTPATIENT
Start: 2025-02-07 | End: 2025-02-11

## 2025-02-07 NOTE — PROGRESS NOTES
INFECTIOUS DISEASE  PROGRESS NOTE            [facility]    Jose Alfredo ANDREA Solis Patient Status:  Inpatient    1940 MRN [id number]   Formerly [facility] 4SW-A Attending Henna Edge MD   Hosp Day # 7 PCP MIKAYLA BAUGH, MD ROSEANN     Antibiotics: #7  Cefazolin #4    Subjective:  Comfortable  Dry cough  On     Objective:  Temp:  [98 °F (36.7 °C)-98.9 °F (37.2 °C)] 98.4 °F (36.9 °C)  Pulse:  [69-94] 88  Resp:  [18-22] 18  BP: ()/(45-56) 114/52  SpO2:  [88 %-98 %] 96 %    General: awake alert  Vital signs:Temp:  [98 °F (36.7 °C)-98.9 °F (37.2 °C)] 98.4 °F (36.9 °C)  Pulse:  [69-94] 88  Resp:  [18-22] 18  BP: ()/(45-56) 114/52  SpO2:  [88 %-98 %] 96 %  HEENT: Moist mucous membranes. Extraocular muscles are intact.  Neck: supple no masses  Respiratory: Non labored, symmetric excursion, normal respirations  Abdomen: Soft, nontender, nondistended.   Musculoskeletal: joints: no swelling   Integument: No lesions. No erythema. No open wounds.  Labs:     COVID-19 Lab Results    COVID-19  Lab Results   Component Value Date    COVID19 Not Detected 2025       Pro-Calcitonin  Recent Labs   Lab 25  0415   PCT 1.31*       Cardiac  Recent Labs   Lab 25  0512   PBNP 5,617*       Creatinine Kinase  No results for input(s): \"CK\" in the last 168 hours.    Inflammatory Markers  No results for input(s): \"CRP\", \"ARIK\", \"LDH\", \"DDIMER\" in the last 168 hours.    Recent Labs   Lab 25  0415   RBC 3.60*   HGB 11.2*   HCT 32.2*   MCV 89.4   MCH 31.1   MCHC 34.8   RDW 14.6   NEPRELIM 3.88   WBC 8.9   .0   NEUT 59   LYMPH 14   MON 23   EOS 0         Recent Labs   Lab 25  0339 25  0420 25  0335 25  0115 25  1436 25  0415 25  0509   * 137*   < > 103*  --  87 109*   BUN 25* 22   < > 19  --  17 18   CREATSERUM 0.76 0.76   < > 0.71  --  0.66* 0.74   CA 9.5 8.8   < > 8.9  --  8.8 8.6*   ALB 3.5 3.3  --  3.4  --   --   --     136   < >  133*  --  133* 132*   K 4.0 3.6   < > 3.4* 3.6 3.5 3.1*    103   < > 99  --  96* 94*   CO2 22.0 24.0   < > 25.0  --  30.0 32.0   ALKPHO 44* 41*  --  37*  --   --   --    AST 46* 43*  --  49*  --   --   --    ALT 33 33  --  25  --   --   --    BILT 1.0 0.8  --  0.7  --   --   --    TP 6.0 5.4*  --  5.6*  --   --   --     < > = values in this interval not displayed.       No results found for: \"VANCT\"  Microbiology    Hospital Encounter on 01/31/25   1. Blood Culture     Status: None    Collection Time: 02/01/25  8:40 AM    Specimen: Blood,peripheral   Result Value Ref Range    Blood Culture Result No Growth 5 Days N/A   2. Urine Culture, Routine     Status: Abnormal    Collection Time: 01/31/25  1:39 AM    Specimen: Urine, clean catch   Result Value Ref Range    Urine Culture >100,000 CFU/ML Escherichia coli (A) N/A       Susceptibility    Escherichia coli -  (no method available)     Ampicillin 4 Sensitive      Cefazolin <=4 Sensitive      Ciprofloxacin <=0.25 Sensitive      Gentamicin <=1 Sensitive      Meropenem <=0.25 Sensitive      Levofloxacin <=0.12 Sensitive      Nitrofurantoin <=16 Sensitive      Piperacillin + Tazobactam <=4 Sensitive      Trimethoprim/Sulfa <=20 Sensitive        Problem list reviewed:  Patient Active Problem List   Diagnosis    Trochanteric bursitis of left hip    Disorientation    Sepsis due to undetermined organism (HCC)    Sepsis with hypotension (HCC)    Hyponatremia    Thrombocytopenia    Metabolic acidosis    Pyelonephritis       Evaluation process:    Evaluation involves complex antimicrobial therapy, counseling, and treatment such as decisions to institute, withdrawal, or adjust antibiotic therapy, and or educational discussions with patient, family, and collaborating treatment team          ASSESSMENT/PLAN:  1. E coli UTI and bacteremia  -reported slow stream in the past  Currently voiding with external cath  Has been voiding without signifciant residual  CT no stone or  hydro    Continue Cefazolin  PO cephalexin for dc  ---cipro interacts with amiodarone    2. Cardiomyopathy, CHF  Possible component of ALI  Dieuresis per hospialist      3. Gallstones    4. CAD SP CABG/Afib    Addendum 6:28pm: recurrent fever and respiratory symptoms, repeat culture ordered  Was already switched to zosyn prior today      Ahsan Norton MD, MD [facility]  [phone]

## 2025-02-07 NOTE — CONSULTS
[facility]  Pulmonary/Critical Care Consult note    Jose Alfredo Solis Patient Status:  Inpatient    1940 MRN [id number]   Location [facility] 3SW-A Attending Jovanni Villarreal MD   Hosp Day # 7 PCP MIKAYLA BAUGH, MD ROSEANN     Reason for Consultation:  Shortness of breath and hypoxia    History of Present Illness:  Jose Alfredo Solis is a a(n) 85 year old male with history of hypertension, hyperlipidemia, hypothyroidism, CVA,, atrial fibrillation, coronary artery disease status post CABG.      The patient was admitted on 2025 with septic shock due to UTI with E. coli.  The patient was admitted to ICU and later transferred to the floor.    Today the patient was noted to be dyspneic and hypoxic so pulmonary consultation was obtained for further evaluation and management..  The patient admits to symptoms of shortness of breath at rest.  He has required supplemental oxygen at 3 L/min by nasal cannula.  He is currently in atrial fibrillation with rapid ventricular response so he was transferred to the  ICU.  The patient denies any chest pains, abdominal pains or weakness  .  The patient was admitted on 2025.  SinceHistory:  Past Medical History:    High cholesterol    Hypertension    Hypothyroid    Stroke (HCC)     Past Surgical History:   Procedure Laterality Date    Cabg      Other surgical history      prostate surgery     Family History   Problem Relation Age of Onset    Other (Other) Father         leukemia      reports that he quit smoking about 56 years ago. He has never used smokeless tobacco. He reports current alcohol use. He reports that he does not use drugs.    Allergies:  Allergies[1]    Medications:    Current Facility-Administered Medications:     [Held by provider] furosemide (Lasix) 10 mg/mL injection 40 mg, 40 mg, Intravenous, BID (Diuretic)    metoclopramide (Reglan) 5 mg/mL injection 10 mg, 10 mg, Intravenous, Q8H PRN    ceFAZolin (Ancef) 2g in 10mL IV syringe premix, 2  g, Intravenous, Q8H    aspirin DR tab 81 mg, 81 mg, Oral, Daily    atorvastatin (Lipitor) tab 20 mg, 20 mg, Oral, Nightly    metoprolol succinate ER (Toprol XL) 24 hr tab 25 mg, 25 mg, Oral, 2x Daily(Beta Blocker)    apixaban (Eliquis) tab 5 mg, 5 mg, Oral, BID    acetaminophen (Tylenol Extra Strength) tab 1,000 mg, 1,000 mg, Oral, Q8H PRN    melatonin tab 3 mg, 3 mg, Oral, Nightly PRN    polyethylene glycol (PEG 3350) (Miralax) 17 g oral packet 17 g, 17 g, Oral, Daily PRN    sennosides (Senokot) tab 17.2 mg, 17.2 mg, Oral, Nightly PRN    bisacodyl (Dulcolax) 10 MG rectal suppository 10 mg, 10 mg, Rectal, Daily PRN    ondansetron (Zofran) 4 MG/2ML injection 4 mg, 4 mg, Intravenous, Q6H PRN    benzonatate (Tessalon) cap 200 mg, 200 mg, Oral, TID PRN    guaiFENesin (Robitussin) 100 MG/5 ML oral liquid 200 mg, 200 mg, Oral, Q4H PRN    glycerin-hypromellose- (Artificial Tears) 0.2-0.2-1 % ophthalmic solution 1 drop, 1 drop, Both Eyes, QID PRN    sodium chloride (Saline Mist) 0.65 % nasal solution 1 spray, 1 spray, Each Nare, Q3H PRN    amiodarone (Pacerone) tab 200 mg, 200 mg, Oral, Daily    pantoprazole (Protonix) DR tab 40 mg, 40 mg, Oral, QAM AC    tamsulosin (Flomax) cap 0.4 mg, 0.4 mg, Oral, Daily    Intake/Output:    Intake/Output Summary (Last 24 hours) at 2/7/2025 1452  Last data filed at 2/7/2025 1138  Gross per 24 hour   Intake 597 ml   Output 1600 ml   Net -1003 ml      Body mass index is 23.9 kg/m².    Review of Systems  Review of Systems:   A comprehensive 10 point review of systems was completed.  Pertinent positives and negatives noted in the the HPI.         Patient Vitals for the past 24 hrs:   BP Temp Temp src Pulse Resp SpO2 Weight   02/07/25 1138 114/52 98.4 °F (36.9 °C) Oral 88 18 96 % --   02/07/25 0823 97/45 98.6 °F (37 °C) Oral 72 20 98 % --   02/07/25 0553 -- -- -- -- -- -- 157 lb 3 oz (71.3 kg)   02/07/25 0530 122/54 98.9 °F (37.2 °C) Oral 79 18 93 % --   02/06/25 2300 120/48 98 °F (36.7  °C) Oral 69 20 93 % --   02/06/25 2156 -- -- -- 91 -- (!) 88 % --   02/06/25 1917 134/56 98.9 °F (37.2 °C) Oral 75 22 93 % --   02/06/25 1534 100/54 98.5 °F (36.9 °C) Oral 94 22 95 % --     Vitals:    02/07/25 0530 02/07/25 0553 02/07/25 0823 02/07/25 1138   BP: 122/54  97/45 114/52   BP Location: Right arm  Right arm Right arm   Pulse: 79  72 88   Resp: 18  20 18   Temp: 98.9 °F (37.2 °C)  98.6 °F (37 °C) 98.4 °F (36.9 °C)   TempSrc: Oral  Oral Oral   SpO2: 93%  98% 96%   Weight:  157 lb 3 oz (71.3 kg)     Height:             Physical Exam  Constitutional:       General: He is not in acute distress.     Appearance: Normal appearance. He is not ill-appearing or diaphoretic.   HENT:      Head: Normocephalic and atraumatic.      Nose: Nose normal. No congestion or rhinorrhea.      Mouth/Throat:      Mouth: Mucous membranes are moist.      Pharynx: Oropharynx is clear. No oropharyngeal exudate or posterior oropharyngeal erythema.      Comments: Mallampati class III palate   Eyes:      Extraocular Movements: Extraocular movements intact.      Pupils: Pupils are equal, round, and reactive to light.   Cardiovascular:      Rate and Rhythm: Normal rate.      Pulses: Normal pulses.      Heart sounds: Normal heart sounds. No murmur heard.  Pulmonary:      Effort: Pulmonary effort is normal. No respiratory distress.      Breath sounds: Normal breath sounds. No wheezing or rhonchi.   Chest:      Chest wall: No tenderness.   Abdominal:      General: Abdomen is flat. Bowel sounds are normal.      Palpations: Abdomen is soft.   Musculoskeletal:         General: Normal range of motion.   Skin:     General: Skin is warm.   Neurological:      General: No focal deficit present.      Mental Status: He is alert and oriented to person, place, and time.   Psychiatric:         Mood and Affect: Mood normal.         Behavior: Behavior normal.         Thought Content: Thought content normal.         Judgment: Judgment normal.            Lab  Data Review:  Recent Labs   Lab 02/03/25  0334 02/04/25  0115 02/06/25  0415   WBC 8.4 10.0 8.9   HGB 10.6* 10.2* 11.2*   HCT 30.1* 29.1* 32.2*   .0* 108.0* 171.0   EOS  --   --  0       Recent Labs   Lab 02/01/25  0339 02/02/25  0420 02/03/25  0335 02/04/25  0115 02/04/25  1436 02/06/25  0415 02/07/25  0509    136   < > 133*  --  133* 132*   K 4.0 3.6   < > 3.4* 3.6 3.5 3.1*    103   < > 99  --  96* 94*   CO2 22.0 24.0   < > 25.0  --  30.0 32.0   BUN 25* 22   < > 19  --  17 18   CREATSERUM 0.76 0.76   < > 0.71  --  0.66* 0.74   CA 9.5 8.8   < > 8.9  --  8.8 8.6*   ALB 3.5 3.3  --  3.4  --   --   --    ALKPHO 44* 41*  --  37*  --   --   --    ALT 33 33  --  25  --   --   --    AST 46* 43*  --  49*  --   --   --    * 137*   < > 103*  --  87 109*    < > = values in this interval not displayed.       Recent Labs   Lab 02/04/25 0115 02/05/25  0512 02/07/25  0509   MG 1.7 2.1 1.8       Lab Results   Component Value Date    PHOS 2.8 02/06/2025        Recent Labs   Lab 02/01/25  0340 02/02/25  0420   INR 1.52* 1.39*       No results for input(s): \"ABGPHT\", \"GYKHSJ5C\", \"XXLKH7O\", \"ABGHCO3\", \"ABGBE\", \"TEMP\", \"SIVAKUMAR\", \"SITE\", \"DEV\", \"THGB\" in the last 168 hours.    No results for input(s): \"TROP\", \"CKMB\" in the last 168 hours.    Cultures:   Hospital Encounter on 01/31/25   1. Blood Culture     Status: None    Collection Time: 02/01/25  8:40 AM    Specimen: Blood,peripheral   Result Value Ref Range    Blood Culture Result No Growth 5 Days N/A   2. Urine Culture, Routine     Status: Abnormal    Collection Time: 01/31/25  1:39 AM    Specimen: Urine, clean catch   Result Value Ref Range    Urine Culture >100,000 CFU/ML Escherichia coli (A) N/A       Susceptibility    Escherichia coli -  (no method available)     Ampicillin 4 Sensitive      Cefazolin <=4 Sensitive      Ciprofloxacin <=0.25 Sensitive      Gentamicin <=1 Sensitive      Meropenem <=0.25 Sensitive      Levofloxacin <=0.12 Sensitive       Nitrofurantoin <=16 Sensitive      Piperacillin + Tazobactam <=4 Sensitive      Trimethoprim/Sulfa <=20 Sensitive            Radiology personally reviewed:  XR CHEST AP PORTABLE  (CPT=71045)    Result Date: 2/5/2025  CONCLUSION:  Postoperative changes of cardiothoracic surgery with stable cardiac and mediastinal contours.  Stable positioning of left chest wall ICD.  There continues to be a perihilar distribution of interstitial opacities with increasing patchy alveolar component of the left upper lobe.  Pulmonary edema is favored, though an infectious/inflammatory process could have a similar appearance.  No associated pleural effusion or pneumothorax.   LOCATION:  [facility]      Dictated by (CST): Lamar Mendes MD on 2/05/2025 at 3:06 PM     Finalized by (CST): Lamar Mendes MD on 2/05/2025 at 3:08 PM         Patient Active Problem List   Diagnosis    Trochanteric bursitis of left hip    Disorientation    Sepsis due to undetermined organism (HCC)    Sepsis with hypotension (HCC)    Hyponatremia    Thrombocytopenia    Metabolic acidosis    Pyelonephritis       Assessment:  Acute hypoxic respiratory failure: Requiring supplemental oxygen 3 L/min flow.  Suspect due to pulmonary edema with A-fib RVR, versus influenza A and left lower lobe atelectasis  Left lower lobe atelectasis on chest x-ray 2/6/2025  E. coli UTI sepsis with  septic shock recently requiring vasopressors on 1/31/2025 now off  Chronic atrial fibrillation with rapid ventricular response and heart rate of 120s, on chronic anticoagulation  Hypertension  Hyperlipidemia  Hypothyroidism  Coronary artery disease status post CABG  History of CVA with no deficits  BPH        Plan:  Wean FiO2 to keep oxygen saturation between 90 to 92%  Transferred to see in ICU to initiate Cardizem drip  Follow-up chest x-ray  Continue Tamiflu 75 mg oral twice daily for 5 days  Infectious disease follow-up  Zosyn 3.375 g IV every 8 hours per infectious disease  DVT prophylaxis: SCD  boots  /   heparin 5000 units subcutaneous every 12 hours  /  Lovenox 40 mg subcutaneous every 24 hours  GI prophylaxis: Protonix 40 mg daily /  Pepcid 20 mg oral twice daily  Will follow for further recommendations  Follow labs and chest x-ray    Thank You for allowing me to participate in this patient's care     Justin Arechiga MD    Spent 39   minutes of Critical Care time (exclusive of billable procedures) in evaluation, management and complex clinical decision making.   The patient is critically ill and high risk for deterioration . This also included (> 50% face-to-face time) extensive discussion with the patient, family, and clinical staff for care co-ordination      Justin Arechiga MD      Note to the patient: The 21st Century Cures Act makes medical notes like these available to patients in the interest of transparency. However, be advised that this is a medical document. It is intended as peer to peer communication. It is written in medical language and may contain abbreviations or verbiage that are unfamiliar. It may appear blunt or direct. Medical documents are intended to carry relevant information, facts as evident, and clinical opinion of the practitioner.      Disclaimer: Components of this note were documented using voice recognition system and are subject to errors not corrected at proofreading. Contact the author of this note for any clarifications         [1] No Known Allergies

## 2025-02-07 NOTE — PLAN OF CARE
Alert and oriented x 4, forgetful at times. Vitals stable on 3 L O2, telemetry monitoring, A fib, controlled rate. Denies pain . Voiding without difficulty via external male catheter, on IV  Lasix . Tolerating regular diet. Tolerating IV antibiotic. SCD's on bilaterally. Safety precautions in place. Aspirations precautions. Pulmonology to see. Plan to continue IV antibiotic, wean oxygen as tolerated. Plan discussed with patient.     1700 Patient in Afib RVR, new orders for IV Cardizem 10 mg , transfer to cardiac unit for Cardizem ggt titration.   1738 Patient temp 101.7, /76. BPA for sepsis, MD aware, new orders placed.

## 2025-02-07 NOTE — CM/SW NOTE
Pt discussed in rounds today. Pt on IV lasix due to pulm edema.  Pt needing 3.5 L O2.  No dc today-  updates sent to [facility].

## 2025-02-07 NOTE — PROGRESS NOTES
[facility]   part of [facility]     Hospitalist Progress Note     Jose Alfredo Solis Patient Status:  Inpatient    1940 MRN [id number]   Location [facility] 4SW-A Attending Henna Edge MD   Hosp Day # 7 PCP MIKAYLA BAUGH, MD ROSEANN     Chief Complaint: Weakness     Subjective:     feels SOB. Still coughing. Still on 4L, diuresed well but no improvement in resp status. BP dropping transiently     Objective:    Review of Systems:   A comprehensive review of systems was completed; pertinent positive and negatives stated in subjective.    Vital signs:  Temp:  [98 °F (36.7 °C)-98.9 °F (37.2 °C)] 98.4 °F (36.9 °C)  Pulse:  [69-94] 88  Resp:  [18-22] 18  BP: ()/(45-56) 114/52  SpO2:  [88 %-98 %] 96 %    Physical Exam:    General: No acute distress  Respiratory: No wheezes, no rhonchi, congested  Cardiovascular: S1, S2, regular rate and rhythm  Abdomen: Soft, Non-tender, non-distended, positive bowel sounds  Neuro: No new focal deficits.   Extremities: No edema      Diagnostic Data:    Labs:  Recent Labs   Lab 25  03325  0415   WBC 17.5*  --  6.8 8.4 10.0 8.9   HGB 11.4*  --  10.9* 10.6* 10.2* 11.2*   MCV 89.9  --  89.8 88.8 89.0 89.4   .0*  --  95.0* 101.0* 108.0* 171.0   BAND  --   --   --   --   --  2   INR  --  1.52* 1.39*  --   --   --        Recent Labs   Lab 25  01125  1436 25  0415 25  0509   * 137*   < > 103*  --  87 109*   BUN 25* 22   < > 19  --  17 18   CREATSERUM 0.76 0.76   < > 0.71  --  0.66* 0.74   CA 9.5 8.8   < > 8.9  --  8.8 8.6*   ALB 3.5 3.3  --  3.4  --   --   --     136   < > 133*  --  133* 132*   K 4.0 3.6   < > 3.4* 3.6 3.5 3.1*    103   < > 99  --  96* 94*   CO2 22.0 24.0   < > 25.0  --  30.0 32.0   ALKPHO 44* 41*  --  37*  --   --   --    AST 46* 43*  --  49*  --   --   --    ALT 33 33  --  25  --    --   --    BILT 1.0 0.8  --  0.7  --   --   --    TP 6.0 5.4*  --  5.6*  --   --   --     < > = values in this interval not displayed.       Estimated Glomerular Filtration Rate: 87.7 mL/min/1.73m2 (by CKD-EPI based on SCr of 0.74 mg/dL).    No results for input(s): \"TROP\", \"TROPHS\", \"CK\" in the last 168 hours.      Recent Labs   Lab 02/01/25  0340 02/02/25  0420   PTP 18.5* 17.2*   INR 1.52* 1.39*                  Microbiology    Hospital Encounter on 01/31/25   1. Blood Culture     Status: None    Collection Time: 02/01/25  8:40 AM    Specimen: Blood,peripheral   Result Value Ref Range    Blood Culture Result No Growth 5 Days N/A   2. Urine Culture, Routine     Status: Abnormal    Collection Time: 01/31/25  1:39 AM    Specimen: Urine, clean catch   Result Value Ref Range    Urine Culture >100,000 CFU/ML Escherichia coli (A) N/A       Susceptibility    Escherichia coli -  (no method available)     Ampicillin 4 Sensitive      Cefazolin <=4 Sensitive      Ciprofloxacin <=0.25 Sensitive      Gentamicin <=1 Sensitive      Meropenem <=0.25 Sensitive      Levofloxacin <=0.12 Sensitive      Nitrofurantoin <=16 Sensitive      Piperacillin + Tazobactam <=4 Sensitive      Trimethoprim/Sulfa <=20 Sensitive          Imaging: Reviewed in Epic.    Medications:    [Held by provider] furosemide  40 mg Intravenous BID (Diuretic)    ceFAZolin  2 g Intravenous Q8H    aspirin  81 mg Oral Daily    atorvastatin  20 mg Oral Nightly    metoprolol succinate ER  25 mg Oral 2x Daily(Beta Blocker)    apixaban  5 mg Oral BID    amiodarone  200 mg Oral Daily    pantoprazole  40 mg Oral QAM AC    tamsulosin  0.4 mg Oral Daily       Assessment & Plan:      #Septic shock due to Ecoli UTI and bacteremia- shock resolved, off pressors  Repeat blood Cx 2/1 NGTD   IV Abx: Ancef   ID on Cs-IV Abx, dc on PO     #dyspnea on 2/4/25 2/2 acute pulmonary edema vs PNA  CXR unchanged, fluid vs PNA  Diuresed well w/o much improvement. Hold lasix w/ dropping  BP's and will redose daily   BNP elevated  Cont to try to wean O2  ECHO unremarkable  D/w ID, procal downtrending. Suspect FOL. Will hold off on expanding abx to cover PNA for now  W/o no improvement, will consult pulm. ALI? Amio toxicity?    #Afib, permanent  BB  DOAC, Amio    #h/o CAD s/p CABG  #Hypothyroid  #Hypertension  #HLD    Hold dc until off o2    Jovanni Villarreal MD    ADDENDUM:  Pt flipped into afib RVR, rates 130-140's  Start cardizem gtt  Consult cards  May need some IVF back, overdiuresed?  Transfer to tele    Jovanni Villarreal MD    ADDENDUM:  Spiked fever. Reeval.  Possible sepsis w/ fever and tachycardia  check lactic stat, start IVF, bolus if hypotensive or elevated lactic  Repeat bcxs  D/w ID, expand abx to zosyn, possible PNA    Jovanni Villarreal MD    Supplementary Documentation:     Quality:  DVT Mechanical Prophylaxis:   SCDs, Early ambuation  DVT Pharmacologic Prophylaxis   Medication    apixaban (Eliquis) tab 5 mg         DVT Pharmacologic prophylaxis: Aspirin 81 mg      Code Status: Full Code  Garnica: External urinary catheter in place  Garnica Duration (in days):   Central line:    CARMINA:     Discharge is dependent on: clinical   At this point Mr. Solis is expected to be discharge to: tbd     The 21st Century Cures Act makes medical notes like these available to patients in the interest of transparency. Please be advised this is a medical document. Medical documents are intended to carry relevant information, facts as evident, and the clinical opinion of the practitioner. The medical note is intended as peer to peer communication and may appear blunt or direct. It is written in medical language and may contain abbreviations or verbiage that are unfamiliar.              **Certification      PHYSICIAN Certification of Need for Inpatient Hospitalization - Initial Certification    Patient will require inpatient services that will reasonably be expected to span two midnight's based on the clinical documentation  in H+P.   Based on patients current state of illness, I anticipate that, after discharge, patient will require TBD.

## 2025-02-07 NOTE — PLAN OF CARE
Aox4, no distress noted, on 4L , HOB >30 to aid in respirations and cough, IS highly encouraged, denying pain, aspiration precautions, on tele a-fib controlled rate, scds in place, on Eliquis, on Ancef Q8, voiding via Primofit, up SBA with RW, plan to wean O2 as tolerated, eventual dc to [facility] when cleared, no further needs at this time.

## 2025-02-08 ENCOUNTER — APPOINTMENT (OUTPATIENT)
Dept: GENERAL RADIOLOGY | Facility: HOSPITAL | Age: 85
End: 2025-02-08
Attending: INTERNAL MEDICINE
Payer: MEDICARE

## 2025-02-08 LAB
ALBUMIN SERPL-MCNC: 3.2 G/DL (ref 3.2–4.8)
ALBUMIN/GLOB SERPL: 1.3 {RATIO} (ref 1–2)
ALP LIVER SERPL-CCNC: 47 U/L
ALT SERPL-CCNC: 11 U/L
ANION GAP SERPL CALC-SCNC: 9 MMOL/L (ref 0–18)
AST SERPL-CCNC: 65 U/L (ref ?–34)
BASOPHILS # BLD: 0 X10(3) UL (ref 0–0.2)
BASOPHILS NFR BLD: 0 %
BILIRUB SERPL-MCNC: 1.4 MG/DL (ref 0.2–1.1)
BUN BLD-MCNC: 22 MG/DL (ref 9–23)
CALCIUM BLD-MCNC: 8.3 MG/DL (ref 8.7–10.6)
CHLORIDE SERPL-SCNC: 96 MMOL/L (ref 98–112)
CO2 SERPL-SCNC: 29 MMOL/L (ref 21–32)
CREAT BLD-MCNC: 0.78 MG/DL
EGFRCR SERPLBLD CKD-EPI 2021: 86 ML/MIN/1.73M2 (ref 60–?)
EOSINOPHIL # BLD: 0 X10(3) UL (ref 0–0.7)
EOSINOPHIL NFR BLD: 0 %
ERYTHROCYTE [DISTWIDTH] IN BLOOD BY AUTOMATED COUNT: 14.6 %
GLOBULIN PLAS-MCNC: 2.4 G/DL (ref 2–3.5)
GLUCOSE BLD-MCNC: 155 MG/DL (ref 70–99)
HCT VFR BLD AUTO: 29.8 %
HGB BLD-MCNC: 10.3 G/DL
LYMPHOCYTES NFR BLD: 1.45 X10(3) UL (ref 1–4)
LYMPHOCYTES NFR BLD: 8 %
MAGNESIUM SERPL-MCNC: 1.8 MG/DL (ref 1.6–2.6)
MCH RBC QN AUTO: 30.7 PG (ref 26–34)
MCHC RBC AUTO-ENTMCNC: 34.6 G/DL (ref 31–37)
MCV RBC AUTO: 88.7 FL
MONOCYTES # BLD: 1.63 X10(3) UL (ref 0.1–1)
MONOCYTES NFR BLD: 9 %
MORPHOLOGY: NORMAL
NEUTROPHILS # BLD AUTO: 13.08 X10 (3) UL (ref 1.5–7.7)
NEUTROPHILS NFR BLD: 73 %
NEUTS BAND NFR BLD: 10 %
NEUTS HYPERSEG # BLD: 15.02 X10(3) UL (ref 1.5–7.7)
NRBC BLD MANUAL-RTO: 1 %
OSMOLALITY SERPL CALC.SUM OF ELEC: 284 MOSM/KG (ref 275–295)
PLATELET # BLD AUTO: 222 10(3)UL (ref 150–450)
PLATELET MORPHOLOGY: NORMAL
POTASSIUM SERPL-SCNC: 3.4 MMOL/L (ref 3.5–5.1)
POTASSIUM SERPL-SCNC: 3.4 MMOL/L (ref 3.5–5.1)
PROT SERPL-MCNC: 5.6 G/DL (ref 5.7–8.2)
RBC # BLD AUTO: 3.36 X10(6)UL
SODIUM SERPL-SCNC: 134 MMOL/L (ref 136–145)
TOTAL CELLS COUNTED BLD: 100
WBC # BLD AUTO: 18.1 X10(3) UL (ref 4–11)

## 2025-02-08 PROCEDURE — 71045 X-RAY EXAM CHEST 1 VIEW: CPT | Performed by: INTERNAL MEDICINE

## 2025-02-08 PROCEDURE — 99233 SBSQ HOSP IP/OBS HIGH 50: CPT | Performed by: INTERNAL MEDICINE

## 2025-02-08 PROCEDURE — 99232 SBSQ HOSP IP/OBS MODERATE 35: CPT | Performed by: HOSPITALIST

## 2025-02-08 RX ORDER — MAGNESIUM OXIDE 400 MG/1
400 TABLET ORAL ONCE
Status: COMPLETED | OUTPATIENT
Start: 2025-02-08 | End: 2025-02-08

## 2025-02-08 RX ORDER — CODEINE PHOSPHATE AND GUAIFENESIN 10; 100 MG/5ML; MG/5ML
10 SOLUTION ORAL EVERY 4 HOURS PRN
Status: DISCONTINUED | OUTPATIENT
Start: 2025-02-08 | End: 2025-02-14

## 2025-02-08 RX ORDER — METOPROLOL SUCCINATE 50 MG/1
50 TABLET, EXTENDED RELEASE ORAL
Status: DISCONTINUED | OUTPATIENT
Start: 2025-02-08 | End: 2025-02-14

## 2025-02-08 RX ORDER — POTASSIUM CHLORIDE 1.5 G/1.58G
40 POWDER, FOR SOLUTION ORAL ONCE
Status: COMPLETED | OUTPATIENT
Start: 2025-02-08 | End: 2025-02-08

## 2025-02-08 NOTE — DIETARY NOTE
[facility]   part of [facility]   CLINICAL NUTRITION    Jose Alfredo Solis     Admitting diagnosis:  Hyponatremia [E87.1]  Metabolic acidosis [E87.20]  Pyelonephritis [N12]  Thrombocytopenia [D69.6]  Disorientation [R41.0]  Sepsis due to undetermined organism (HCC) [A41.9]  Sepsis with hypotension (HCC) [A41.9, I95.9]    Ht: 172.7 cm (5' 8\")  Wt: 71.3 kg (157 lb 3 oz).   Body mass index is 23.9 kg/m².  IBW: 70 kg    Wt Readings from Last 6 Encounters:   25 71.3 kg (157 lb 3 oz)   18 72.6 kg (160 lb)        Labs/Meds reviewed  -Glu:109, Na++:132, K+:3.1, M.8  -Kcl:40 meq, IV abx, Lasix, Mag Ox    Diet:       Procedures    Regular/General diet Fluid Consistency: Thin Liquids ; Texture Consistency: Regular; Is Patient on Accuchecks? No     Percent Meals Eaten (last 3 days)       Date/Time Percent Meals Eaten (%)    25 0838 100 %    25 0830 100 %    25 0954 75 %    25 1534 100 %    25 0823 75 %          Pt chart reviewed d/t length of stay. Pt admitted w/ weakness, confusion. Found to be in septic shock 2/2 UTI.  Pt in the process of transferring to cardiac floor d/t need for cardizem gtt.     Recorded PO intakes vary %.  Nursing notes reports Percent Meals Eaten (%): 75 % intake for last meal.  Will add Ensure Plus High Protein daily at breakfast to help maximize nutrition intakes. Diet upgraded from Soft & Bite Sized w/ Nectar Thick/Mildly Thick Liquids to Regular w/ Thin Liquids after VFSS on 2/3.  Last BM:. Wound to B/L arms and L knee.   No significant weight changes noted per EMR hx.    PMH:HTN, HLD, CAD, CABG, CVA, hypothyroidism, remote tobacco use.    Patient is at low nutrition risk at this time.    Please consult if patient status changes or nutrition issues arise.    Jessica Marshall MS, RD, LDN  Clinical Dietitian  Ext:[phone]

## 2025-02-08 NOTE — PROGRESS NOTES
[facility]  Pulmonary/Critical Care Progress  note    Jose Alfredo Solis Patient Status:  Inpatient    1940 MRN [id number]   Location [facility] 3SW-A Attending Jovanni Villarreal MD   Hosp Day # 8 PCP MIKAYLA BAUGH, MD ROSEANN     History of Present Illness:  Feels much better today sitting up in chair.  Atrial fibrillation improved with good rate control and Cardizem drip stopped.  .  The patient was admitted on 2025.  SinceHistory:  Past Medical History:    High cholesterol    Hypertension    Hypothyroid    Stroke (HCC)     Past Surgical History:   Procedure Laterality Date    Cabg      Other surgical history      prostate surgery     Family History   Problem Relation Age of Onset    Other (Other) Father         leukemia      reports that he quit smoking about 56 years ago. He has never used smokeless tobacco. He reports current alcohol use. He reports that he does not use drugs.    Allergies:  Allergies[1]    Medications:    Current Facility-Administered Medications:     metoprolol succinate ER (Toprol XL) 24 hr tab 50 mg, 50 mg, Oral, 2x Daily(Beta Blocker)    dilTIAZem 10 mg BOLUS FROM BAG infusion, 10 mg, Intravenous, Q1H PRN    dilTIAZem (cardIZEM) 100 mg in sodium chloride 0.9% 100 mL IVPB-ADDV, 2.5-20 mg/hr, Intravenous, Continuous    sodium chloride 0.9% infusion, , Intravenous, Continuous    piperacillin-tazobactam (Zosyn) 3.375 g in dextrose 5% 100 mL IVPB-ADDV, 3.375 g, Intravenous, Q8H    oseltamivir (Tamiflu) cap 75 mg, 75 mg, Oral, BID    metoclopramide (Reglan) 5 mg/mL injection 10 mg, 10 mg, Intravenous, Q8H PRN    aspirin DR tab 81 mg, 81 mg, Oral, Daily    atorvastatin (Lipitor) tab 20 mg, 20 mg, Oral, Nightly    apixaban (Eliquis) tab 5 mg, 5 mg, Oral, BID    acetaminophen (Tylenol Extra Strength) tab 1,000 mg, 1,000 mg, Oral, Q8H PRN    melatonin tab 3 mg, 3 mg, Oral, Nightly PRN    polyethylene glycol (PEG 3350) (Miralax) 17 g oral packet 17 g, 17 g, Oral, Daily PRN     sennosides (Senokot) tab 17.2 mg, 17.2 mg, Oral, Nightly PRN    bisacodyl (Dulcolax) 10 MG rectal suppository 10 mg, 10 mg, Rectal, Daily PRN    ondansetron (Zofran) 4 MG/2ML injection 4 mg, 4 mg, Intravenous, Q6H PRN    benzonatate (Tessalon) cap 200 mg, 200 mg, Oral, TID PRN    guaiFENesin (Robitussin) 100 MG/5 ML oral liquid 200 mg, 200 mg, Oral, Q4H PRN    glycerin-hypromellose- (Artificial Tears) 0.2-0.2-1 % ophthalmic solution 1 drop, 1 drop, Both Eyes, QID PRN    sodium chloride (Saline Mist) 0.65 % nasal solution 1 spray, 1 spray, Each Nare, Q3H PRN    amiodarone (Pacerone) tab 200 mg, 200 mg, Oral, Daily    pantoprazole (Protonix) DR tab 40 mg, 40 mg, Oral, QAM AC    tamsulosin (Flomax) cap 0.4 mg, 0.4 mg, Oral, Daily    Intake/Output:    Intake/Output Summary (Last 24 hours) at 2/8/2025 0932  Last data filed at 2/8/2025 0447  Gross per 24 hour   Intake 240 ml   Output 1150 ml   Net -910 ml      Body mass index is 24.54 kg/m².    Review of Systems  Review of Systems:   A comprehensive 10 point review of systems was completed.  Pertinent positives and negatives noted in the the HPI.         Patient Vitals for the past 24 hrs:   BP Temp Temp src Pulse Resp SpO2 Weight   02/07/25 1138 114/52 98.4 °F (36.9 °C) Oral 88 18 96 % --   02/07/25 0823 97/45 98.6 °F (37 °C) Oral 72 20 98 % --   02/07/25 0553 -- -- -- -- -- -- 157 lb 3 oz (71.3 kg)   02/07/25 0530 122/54 98.9 °F (37.2 °C) Oral 79 18 93 % --   02/06/25 2300 120/48 98 °F (36.7 °C) Oral 69 20 93 % --   02/06/25 2156 -- -- -- 91 -- (!) 88 % --   02/06/25 1917 134/56 98.9 °F (37.2 °C) Oral 75 22 93 % --   02/06/25 1534 100/54 98.5 °F (36.9 °C) Oral 94 22 95 % --     Vitals:    02/07/25 2007 02/07/25 2300 02/08/25 0447 02/08/25 0819   BP: 94/46 103/61 99/50 106/47   BP Location: Right arm Right arm Right arm Left arm   Pulse: 86 75 71 84   Resp: 16 16 16 18   Temp: 99.2 °F (37.3 °C) 98.5 °F (36.9 °C) 97.8 °F (36.6 °C) 97.8 °F (36.6 °C)   TempSrc: Oral  Oral Oral Oral   SpO2: 90% 91% 93% 91%   Weight:   161 lb 6 oz (73.2 kg)    Height:             Physical Exam  Constitutional:       General: He is not in acute distress.     Appearance: Normal appearance. He is not ill-appearing or diaphoretic.   HENT:      Head: Normocephalic and atraumatic.      Nose: Nose normal. No congestion or rhinorrhea.      Mouth/Throat:      Mouth: Mucous membranes are moist.      Pharynx: Oropharynx is clear. No oropharyngeal exudate or posterior oropharyngeal erythema.      Comments: Mallampati class III palate   Eyes:      Extraocular Movements: Extraocular movements intact.      Pupils: Pupils are equal, round, and reactive to light.   Cardiovascular:      Rate and Rhythm: Normal rate.      Pulses: Normal pulses.      Heart sounds: Normal heart sounds. No murmur heard.  Pulmonary:      Effort: Pulmonary effort is normal. No respiratory distress.      Breath sounds: Normal breath sounds. No wheezing or rhonchi.   Chest:      Chest wall: No tenderness.   Abdominal:      General: Abdomen is flat. Bowel sounds are normal.      Palpations: Abdomen is soft.   Musculoskeletal:         General: Normal range of motion.   Skin:     General: Skin is warm.   Neurological:      General: No focal deficit present.      Mental Status: He is alert and oriented to person, place, and time.   Psychiatric:         Mood and Affect: Mood normal.         Behavior: Behavior normal.         Thought Content: Thought content normal.         Judgment: Judgment normal.            Lab Data Review:  Recent Labs   Lab 02/04/25  0115 02/06/25  0415 02/08/25  0542   WBC 10.0 8.9 18.1*   HGB 10.2* 11.2* 10.3*   HCT 29.1* 32.2* 29.8*   .0* 171.0 222.0   EOS  --  0 0       Recent Labs   Lab 02/02/25  0420 02/03/25  0335 02/04/25  0115 02/04/25  1436 02/06/25  0415 02/07/25  0509 02/08/25  0542      < > 133*  --  133* 132* 134*   K 3.6   < > 3.4*   < > 3.5 3.1* 3.4*  3.4*      < > 99  --  96* 94* 96*    CO2 24.0   < > 25.0  --  30.0 32.0 29.0   BUN 22   < > 19  --  17 18 22   CREATSERUM 0.76   < > 0.71  --  0.66* 0.74 0.78   CA 8.8   < > 8.9  --  8.8 8.6* 8.3*   ALB 3.3  --  3.4  --   --   --  3.2   ALKPHO 41*  --  37*  --   --   --  47   ALT 33  --  25  --   --   --  11   AST 43*  --  49*  --   --   --  65*   *   < > 103*  --  87 109* 155*    < > = values in this interval not displayed.       Recent Labs   Lab 02/05/25  0512 02/07/25  0509 02/08/25  0542   MG 2.1 1.8 1.8       Lab Results   Component Value Date    PHOS 2.8 02/06/2025        Recent Labs   Lab 02/02/25  0420   INR 1.39*       No results for input(s): \"ABGPHT\", \"UICKAL3N\", \"TEDRU9X\", \"ABGHCO3\", \"ABGBE\", \"TEMP\", \"SIVAKUMAR\", \"SITE\", \"DEV\", \"THGB\" in the last 168 hours.    No results for input(s): \"TROP\", \"CKMB\" in the last 168 hours.    Cultures:   Hospital Encounter on 01/31/25   1. Blood Culture     Status: None    Collection Time: 02/01/25  8:40 AM    Specimen: Blood,peripheral   Result Value Ref Range    Blood Culture Result No Growth 5 Days N/A   2. Urine Culture, Routine     Status: Abnormal    Collection Time: 01/31/25  1:39 AM    Specimen: Urine, clean catch   Result Value Ref Range    Urine Culture >100,000 CFU/ML Escherichia coli (A) N/A       Susceptibility    Escherichia coli -  (no method available)     Ampicillin 4 Sensitive      Cefazolin <=4 Sensitive      Ciprofloxacin <=0.25 Sensitive      Gentamicin <=1 Sensitive      Meropenem <=0.25 Sensitive      Levofloxacin <=0.12 Sensitive      Nitrofurantoin <=16 Sensitive      Piperacillin + Tazobactam <=4 Sensitive      Trimethoprim/Sulfa <=20 Sensitive            Radiology personally reviewed:  XR CHEST AP PORTABLE  (CPT=71045)    Result Date: 2/8/2025  CONCLUSION:  Worsening airspace consolidation in the left upper and mid lung field and slight worsening patchy airspace infiltrate in the right perihilar lung.  Findings are concerning for pneumonia.  Superimposed pulmonary edema is  not excluded.  The infiltrates should be followed to resolution to exclude any underlying lung lesion.   LOCATION:  [facility]      Dictated by (CST): Brendon Shannon MD on 2/08/2025 at 7:36 AM     Finalized by (CST): Brendon Shannon MD on 2/08/2025 at 7:38 AM         Patient Active Problem List   Diagnosis    Trochanteric bursitis of left hip    Disorientation    Sepsis due to undetermined organism (HCC)    Sepsis with hypotension (HCC)    Hyponatremia    Thrombocytopenia    Metabolic acidosis    Pyelonephritis     85 year old male with history of hypertension, hyperlipidemia, hypothyroidism, CVA,, atrial fibrillation, coronary artery disease status post CABG.    The patient was admitted on 1/31/2025 with septic shock due to UTI with E. coli.  The patient was admitted to ICU and later transferred to the floor.    Assessment:  Acute hypoxic respiratory failure: Requiring supplemental oxygen 2 L/min flow.  Suspect due to pulmonary edema with A-fib RVR, versus influenza A and left lower lobe atelectasis  Left upper lobe peripheral consolidation: Suspect related to pneumonia versus atelectasis  Left lower lobe atelectasis on chest x-ray 2/6/2025  E. coli UTI sepsis with  septic shock recently requiring vasopressors on 1/31/2025 now off  Chronic atrial fibrillation with rapid ventricular response and heart rate of 120s, on chronic anticoagulation  Hypertension  Hyperlipidemia  Hypothyroidism  Coronary artery disease status post CABG  History of CVA with no deficits  BPH        Plan:  Wean FiO2 to keep oxygen saturation between 90 to 92%  Incentive spirometry every 4 hours as possible  Follow-up chest x-ray  Continue Tamiflu 75 mg oral twice daily for 5 days  Infectious disease follow-up  Zosyn 3.375 g IV every 8 hours per infectious disease  DVT prophylaxis: SCD boots  /   apixaban  GI prophylaxis: Protonix 40 mg daily  Will follow for further recommendations  Follow labs and chest x-ray    Thank You for allowing me  to participate in this patient's care     Justin Arechiga MD    Spent 39   minutes of Critical Care time (exclusive of billable procedures) in evaluation, management and complex clinical decision making.   The patient is critically ill and high risk for deterioration . This also included (> 50% face-to-face time) extensive discussion with the patient, family, and clinical staff for care co-ordination      Justin Arechiga MD      Note to the patient: The 21st Century Cures Act makes medical notes like these available to patients in the interest of transparency. However, be advised that this is a medical document. It is intended as peer to peer communication. It is written in medical language and may contain abbreviations or verbiage that are unfamiliar. It may appear blunt or direct. Medical documents are intended to carry relevant information, facts as evident, and clinical opinion of the practitioner.      Disclaimer: Components of this note were documented using voice recognition system and are subject to errors not corrected at proofreading. Contact the author of this note for any clarifications         [1] No Known Allergies

## 2025-02-08 NOTE — PLAN OF CARE
Received patient from ortho spine for A fib RVR.Patient AXOX4.On 3L/NC.A fib on tele rated in 120's,started on Cardizem drip.Patient with high temperature,IVF started.,blood culture and lactic acid drawn.Started on IV  antibiotics.Swabbed for Covid/flu/RSV.Isolation precaution initiated.Incontinent of bladder,male external cath intact and draining.Call light within reach.Plan of care updated.  Problem: Patient/Family Goals  Goal: Patient/Family Long Term Goal  Description: Patient's Long Term Goal: dc to [facility]    Interventions:  - pt/ot  - pain control  - off oxygen  - See additional Care Plan goals for specific interventions  Outcome: Progressing  Goal: Patient/Family Short Term Goal  Description: Patient's Short Term Goal: pain control    Interventions:   - pharm and nonpharm interventions  - See additional Care Plan goals for specific interventions  Outcome: Progressing     Problem: CARDIOVASCULAR - ADULT  Goal: Maintains optimal cardiac output and hemodynamic stability  Description: INTERVENTIONS:  - Monitor vital signs, rhythm, and trends  - Monitor for bleeding, hypotension and signs of decreased cardiac output  - Evaluate effectiveness of vasoactive medications to optimize hemodynamic stability  - Monitor arterial and/or venous puncture sites for bleeding and/or hematoma  - Assess quality of pulses, skin color and temperature  - Assess for signs of decreased coronary artery perfusion - ex. Angina  - Evaluate fluid balance, assess for edema, trend weights  Outcome: Progressing     Problem: RESPIRATORY - ADULT  Goal: Achieves optimal ventilation and oxygenation  Description: INTERVENTIONS:  - Assess for changes in respiratory status  - Assess for changes in mentation and behavior  - Position to facilitate oxygenation and minimize respiratory effort  - Oxygen supplementation based on oxygen saturation or ABGs  - Provide Smoking Cessation handout, if applicable  - Encourage broncho-pulmonary hygiene including  cough, deep breathe, Incentive Spirometry  - Assess the need for suctioning and perform as needed  - Assess and instruct to report SOB or any respiratory difficulty  - Respiratory Therapy support as indicated  - Manage/alleviate anxiety  - Monitor for signs/symptoms of CO2 retention  Outcome: Progressing     Problem: RISK FOR INFECTION - ADULT  Goal: Absence of fever/infection during anticipated neutropenic period  Description: INTERVENTIONS  - Monitor WBC  - Administer growth factors as ordered  - Implement neutropenic guidelines  Outcome: Progressing

## 2025-02-08 NOTE — PROGRESS NOTES
Pt ambulated with 1 assist in walker 50 ft in hallway. Pt tolerated fairly well but unsteady at times. Pt required 4 L 02 with ambulation due to o2 dropping to mid 80's. Once patient back in bed able to recover back to 2 L NC.       02/08/25 1525   Mobility   O2 walk? Yes   SPO2% on Room Air at Rest 94   SPO2% on Oxygen at Rest 92   At rest oxygen flow (liters per minute) 2   SPO2% Ambulation on Oxygen 92   Ambulation oxygen flow (liters per minute) 4

## 2025-02-08 NOTE — PROGRESS NOTES
[facility]   part of [facility]     Hospitalist Progress Note     Jose Alfredo Solis Patient Status:  Inpatient    1940 MRN [id number]   Location [facility] 4SW-A Attending Henna Edge MD   Hosp Day # 8 PCP MIKAYLA BAUGH, MD ROSEANN     Chief Complaint: Weakness     Subjective:     Was septic yesterday, much better today. No further fevers. HR much improved    Objective:    Review of Systems:   A comprehensive review of systems was completed; pertinent positive and negatives stated in subjective.    Vital signs:  Temp:  [97.8 °F (36.6 °C)-101.9 °F (38.8 °C)] 98.3 °F (36.8 °C)  Pulse:  [] 94  Resp:  [16-22] 22  BP: ()/(46-62) 118/62  SpO2:  [90 %-96 %] 96 %    Physical Exam:    General: No acute distress  Respiratory: No wheezes, no rhonchi, congested  Cardiovascular: S1, S2, regular rate and rhythm  Abdomen: Soft, Non-tender, non-distended, positive bowel sounds  Neuro: No new focal deficits.   Extremities: No edema      Diagnostic Data:    Labs:  Recent Labs   Lab 25  04225  0334 25  0115 25  0415 25  0542   WBC 6.8 8.4 10.0 8.9 18.1*   HGB 10.9* 10.6* 10.2* 11.2* 10.3*   MCV 89.8 88.8 89.0 89.4 88.7   PLT 95.0* 101.0* 108.0* 171.0 222.0   BAND  --   --   --  2 10   INR 1.39*  --   --   --   --        Recent Labs   Lab 25  0420 25  0335 25  0115 25  1436 25  0415 25  0509 25  0542   *   < > 103*  --  87 109* 155*   BUN 22   < > 19  --  17 18 22   CREATSERUM 0.76   < > 0.71  --  0.66* 0.74 0.78   CA 8.8   < > 8.9  --  8.8 8.6* 8.3*   ALB 3.3  --  3.4  --   --   --  3.2      < > 133*  --  133* 132* 134*   K 3.6   < > 3.4*   < > 3.5 3.1* 3.4*  3.4*      < > 99  --  96* 94* 96*   CO2 24.0   < > 25.0  --  30.0 32.0 29.0   ALKPHO 41*  --  37*  --   --   --  47   AST 43*  --  49*  --   --   --  65*   ALT 33  --  25  --   --   --  11   BILT 0.8  --  0.7  --   --   --  1.4*   TP 5.4*  --  5.6*   --   --   --  5.6*    < > = values in this interval not displayed.       Estimated Glomerular Filtration Rate: 86.3 mL/min/1.73m2 (by CKD-EPI based on SCr of 0.78 mg/dL).    No results for input(s): \"TROP\", \"TROPHS\", \"CK\" in the last 168 hours.      Recent Labs   Lab 02/02/25  0420   PTP 17.2*   INR 1.39*                  Microbiology    Hospital Encounter on 01/31/25   1. Blood Culture     Status: None    Collection Time: 02/01/25  8:40 AM    Specimen: Blood,peripheral   Result Value Ref Range    Blood Culture Result No Growth 5 Days N/A   2. Urine Culture, Routine     Status: Abnormal    Collection Time: 01/31/25  1:39 AM    Specimen: Urine, clean catch   Result Value Ref Range    Urine Culture >100,000 CFU/ML Escherichia coli (A) N/A       Susceptibility    Escherichia coli -  (no method available)     Ampicillin 4 Sensitive      Cefazolin <=4 Sensitive      Ciprofloxacin <=0.25 Sensitive      Gentamicin <=1 Sensitive      Meropenem <=0.25 Sensitive      Levofloxacin <=0.12 Sensitive      Nitrofurantoin <=16 Sensitive      Piperacillin + Tazobactam <=4 Sensitive      Trimethoprim/Sulfa <=20 Sensitive          Imaging: Reviewed in Epic.    Medications:    metoprolol succinate ER  50 mg Oral 2x Daily(Beta Blocker)    piperacillin-tazobactam  3.375 g Intravenous Q8H    oseltamivir  75 mg Oral BID    aspirin  81 mg Oral Daily    atorvastatin  20 mg Oral Nightly    apixaban  5 mg Oral BID    amiodarone  200 mg Oral Daily    pantoprazole  40 mg Oral QAM AC    tamsulosin  0.4 mg Oral Daily       Assessment & Plan:      #Septic shock due to Ecoli UTI and bacteremia- shock resolved, off pressors  Repeat blood Cx 2/1 NGTD   IV Abx: Ancef   ID on Cs-IV Abx, dc on PO     #recurrent sepsis 2/7  #2/2 suspect gram negative PNA  #acute pulm edema as well  Gave IVF back yesterday when septic. No sepsis bolus due to recent FOL  BNP elevated  Cont to try to wean O2  ECHO unremarkable  D/w ID, expanded abx to zosyn  Repeat  bcxs  Consulted pulm     #Afib, permanent w/w RVR  BB  DOAC, Amio  In [facility], 2/2 sepsis. HR much improved now that improved  Cards consulted. Dc cardizem gtt    #h/o CAD s/p CABG  #Hypothyroid  #Hypertension  #HLD    Jovanni Villarreal MD    Supplementary Documentation:     Quality:  DVT Mechanical Prophylaxis:   SCDs, Early ambuation  DVT Pharmacologic Prophylaxis   Medication    apixaban (Eliquis) tab 5 mg         DVT Pharmacologic prophylaxis: Aspirin 81 mg      Code Status: Full Code  Garnica: External urinary catheter in place  Garnica Duration (in days):   Central line:    CARMINA: 2/10/2025    Discharge is dependent on: clinical   At this point Mr. Solis is expected to be discharge to: tbd     The 21st Century Cures Act makes medical notes like these available to patients in the interest of transparency. Please be advised this is a medical document. Medical documents are intended to carry relevant information, facts as evident, and the clinical opinion of the practitioner. The medical note is intended as peer to peer communication and may appear blunt or direct. It is written in medical language and may contain abbreviations or verbiage that are unfamiliar.              **Certification      PHYSICIAN Certification of Need for Inpatient Hospitalization - Initial Certification    Patient will require inpatient services that will reasonably be expected to span two midnight's based on the clinical documentation in H+P.   Based on patients current state of illness, I anticipate that, after discharge, patient will require TBD.

## 2025-02-08 NOTE — PROGRESS NOTES
INFECTIOUS DISEASE  PROGRESS NOTE            [facility]    Jose Alfredo Solis Patient Status:  Inpatient    1940 MRN [id number]   [facility] 4SW-A Attending Henna Edge MD   Hosp Day # 8 PCP MIKAYLA BAUGH, MD ROSEANN     Antimicrobials: #8  Zosyn D#1  Tamiflu D#2    Subjective: Patient seen and examined today. Reports a cough, similar to the previous day. Afebrile. On 2L NC.     Objective:  Temp:  [97.8 °F (36.6 °C)-101.9 °F (38.8 °C)] 98.3 °F (36.8 °C)  Pulse:  [] 94  Resp:  [16-22] 22  BP: ()/(46-62) 118/62  SpO2:  [90 %-96 %] 96 %    General: awake alert, on NC  Vital signs:Temp:  [97.8 °F (36.6 °C)-101.9 °F (38.8 °C)] 98.3 °F (36.8 °C)  Pulse:  [] 94  Resp:  [16-22] 22  BP: ()/(46-62) 118/62  SpO2:  [90 %-96 %] 96 %  HEENT: Moist mucous membranes. Extraocular muscles are intact.  Neck: supple, no masses  Respiratory: Crackles bilaterally.  Abdomen: Soft, nontender, nondistended.   Musculoskeletal: Movement of extremities; joints: no swelling   Integument: Multiple bandages in place with skin tears noted (pictures reviewed in epic)    Labs:     COVID-19 Lab Results    COVID-19  Lab Results   Component Value Date    COVID19 Not Detected 2025    COVID19 Not Detected 2025       Pro-Calcitonin  Recent Labs   Lab 25  0415   PCT 1.31*       Cardiac  Recent Labs   Lab 25  0512   PBNP 5,617*       Creatinine Kinase  No results for input(s): \"CK\" in the last 168 hours.    Inflammatory Markers  No results for input(s): \"CRP\", \"ARIK\", \"LDH\", \"DDIMER\" in the last 168 hours.    Recent Labs   Lab 25  0542   RBC 3.36*   HGB 10.3*   HCT 29.8*   MCV 88.7   MCH 30.7   MCHC 34.6   RDW 14.6   NEPRELIM 13.08*   WBC 18.1*   .0   NEUT 73   LYMPH 8   MON 9   EOS 0   NRBC 1*         Recent Labs   Lab 25  0420 25  0335 25  0115 25  1436 25  0415 25  0509 25  0542   *   < > 103*  --  87 109* 155*    BUN 22   < > 19  --  17 18 22   CREATSERUM 0.76   < > 0.71  --  0.66* 0.74 0.78   CA 8.8   < > 8.9  --  8.8 8.6* 8.3*   ALB 3.3  --  3.4  --   --   --  3.2      < > 133*  --  133* 132* 134*   K 3.6   < > 3.4*   < > 3.5 3.1* 3.4*  3.4*      < > 99  --  96* 94* 96*   CO2 24.0   < > 25.0  --  30.0 32.0 29.0   ALKPHO 41*  --  37*  --   --   --  47   AST 43*  --  49*  --   --   --  65*   ALT 33  --  25  --   --   --  11   BILT 0.8  --  0.7  --   --   --  1.4*   TP 5.4*  --  5.6*  --   --   --  5.6*    < > = values in this interval not displayed.       No results found for: \"VANCT\"  Microbiology    Hospital Encounter on 01/31/25   1. Blood Culture     Status: None    Collection Time: 02/01/25  8:40 AM    Specimen: Blood,peripheral   Result Value Ref Range    Blood Culture Result No Growth 5 Days N/A   2. Urine Culture, Routine     Status: Abnormal    Collection Time: 01/31/25  1:39 AM    Specimen: Urine, clean catch   Result Value Ref Range    Urine Culture >100,000 CFU/ML Escherichia coli (A) N/A       Susceptibility    Escherichia coli -  (no method available)     Ampicillin 4 Sensitive      Cefazolin <=4 Sensitive      Ciprofloxacin <=0.25 Sensitive      Gentamicin <=1 Sensitive      Meropenem <=0.25 Sensitive      Levofloxacin <=0.12 Sensitive      Nitrofurantoin <=16 Sensitive      Piperacillin + Tazobactam <=4 Sensitive      Trimethoprim/Sulfa <=20 Sensitive        Problem list reviewed:  Patient Active Problem List   Diagnosis    Trochanteric bursitis of left hip    Disorientation    Sepsis due to undetermined organism (HCC)    Sepsis with hypotension (HCC)    Hyponatremia    Thrombocytopenia    Metabolic acidosis    Pyelonephritis       Imaging: Reviewed.       ASSESSMENT/PLAN:  1. Influenza A with possible superimposed bacterial pneumonia  -CXR with worsening airspace consolidation    2. E. coli UTI and bacteremia   -Bcx 2/2 + 1/31, repeat bcxs 2/1 negative  Reported slow stream in the  past  Currently voiding with external cath  Has been voiding without signifciant residual  CT no stone or hydro    3. Acute respiratory failure with hypoxia, d/t #1 in addition to pulmonary edema.    4. Leukocytosis  -WBC elevated earlier this admission d/t #2, resolved. Now again with leukocytosis, assume d/t #1    5. Cardiomyopathy, CHF  Possible component of ALI  Diuresis per hospialist    6. Gallstones    7. CAD SP CABG/Afib    PLAN:  -continue IV Zosyn for UTI/bacteremia and possible superimposed bacterial pna  -continue Tamiflu for influenza  -follow respiratory status, wean O2 as able  -follow WBC and temps  -follow new blood cultures from 2/7- ngtd  -check sputum culture if able    Discussed case with RN, patient and Dr. Rao.    Angelic Stevens PA-C

## 2025-02-08 NOTE — PLAN OF CARE
Pt A&Ox4. Pt in droplet isolation for influenza. Pt with strong cough. PRN robutussin given. Pt lungs dim. Pt required increase oxygen to 3.5 L this AM because of spo2 of 85%. I have since weaned his 02 back down to 2 L NC. Fluids dced. IV zosyn infusing. Afib on tele. Rates controlled. BM today. Primofit for urination at this time. Pt ambulates 1 assist and walker. Pt with bruising scattered. Skin tears noted to bilat arms. Pictures in chart. Mepilex's in place for protection. K and Mag replaced. Scarum red but blanchable. Mepliex in place. Chair alram on for safety.         Problem: Patient/Family Goals  Goal: Patient/Family Long Term Goal  Description: Patient's Long Term Goal: dc to [facility]    Interventions:  - pt/ot  - pain control  - off oxygen  - See additional Care Plan goals for specific interventions  Outcome: Progressing  Goal: Patient/Family Short Term Goal  Description: Patient's Short Term Goal: pain control    Interventions:   - pharm and nonpharm interventions  - See additional Care Plan goals for specific interventions  Outcome: Progressing     Problem: CARDIOVASCULAR - ADULT  Goal: Maintains optimal cardiac output and hemodynamic stability  Description: INTERVENTIONS:  - Monitor vital signs, rhythm, and trends  - Monitor for bleeding, hypotension and signs of decreased cardiac output  - Evaluate effectiveness of vasoactive medications to optimize hemodynamic stability  - Monitor arterial and/or venous puncture sites for bleeding and/or hematoma  - Assess quality of pulses, skin color and temperature  - Assess for signs of decreased coronary artery perfusion - ex. Angina  - Evaluate fluid balance, assess for edema, trend weights  Outcome: Progressing     Problem: RESPIRATORY - ADULT  Goal: Achieves optimal ventilation and oxygenation  Description: INTERVENTIONS:  - Assess for changes in respiratory status  - Assess for changes in mentation and behavior  - Position to facilitate oxygenation and minimize  respiratory effort  - Oxygen supplementation based on oxygen saturation or ABGs  - Provide Smoking Cessation handout, if applicable  - Encourage broncho-pulmonary hygiene including cough, deep breathe, Incentive Spirometry  - Assess the need for suctioning and perform as needed  - Assess and instruct to report SOB or any respiratory difficulty  - Respiratory Therapy support as indicated  - Manage/alleviate anxiety  - Monitor for signs/symptoms of CO2 retention  Outcome: Progressing     Problem: RISK FOR INFECTION - ADULT  Goal: Absence of fever/infection during anticipated neutropenic period  Description: INTERVENTIONS  - Monitor WBC  - Administer growth factors as ordered  - Implement neutropenic guidelines  Outcome: Progressing

## 2025-02-08 NOTE — PLAN OF CARE
PT A&OX4, on 3 L NC  Vpaced/ Afib on tele, on Cardizem gtt 2.5 ml   (+) flu, droplet iso in place.   No fever noted during the night.  Non-productive cough reported, cough med given help relieved   Denied CP. Denied SOB. Complaint of general pain, PRN tylenol given.   Pt reported BM  X1 walker  Updated POC to pt, all needs meet at this time.   Call light within reach, bed alarm on for pt safety.     POC:   IV ABX Q8  Tamiflu  Dw    Problem: Patient/Family Goals  Goal: Patient/Family Long Term Goal  Description: Patient's Long Term Goal: dc to [facility]    Interventions:  - pt/ot  - pain control  - off oxygen  - See additional Care Plan goals for specific interventions  Outcome: Progressing  Goal: Patient/Family Short Term Goal  Description: Patient's Short Term Goal: pain control    Interventions:   - pharm and nonpharm interventions  - See additional Care Plan goals for specific interventions  Outcome: Progressing     Problem: CARDIOVASCULAR - ADULT  Goal: Maintains optimal cardiac output and hemodynamic stability  Description: INTERVENTIONS:  - Monitor vital signs, rhythm, and trends  - Monitor for bleeding, hypotension and signs of decreased cardiac output  - Evaluate effectiveness of vasoactive medications to optimize hemodynamic stability  - Monitor arterial and/or venous puncture sites for bleeding and/or hematoma  - Assess quality of pulses, skin color and temperature  - Assess for signs of decreased coronary artery perfusion - ex. Angina  - Evaluate fluid balance, assess for edema, trend weights  Outcome: Progressing     Problem: RESPIRATORY - ADULT  Goal: Achieves optimal ventilation and oxygenation  Description: INTERVENTIONS:  - Assess for changes in respiratory status  - Assess for changes in mentation and behavior  - Position to facilitate oxygenation and minimize respiratory effort  - Oxygen supplementation based on oxygen saturation or ABGs  - Provide Smoking Cessation handout, if applicable  - Encourage  broncho-pulmonary hygiene including cough, deep breathe, Incentive Spirometry  - Assess the need for suctioning and perform as needed  - Assess and instruct to report SOB or any respiratory difficulty  - Respiratory Therapy support as indicated  - Manage/alleviate anxiety  - Monitor for signs/symptoms of CO2 retention  Outcome: Progressing     Problem: RISK FOR INFECTION - ADULT  Goal: Absence of fever/infection during anticipated neutropenic period  Description: INTERVENTIONS  - Monitor WBC  - Administer growth factors as ordered  - Implement neutropenic guidelines  Outcome: Progressing

## 2025-02-08 NOTE — CONSULTS
[facility]  Consultation    Jose Alfredo Solis Patient Status:  Inpatient    1940 MRN [id number]   Location [facility] 8NE-A Attending Jovanni Villarreal MD   Hosp Day # 8 PCP MIKAYLA BAUGH, MD ROSEANN       Reason for consultation;  AFib     HPI:  This is an 89 year old male patient with PMH of  CAD s/p CABG, Afib, HTN, DLP who was admitted for UTI. This improved, but he developed worsening hypoxia secondary to influenze & PNA. Cardiology were consulted for management of Afib.    MDM / Diagnostics reviewed & interpreted:  Tele shows Afib with HR ~90s  CXR with consolidation    Assessment:    Afib. Rate controlled  CAD s/p CABG  HTN  DLP  Acute hypoxic respiratory failure secondary to influezna/PNA    Plan:  -Afib with RVR. Triggered by hypoxia, and PNA/sepsis. Rate control is reasonable today, will increase metoprolol, and continue amiodarone/eliquis  -focus on management of pulmonary process. Would continue gentle diuresis if needed, though pulmonary edema is not likely to be the main contributor to hypoxia based on the CXR pattern, and the diastolic function parameters on TTE    Discussed with patient.     Please call with questions. Thank you for your consult.    Level of care: ALEKSANDRA Quigley MD  Interventional Cardiology  [facility]    --------------------------------------------------------------------------------------------------------------------------------  ROS 10 systems reviewed, pertinent findings above.    History:  Past Medical History:    High cholesterol    Hypertension    Hypothyroid    Stroke (HCC)     Past Surgical History:   Procedure Laterality Date    Cabg      Other surgical history      prostate surgery     Family History   Problem Relation Age of Onset    Other (Other) Father         leukemia      reports that he quit smoking about 56 years ago. He has never used smokeless tobacco. He reports current alcohol use. He reports that he does not  use drugs.    Objective:   Temp: 97.8 °F (36.6 °C)  Pulse: 84  Resp: 18  BP: 106/47    Intake/Output:     Intake/Output Summary (Last 24 hours) at 2/8/2025 0913  Last data filed at 2/8/2025 0447  Gross per 24 hour   Intake 240 ml   Output 1150 ml   Net -910 ml       Physical Exam:     General: NAD. Conversant.   HEENT: Normocephalic, atraumatic.  Neck: Supple.  Cardiac: Irreg irreg. Normal S1, S2 . No murmur.  Lungs: Diminished, no wheezing.  GI: Soft, non-distended  Extremities: Rt radial pulses 2+. No edema.  Skin: Warm and dry.      Yoan Quigley MD  2/8/2025  9:13 AM

## 2025-02-09 ENCOUNTER — APPOINTMENT (OUTPATIENT)
Dept: GENERAL RADIOLOGY | Facility: HOSPITAL | Age: 85
End: 2025-02-09
Attending: INTERNAL MEDICINE
Payer: MEDICARE

## 2025-02-09 LAB
ANION GAP SERPL CALC-SCNC: 11 MMOL/L (ref 0–18)
BASOPHILS # BLD: 0.16 X10(3) UL (ref 0–0.2)
BASOPHILS NFR BLD: 1 %
BUN BLD-MCNC: 13 MG/DL (ref 9–23)
CALCIUM BLD-MCNC: 8.6 MG/DL (ref 8.7–10.6)
CHLORIDE SERPL-SCNC: 98 MMOL/L (ref 98–112)
CO2 SERPL-SCNC: 25 MMOL/L (ref 21–32)
CREAT BLD-MCNC: 0.65 MG/DL
EGFRCR SERPLBLD CKD-EPI 2021: 91 ML/MIN/1.73M2 (ref 60–?)
EOSINOPHIL # BLD: 0.16 X10(3) UL (ref 0–0.7)
EOSINOPHIL NFR BLD: 1 %
ERYTHROCYTE [DISTWIDTH] IN BLOOD BY AUTOMATED COUNT: 14.8 %
GLUCOSE BLD-MCNC: 117 MG/DL (ref 70–99)
HCT VFR BLD AUTO: 30.7 %
HGB BLD-MCNC: 10.4 G/DL
LYMPHOCYTES NFR BLD: 13 %
LYMPHOCYTES NFR BLD: 2.02 X10(3) UL (ref 1–4)
MAGNESIUM SERPL-MCNC: 1.9 MG/DL (ref 1.6–2.6)
MCH RBC QN AUTO: 30.9 PG (ref 26–34)
MCHC RBC AUTO-ENTMCNC: 33.9 G/DL (ref 31–37)
MCV RBC AUTO: 91.1 FL
MONOCYTES # BLD: 0.62 X10(3) UL (ref 0.1–1)
MONOCYTES NFR BLD: 4 %
MORPHOLOGY: NORMAL
NEUTROPHILS # BLD AUTO: 10.07 X10 (3) UL (ref 1.5–7.7)
NEUTROPHILS NFR BLD: 80 %
NEUTS BAND NFR BLD: 1 %
NEUTS HYPERSEG # BLD: 12.56 X10(3) UL (ref 1.5–7.7)
OSMOLALITY SERPL CALC.SUM OF ELEC: 279 MOSM/KG (ref 275–295)
PLATELET # BLD AUTO: 298 10(3)UL (ref 150–450)
PLATELET MORPHOLOGY: NORMAL
POTASSIUM SERPL-SCNC: 3.9 MMOL/L (ref 3.5–5.1)
POTASSIUM SERPL-SCNC: 3.9 MMOL/L (ref 3.5–5.1)
RBC # BLD AUTO: 3.37 X10(6)UL
SODIUM SERPL-SCNC: 134 MMOL/L (ref 136–145)
TOTAL CELLS COUNTED BLD: 100
WBC # BLD AUTO: 15.5 X10(3) UL (ref 4–11)

## 2025-02-09 PROCEDURE — 99232 SBSQ HOSP IP/OBS MODERATE 35: CPT | Performed by: HOSPITALIST

## 2025-02-09 PROCEDURE — 71045 X-RAY EXAM CHEST 1 VIEW: CPT | Performed by: INTERNAL MEDICINE

## 2025-02-09 PROCEDURE — 99233 SBSQ HOSP IP/OBS HIGH 50: CPT | Performed by: INTERNAL MEDICINE

## 2025-02-09 NOTE — SLP NOTE
SPEECH DAILY NOTE - INPATIENT    ASSESSMENT & PLAN   ASSESSMENT  Pt seen for dysphagia tx to assess tolerance with recommended diet, ensure proper utilization of aspiration precautions and provide pt/family education.  Patient received alert and oriented in bed. Oxygenation fluctuating and now on 2L via NC. Patient with ongoing chronic cough. Compensatory strategies were reviewed and patient exhibited appropriate use during PO trials of thin liquids. No overt s/s of aspiration observed. Pt independently performing pharyngeal strengthening exercises. Patient able to complete effortful swallow and Jojo independently. Written instructions provided and encouraged patient to complete pharyngeal strengthening exercises 2-3x/day.     Diet Recommendations - Solids: Regular  Diet Recommendations - Liquids: Thin Liquids    Compensatory Strategies Recommended: Alternate consistencies  Aspiration Precautions: Upright position;Small bites;Small sips  Medication Administration Recommendations:  (In puree as necessary)    Patient Experiencing Pain: No                Treatment Plan  Treatment Plan/Recommendations: Dysphagia therapy    Interdisciplinary Communication: Discussed with RN            GOALS  Goal #1 Patient will tolerate soft and bite sized diet with nectar thick liquids, no straws without overt s/s of aspiration with 90% accuracy   Revised   Goal #2 Patient will tolerate PO trials of general solids and thin liquids without overt s/s of aspiration with 90% accuracy      Discharge   Goal #3 Patient will participate in VFSS if continued concerns of aspiration are present and VFSS is clinically necessary   Met   Goal #4 The patient will tolerate regular consistency and thin liquids without overt signs or symptoms of aspiration with 90 % accuracy over 1-2 session(s).     Met   Goal #5 The patient will utilize compensatory strategies as outlined by  VFSS including Multiple swallows, Alternate liquids/solids with mild  assistance 90 % of the time across 2 sessions.     Met   Goal #6 Patient will complete pharyngeal strengthening exercises with 90% accuracy when provided mild cues within 3 visits.     Met   Goal #7       Goal #8            FOLLOW UP  Follow Up Needed (Documentation Required): No  SLP Follow-up Date: 02/07/25  Duration: 1 week    Session: 3    If you have any questions, please contact Lady Palomino, MARYANNE Palomino M.S., CCC-SLP/L  Speech-Language Pathologist

## 2025-02-09 NOTE — PROGRESS NOTES
[facility]   part of [facility]     Hospitalist Progress Note     Jose Alfredo Solis Patient Status:  Inpatient    1940 MRN [id number]   Location [facility] 4SW-A Attending Henna Edge MD   Hosp Day # 9 PCP MIKAYLA BAUGH, MD ROSEANN     Chief Complaint: Weakness     Subjective:     Was septic yesterday, much better today. No further fevers. HR much improved    Objective:    Review of Systems:   A comprehensive review of systems was completed; pertinent positive and negatives stated in subjective.    Vital signs:  Temp:  [97.5 °F (36.4 °C)-99.3 °F (37.4 °C)] 97.5 °F (36.4 °C)  Pulse:  [] 84  Resp:  [17-22] 22  BP: (100-138)/(63-85) 138/74  SpO2:  [90 %-96 %] 96 %    Physical Exam:    General: No acute distress  Respiratory: No wheezes, no rhonchi, congested  Cardiovascular: S1, S2, regular rate and rhythm  Abdomen: Soft, Non-tender, non-distended, positive bowel sounds  Neuro: No new focal deficits.   Extremities: No edema      Diagnostic Data:    Labs:  Recent Labs   Lab 25  0334 25  0115 25  0415 25  0542 25  0546   WBC 8.4 10.0 8.9 18.1* 15.5*   HGB 10.6* 10.2* 11.2* 10.3* 10.4*   MCV 88.8 89.0 89.4 88.7 91.1   .0* 108.0* 171.0 222.0 298.0   BAND  --   --  2 10 1       Recent Labs   Lab 25  0115 25  1436 25  0509 25  0542 25  0546   *   < > 109* 155* 117*   BUN 19   < > 18 22 13   CREATSERUM 0.71   < > 0.74 0.78 0.65*   CA 8.9   < > 8.6* 8.3* 8.6*   ALB 3.4  --   --  3.2  --    *   < > 132* 134* 134*   K 3.4*   < > 3.1* 3.4*  3.4* 3.9  3.9   CL 99   < > 94* 96* 98   CO2 25.0   < > 32.0 29.0 25.0   ALKPHO 37*  --   --  47  --    AST 49*  --   --  65*  --    ALT 25  --   --  11  --    BILT 0.7  --   --  1.4*  --    TP 5.6*  --   --  5.6*  --     < > = values in this interval not displayed.       Estimated Glomerular Filtration Rate: 91.2 mL/min/1.73m2 (A) (by CKD-EPI based on SCr of 0.65 mg/dL  (L)).    No results for input(s): \"TROP\", \"TROPHS\", \"CK\" in the last 168 hours.      No results for input(s): \"PTP\", \"INR\" in the last 168 hours.                 Microbiology    Hospital Encounter on 01/31/25   1. Blood Culture     Status: None (Preliminary result)    Collection Time: 02/07/25  7:12 PM    Specimen: Blood,peripheral   Result Value Ref Range    Blood Culture Result No Growth 1 Day N/A   2. Urine Culture, Routine     Status: Abnormal    Collection Time: 01/31/25  1:39 AM    Specimen: Urine, clean catch   Result Value Ref Range    Urine Culture >100,000 CFU/ML Escherichia coli (A) N/A       Susceptibility    Escherichia coli -  (no method available)     Ampicillin 4 Sensitive      Cefazolin <=4 Sensitive      Ciprofloxacin <=0.25 Sensitive      Gentamicin <=1 Sensitive      Meropenem <=0.25 Sensitive      Levofloxacin <=0.12 Sensitive      Nitrofurantoin <=16 Sensitive      Piperacillin + Tazobactam <=4 Sensitive      Trimethoprim/Sulfa <=20 Sensitive          Imaging: Reviewed in Epic.    Medications:    metoprolol succinate ER  50 mg Oral 2x Daily(Beta Blocker)    piperacillin-tazobactam  3.375 g Intravenous Q8H    oseltamivir  75 mg Oral BID    aspirin  81 mg Oral Daily    atorvastatin  20 mg Oral Nightly    apixaban  5 mg Oral BID    amiodarone  200 mg Oral Daily    pantoprazole  40 mg Oral QAM AC    tamsulosin  0.4 mg Oral Daily       Assessment & Plan:      #Septic shock due to Ecoli UTI and bacteremia- shock resolved, off pressors  Repeat blood Cx 2/1 NGTD   IV Abx: Ancef   ID on Cs-IV Abx, dc on PO     #recurrent sepsis 2/7  #2/2 suspect gram negative PNA  #acute pulm edema as well  #acute respiratory failure w/ hypoxia  #influenza A  Gave IVF back when septic. No sepsis bolus due to recent FOL  BNP elevated  Cont to try to wean O2. Still on 2L   ECHO unremarkable  D/w ID, expanded abx to zosyn  Repeat bcxs  Consulted pulm   -tamiflu     #Afib, permanent w/w RVR  BB  DOAC, Amio  In [facility],  2/2 sepsis. HR much improved now that improved  Cards consulted. Dc cardizem gtt    #h/o CAD s/p CABG  #Hypothyroid  #Hypertension  #HLD    Jovanni Villarreal MD    Supplementary Documentation:     Quality:  DVT Mechanical Prophylaxis:   SCDs, Early ambuation  DVT Pharmacologic Prophylaxis   Medication    apixaban (Eliquis) tab 5 mg         DVT Pharmacologic prophylaxis: Aspirin 81 mg      Code Status: Full Code  Garnica: External urinary catheter in place  Garnica Duration (in days):   Central line:    CARMINA: 2/10/2025    Discharge is dependent on: clinical   At this point Mr. Solis is expected to be discharge to: tbd     The 21st Century Cures Act makes medical notes like these available to patients in the interest of transparency. Please be advised this is a medical document. Medical documents are intended to carry relevant information, facts as evident, and the clinical opinion of the practitioner. The medical note is intended as peer to peer communication and may appear blunt or direct. It is written in medical language and may contain abbreviations or verbiage that are unfamiliar.              **Certification      PHYSICIAN Certification of Need for Inpatient Hospitalization - Initial Certification    Patient will require inpatient services that will reasonably be expected to span two midnight's based on the clinical documentation in H+P.   Based on patients current state of illness, I anticipate that, after discharge, patient will require TBD.

## 2025-02-09 NOTE — PROGRESS NOTES
[facility]  Pulmonary/Critical Care Progress  note    Jose Alfredo Solis Patient Status:  Inpatient    1940 MRN [id number]   Location [facility] 3SW-A Attending Jovanni Villarreal MD   Hosp Day # 9 PCP MIKAYLA BAUGH, MD ROSEANN     History of Present Illness:  Feels much better today sitting up in chair.  Atrial fibrillation rate controlled.  Requiring supplemental oxygen 2 L/min by nasal cannula.  I-S 500 mL  .  The patient was admitted on 2025.  SinceHistory:  Past Medical History:    High cholesterol    Hypertension    Hypothyroid    Stroke (HCC)     Past Surgical History:   Procedure Laterality Date    Cabg      Other surgical history      prostate surgery     Family History   Problem Relation Age of Onset    Other (Other) Father         leukemia      reports that he quit smoking about 56 years ago. He has never used smokeless tobacco. He reports current alcohol use. He reports that he does not use drugs.    Allergies:  Allergies[1]    Medications:    Current Facility-Administered Medications:     metoprolol succinate ER (Toprol XL) 24 hr tab 50 mg, 50 mg, Oral, 2x Daily(Beta Blocker)    guaiFENesin-codeine (Robitussin AC) 100-10 MG/5ML oral solution 10 mL, 10 mL, Oral, Q4H PRN    dilTIAZem 10 mg BOLUS FROM BAG infusion, 10 mg, Intravenous, Q1H PRN    dilTIAZem (cardIZEM) 100 mg in sodium chloride 0.9% 100 mL IVPB-ADDV, 2.5-20 mg/hr, Intravenous, Continuous    sodium chloride 0.9% infusion, , Intravenous, Continuous    piperacillin-tazobactam (Zosyn) 3.375 g in dextrose 5% 100 mL IVPB-ADDV, 3.375 g, Intravenous, Q8H    oseltamivir (Tamiflu) cap 75 mg, 75 mg, Oral, BID    metoclopramide (Reglan) 5 mg/mL injection 10 mg, 10 mg, Intravenous, Q8H PRN    aspirin DR tab 81 mg, 81 mg, Oral, Daily    atorvastatin (Lipitor) tab 20 mg, 20 mg, Oral, Nightly    apixaban (Eliquis) tab 5 mg, 5 mg, Oral, BID    acetaminophen (Tylenol Extra Strength) tab 1,000 mg, 1,000 mg, Oral, Q8H PRN    melatonin tab 3  mg, 3 mg, Oral, Nightly PRN    polyethylene glycol (PEG 3350) (Miralax) 17 g oral packet 17 g, 17 g, Oral, Daily PRN    sennosides (Senokot) tab 17.2 mg, 17.2 mg, Oral, Nightly PRN    bisacodyl (Dulcolax) 10 MG rectal suppository 10 mg, 10 mg, Rectal, Daily PRN    ondansetron (Zofran) 4 MG/2ML injection 4 mg, 4 mg, Intravenous, Q6H PRN    benzonatate (Tessalon) cap 200 mg, 200 mg, Oral, TID PRN    glycerin-hypromellose- (Artificial Tears) 0.2-0.2-1 % ophthalmic solution 1 drop, 1 drop, Both Eyes, QID PRN    sodium chloride (Saline Mist) 0.65 % nasal solution 1 spray, 1 spray, Each Nare, Q3H PRN    amiodarone (Pacerone) tab 200 mg, 200 mg, Oral, Daily    pantoprazole (Protonix) DR tab 40 mg, 40 mg, Oral, QAM AC    tamsulosin (Flomax) cap 0.4 mg, 0.4 mg, Oral, Daily    Intake/Output:    Intake/Output Summary (Last 24 hours) at 2/9/2025 0911  Last data filed at 2/9/2025 0611  Gross per 24 hour   Intake 600 ml   Output 1400 ml   Net -800 ml      Body mass index is 24.44 kg/m².    Review of Systems  Review of Systems:   A comprehensive 10 point review of systems was completed.  Pertinent positives and negatives noted in the the HPI.         Patient Vitals for the past 24 hrs:   BP Temp Temp src Pulse Resp SpO2 Weight   02/07/25 1138 114/52 98.4 °F (36.9 °C) Oral 88 18 96 % --   02/07/25 0823 97/45 98.6 °F (37 °C) Oral 72 20 98 % --   02/07/25 0553 -- -- -- -- -- -- 157 lb 3 oz (71.3 kg)   02/07/25 0530 122/54 98.9 °F (37.2 °C) Oral 79 18 93 % --   02/06/25 2300 120/48 98 °F (36.7 °C) Oral 69 20 93 % --   02/06/25 2156 -- -- -- 91 -- (!) 88 % --   02/06/25 1917 134/56 98.9 °F (37.2 °C) Oral 75 22 93 % --   02/06/25 1534 100/54 98.5 °F (36.9 °C) Oral 94 22 95 % --     Vitals:    02/08/25 2225 02/08/25 2355 02/09/25 0430 02/09/25 0805   BP:  110/63 120/74 100/85   BP Location:  Right arm Right arm Right arm   Pulse: 79 77 97 90   Resp:  17 18 22   Temp:  97.8 °F (36.6 °C) 97.7 °F (36.5 °C) 97.5 °F (36.4 °C)    TempSrc:  Oral Oral Oral   SpO2: 93% 91% 90% 95%   Weight:   160 lb 11.5 oz (72.9 kg)    Height:             Physical Exam  Constitutional:       General: He is not in acute distress.     Appearance: Normal appearance. He is not ill-appearing or diaphoretic.   HENT:      Head: Normocephalic and atraumatic.      Nose: Nose normal. No congestion or rhinorrhea.      Mouth/Throat:      Mouth: Mucous membranes are moist.      Pharynx: Oropharynx is clear. No oropharyngeal exudate or posterior oropharyngeal erythema.      Comments: Mallampati class III palate   Eyes:      Extraocular Movements: Extraocular movements intact.      Pupils: Pupils are equal, round, and reactive to light.   Cardiovascular:      Rate and Rhythm: Normal rate.      Pulses: Normal pulses.      Heart sounds: Normal heart sounds. No murmur heard.  Pulmonary:      Effort: Pulmonary effort is normal. No respiratory distress.      Breath sounds: Normal breath sounds. No wheezing or rhonchi.   Chest:      Chest wall: No tenderness.   Abdominal:      General: Abdomen is flat. Bowel sounds are normal.      Palpations: Abdomen is soft.   Musculoskeletal:         General: Normal range of motion.   Skin:     General: Skin is warm.   Neurological:      General: No focal deficit present.      Mental Status: He is alert and oriented to person, place, and time.   Psychiatric:         Mood and Affect: Mood normal.         Behavior: Behavior normal.         Thought Content: Thought content normal.         Judgment: Judgment normal.            Lab Data Review:  Recent Labs   Lab 02/06/25  0415 02/08/25  0542 02/09/25  0546   WBC 8.9 18.1* 15.5*   HGB 11.2* 10.3* 10.4*   HCT 32.2* 29.8* 30.7*   .0 222.0 298.0   EOS 0 0 1       Recent Labs   Lab 02/04/25  0115 02/04/25  1436 02/07/25  0509 02/08/25  0542 02/09/25  0546   *   < > 132* 134* 134*   K 3.4*   < > 3.1* 3.4*  3.4* 3.9  3.9   CL 99   < > 94* 96* 98   CO2 25.0   < > 32.0 29.0 25.0   BUN 19    < > 18 22 13   CREATSERUM 0.71   < > 0.74 0.78 0.65*   CA 8.9   < > 8.6* 8.3* 8.6*   ALB 3.4  --   --  3.2  --    ALKPHO 37*  --   --  47  --    ALT 25  --   --  11  --    AST 49*  --   --  65*  --    *   < > 109* 155* 117*    < > = values in this interval not displayed.       Recent Labs   Lab 02/07/25  0509 02/08/25  0542 02/09/25  0546   MG 1.8 1.8 1.9       Lab Results   Component Value Date    PHOS 2.8 02/06/2025        No results for input(s): \"PT\", \"INR\", \"PTT\" in the last 168 hours.      No results for input(s): \"ABGPHT\", \"EFHNEV6X\", \"HRUGN2Z\", \"ABGHCO3\", \"ABGBE\", \"TEMP\", \"SIVAKUMAR\", \"SITE\", \"DEV\", \"THGB\" in the last 168 hours.    No results for input(s): \"TROP\", \"CKMB\" in the last 168 hours.    Cultures:   Hospital Encounter on 01/31/25   1. Blood Culture     Status: None (Preliminary result)    Collection Time: 02/07/25  7:12 PM    Specimen: Blood,peripheral   Result Value Ref Range    Blood Culture Result No Growth 1 Day N/A   2. Urine Culture, Routine     Status: Abnormal    Collection Time: 01/31/25  1:39 AM    Specimen: Urine, clean catch   Result Value Ref Range    Urine Culture >100,000 CFU/ML Escherichia coli (A) N/A       Susceptibility    Escherichia coli -  (no method available)     Ampicillin 4 Sensitive      Cefazolin <=4 Sensitive      Ciprofloxacin <=0.25 Sensitive      Gentamicin <=1 Sensitive      Meropenem <=0.25 Sensitive      Levofloxacin <=0.12 Sensitive      Nitrofurantoin <=16 Sensitive      Piperacillin + Tazobactam <=4 Sensitive      Trimethoprim/Sulfa <=20 Sensitive            Radiology personally reviewed:  XR CHEST AP PORTABLE  (CPT=71045)    Result Date: 2/9/2025  CONCLUSION:  1. No significant improvement in extensive bilateral left worse than right infiltrates.  Findings are again concerning for pneumonia.  Superimposed pulmonary edema is not excluded. 2. Stable small left pleural effusion.    LOCATION:  [facility]      Dictated by (CST): Brendon Shannon MD on 2/09/2025 at  5:24 AM     Finalized by (CST): Berndon Shannon MD on 2/09/2025 at 5:26 AM       XR CHEST AP PORTABLE  (CPT=71045)    Result Date: 2/8/2025  CONCLUSION:  Worsening airspace consolidation in the left upper and mid lung field and slight worsening patchy airspace infiltrate in the right perihilar lung.  Findings are concerning for pneumonia.  Superimposed pulmonary edema is not excluded.  The infiltrates should be followed to resolution to exclude any underlying lung lesion.   LOCATION:  [facility]      Dictated by (CST): Brendon Shannon MD on 2/08/2025 at 7:36 AM     Finalized by (CST): Brendon Shannon MD on 2/08/2025 at 7:38 AM                   Patient Active Problem List   Diagnosis    Trochanteric bursitis of left hip    Disorientation    Sepsis due to undetermined organism (HCC)    Sepsis with hypotension (HCC)    Hyponatremia    Thrombocytopenia    Metabolic acidosis    Pyelonephritis     85 year old male with history of hypertension, hyperlipidemia, hypothyroidism, CVA,, atrial fibrillation, coronary artery disease status post CABG.    The patient was admitted on 1/31/2025 with septic shock due to UTI with E. coli.  The patient was admitted to ICU and later transferred to the floor.    Assessment:  Acute hypoxic respiratory failure: Suspect due to pulmonary edema with A-fib RVR, versus influenza A and left lower lobe atelectasis: Requiring supplemental oxygen 2 L/min by nasal cannula  Influenza + PCR  Left upper lobe peripheral consolidation: Suspect related to pneumonia versus atelectasis: Appears to have increased aeration of the left upper lobe with reduced interstitial opacities.  Bilateral interstitial edema pattern cardiogenic versus noncardiogenic noted.    Left lower lobe atelectasis on chest x-ray 2/6/2025: Radiologically improving  E. coli UTI sepsis with  septic shock recently requiring vasopressors on 1/31/2025 resolved  Chronic atrial fibrillation with rapid ventricular response and heart rate  of 120s, on chronic anticoagulation  Hypertension  Hyperlipidemia  Hypothyroidism  Coronary artery disease status post CABG  History of CVA with no deficits  BPH        Plan:  Wean FiO2 to keep oxygen saturation between 90 to 92%  Incentive spirometry every 4 hours as possible  Follow-up chest x-ray  Continue Tamiflu 75 mg oral twice daily for 5 days  Infectious disease follow-up  Zosyn 3.375 g IV every 8 hours per infectious disease  DVT prophylaxis: SCD boots  /   apixaban  GI prophylaxis: Protonix 40 mg daily  Will follow for further recommendations  Follow labs and chest x-ray    Thank You for allowing me to participate in this patient's care     Justin Arechiga MD        Note to the patient: The 21st Century Cures Act makes medical notes like these available to patients in the interest of transparency. However, be advised that this is a medical document. It is intended as peer to peer communication. It is written in medical language and may contain abbreviations or verbiage that are unfamiliar. It may appear blunt or direct. Medical documents are intended to carry relevant information, facts as evident, and clinical opinion of the practitioner.      Disclaimer: Components of this note were documented using voice recognition system and are subject to errors not corrected at proofreading. Contact the author of this note for any clarifications         [1] No Known Allergies

## 2025-02-09 NOTE — PLAN OF CARE
Pt A&Ox4. Glasses. Droplet isolation for influenza. Pt with crackles and rhonchi in lungs. Nonproductive cough. PRN Robitussin with codeine given. Need to collect sputum still. Pt unable to get productive cough up. Pt requiring 2 L NC at this time. . Encouraged long slow deep inhales with IS. Afib on tele. BM 02/09. Primo for urinary incontinence/ accurate I&O. Pt with bilat arm skin tears. Mepilex's in place for skin protection. Mepilex on sacrum for protection. Pt ambulates with one assist and walker. Pt currently up in chair watching [employer]. Iv Zosyn q8. Chair alarm on for patient safety.        Problem: Patient/Family Goals  Goal: Patient/Family Long Term Goal  Description: Patient's Long Term Goal: dc to [facility]    Interventions:  - pt/ot  - pain control  - off oxygen  - See additional Care Plan goals for specific interventions  Outcome: Progressing  Goal: Patient/Family Short Term Goal  Description: Patient's Short Term Goal: pain control    Interventions:   - pharm and nonpharm interventions  - See additional Care Plan goals for specific interventions  Outcome: Progressing     Problem: CARDIOVASCULAR - ADULT  Goal: Maintains optimal cardiac output and hemodynamic stability  Description: INTERVENTIONS:  - Monitor vital signs, rhythm, and trends  - Monitor for bleeding, hypotension and signs of decreased cardiac output  - Evaluate effectiveness of vasoactive medications to optimize hemodynamic stability  - Monitor arterial and/or venous puncture sites for bleeding and/or hematoma  - Assess quality of pulses, skin color and temperature  - Assess for signs of decreased coronary artery perfusion - ex. Angina  - Evaluate fluid balance, assess for edema, trend weights  Outcome: Progressing     Problem: RESPIRATORY - ADULT  Goal: Achieves optimal ventilation and oxygenation  Description: INTERVENTIONS:  - Assess for changes in respiratory status  - Assess for changes in mentation and behavior  - Position to  facilitate oxygenation and minimize respiratory effort  - Oxygen supplementation based on oxygen saturation or ABGs  - Provide Smoking Cessation handout, if applicable  - Encourage broncho-pulmonary hygiene including cough, deep breathe, Incentive Spirometry  - Assess the need for suctioning and perform as needed  - Assess and instruct to report SOB or any respiratory difficulty  - Respiratory Therapy support as indicated  - Manage/alleviate anxiety  - Monitor for signs/symptoms of CO2 retention  Outcome: Progressing     Problem: RISK FOR INFECTION - ADULT  Goal: Absence of fever/infection during anticipated neutropenic period  Description: INTERVENTIONS  - Monitor WBC  - Administer growth factors as ordered  - Implement neutropenic guidelines  Outcome: Progressing

## 2025-02-09 NOTE — PLAN OF CARE
A&Ox4. Droplet isolation for influenza. Patient on 2 liters NC. Continuous pulse ox maintained. Afib on telemetry. Pt denies chest pain or shortness of breath. Patient incontinent of bowel and bladder. Primo fit in place. Pain managed with prn pain medication. Skin tears on bilateral arms. Scattered bruising. Sacrum red with mepilex in place Patient in bed with fall precautions in place. Discussed plan of care with patient. Patient verbalizes understanding.    Plan of care: wean o2, IV antibiotics, tamiflu, IS, xray 02/9.    Problem: Patient/Family Goals  Goal: Patient/Family Long Term Goal  Description: Patient's Long Term Goal: dc to [facility]    Interventions:  - pt/ot  - pain control  - off oxygen  - See additional Care Plan goals for specific interventions  2/9/2025 0046 by Va Painter RN  Outcome: Progressing  2/9/2025 0026 by Va Painter RN  Outcome: Progressing  Goal: Patient/Family Short Term Goal  Description: Patient's Short Term Goal: pain control    Interventions:   - pharm and nonpharm interventions  - See additional Care Plan goals for specific interventions  2/9/2025 0046 by Va Painter, RN  Outcome: Progressing  2/9/2025 0026 by Va Painter RN  Outcome: Progressing     Problem: CARDIOVASCULAR - ADULT  Goal: Maintains optimal cardiac output and hemodynamic stability  Description: INTERVENTIONS:  - Monitor vital signs, rhythm, and trends  - Monitor for bleeding, hypotension and signs of decreased cardiac output  - Evaluate effectiveness of vasoactive medications to optimize hemodynamic stability  - Monitor arterial and/or venous puncture sites for bleeding and/or hematoma  - Assess quality of pulses, skin color and temperature  - Assess for signs of decreased coronary artery perfusion - ex. Angina  - Evaluate fluid balance, assess for edema, trend weights  2/9/2025 0046 by Va Painter, RN  Outcome: Progressing  2/9/2025 0026 by Va Painter,  RN  Outcome: Progressing     Problem: RESPIRATORY - ADULT  Goal: Achieves optimal ventilation and oxygenation  Description: INTERVENTIONS:  - Assess for changes in respiratory status  - Assess for changes in mentation and behavior  - Position to facilitate oxygenation and minimize respiratory effort  - Oxygen supplementation based on oxygen saturation or ABGs  - Provide Smoking Cessation handout, if applicable  - Encourage broncho-pulmonary hygiene including cough, deep breathe, Incentive Spirometry  - Assess the need for suctioning and perform as needed  - Assess and instruct to report SOB or any respiratory difficulty  - Respiratory Therapy support as indicated  - Manage/alleviate anxiety  - Monitor for signs/symptoms of CO2 retention  2/9/2025 0046 by Va Painter RN  Outcome: Progressing  2/9/2025 0026 by Va Painter, RN  Outcome: Progressing     Problem: RISK FOR INFECTION - ADULT  Goal: Absence of fever/infection during anticipated neutropenic period  Description: INTERVENTIONS  - Monitor WBC  - Administer growth factors as ordered  - Implement neutropenic guidelines  2/9/2025 0046 by Va Painter, RN  Outcome: Progressing  2/9/2025 0026 by Va Painter, RN  Outcome: Progressing

## 2025-02-09 NOTE — PROGRESS NOTES
[facility] PROGRESS NOTE      Jose Alfredo Solis Patient Status:  Inpatient    1940 MRN [id number]   Location [facility] 8NE-A Attending Jovanni Villarreal MD   Hosp Day # 9 PCP MIKAYLA BAUGH, MD ROSEANN     Assessment/Plan   Assessment:    Afib. Rate controlled  CAD s/p CABG  HTN  DLP  Acute hypoxic respiratory failure secondary to influezna/PNA     Plan:  -Afib with RVR. Triggered by hypoxia, and PNA/sepsis. Rate control is reasonable today, continue metoprolol, continue amiodarone/eliquis  -focus on management of pulmonary process. Would continue gentle diuresis if needed, though pulmonary edema is not likely to be the main contributor to hypoxia based on the CXR pattern, and the diastolic function parameters on TTE     Discussed with patient.       Subjective:  Breathing is about the same as yesterday.   ROS:  No abdominal pain, no cough, no fevers, chills, denies musculoskeletal pain.     Objective:  /85 (BP Location: Right arm)   Pulse 90   Temp 97.5 °F (36.4 °C) (Oral)   Resp 22   Ht 68\"   Wt 160 lb 11.5 oz (72.9 kg)   SpO2 95%   BMI 24.44 kg/m²     Temp (24hrs), Av.2 °F (36.8 °C), Min:97.5 °F (36.4 °C), Max:99.3 °F (37.4 °C)      Intake/Output:    Intake/Output Summary (Last 24 hours) at 2025 1121  Last data filed at 2025 0611  Gross per 24 hour   Intake 600 ml   Output 1400 ml   Net -800 ml       Wt Readings from Last 2 Encounters:   25 160 lb 11.5 oz (72.9 kg)   18 160 lb (72.6 kg)       Physical Exam:    General: Alert,  No apparent distress.  HEENT: No focal deficits.  Neck: No JVD, carotids 2+ no bruits.  Cardiac: Regular rate and rhythm, S1, S2 normal, no murmur  Lungs: Clear without wheezes, rales, rhonchi.  Normal excursions and effort.  Abdomen: Soft, non-tender.   Extremities: Without clubbing, cyanosis or edema.  Peripheral pulses are 2+.  Skin: Warm and dry.     Labs:  Lab Results   Component Value Date    WBC 15.5  02/09/2025    HGB 10.4 02/09/2025    HCT 30.7 02/09/2025    .0 02/09/2025     Lab Results   Component Value Date    INR 1.39 (H) 02/02/2025     Lab Results   Component Value Date     02/09/2025    K 3.9 02/09/2025    K 3.9 02/09/2025    CL 98 02/09/2025    CO2 25.0 02/09/2025    BUN 13 02/09/2025    CREATSERUM 0.65 02/09/2025     02/09/2025    MG 1.9 02/09/2025    CA 8.6 02/09/2025        No results found for: \"TROP\", \"CKMB\"     Medications:     metoprolol succinate ER  50 mg Oral 2x Daily(Beta Blocker)    piperacillin-tazobactam  3.375 g Intravenous Q8H    oseltamivir  75 mg Oral BID    aspirin  81 mg Oral Daily    atorvastatin  20 mg Oral Nightly    apixaban  5 mg Oral BID    amiodarone  200 mg Oral Daily    pantoprazole  40 mg Oral QAM AC    tamsulosin  0.4 mg Oral Daily      dilTIAZem Stopped (02/08/25 0509)    sodium chloride 100 mL/hr at 02/08/25 0222       Jia Hi MD  2/9/2025  11:21 AM    Patient Active Problem List   Diagnosis    Trochanteric bursitis of left hip    Disorientation    Sepsis due to undetermined organism (HCC)    Sepsis with hypotension (HCC)    Hyponatremia    Thrombocytopenia    Metabolic acidosis    Pyelonephritis

## 2025-02-09 NOTE — PROGRESS NOTES
Assumed care of patient ~1530. Patient is Aox3-4; forgetful at times. On 2L NC. Afib on tele. Incontinent at times of bowel and bladder. Up with x1 w/ walker. No complaints of pain, shortness of breath, chest pain/discomfort. POC: wean O2 as able, IV abx, encourage IS, wound care to see for skin tears.

## 2025-02-10 ENCOUNTER — APPOINTMENT (OUTPATIENT)
Dept: GENERAL RADIOLOGY | Facility: HOSPITAL | Age: 85
End: 2025-02-10
Attending: INTERNAL MEDICINE
Payer: MEDICARE

## 2025-02-10 PROBLEM — J18.9 PNEUMONIA OF LEFT UPPER LOBE DUE TO INFECTIOUS ORGANISM: Status: ACTIVE | Noted: 2025-02-10

## 2025-02-10 LAB
ANION GAP SERPL CALC-SCNC: 7 MMOL/L (ref 0–18)
BASOPHILS # BLD AUTO: 0.03 X10(3) UL (ref 0–0.2)
BASOPHILS NFR BLD AUTO: 0.2 %
BUN BLD-MCNC: 13 MG/DL (ref 9–23)
CALCIUM BLD-MCNC: 8.6 MG/DL (ref 8.7–10.6)
CHLORIDE SERPL-SCNC: 99 MMOL/L (ref 98–112)
CO2 SERPL-SCNC: 27 MMOL/L (ref 21–32)
CREAT BLD-MCNC: 0.63 MG/DL
EGFRCR SERPLBLD CKD-EPI 2021: 92 ML/MIN/1.73M2 (ref 60–?)
EOSINOPHIL # BLD AUTO: 0.08 X10(3) UL (ref 0–0.7)
EOSINOPHIL NFR BLD AUTO: 0.5 %
ERYTHROCYTE [DISTWIDTH] IN BLOOD BY AUTOMATED COUNT: 14.7 %
GLUCOSE BLD-MCNC: 158 MG/DL (ref 70–99)
HCT VFR BLD AUTO: 27.8 %
HGB BLD-MCNC: 9.5 G/DL
IMM GRANULOCYTES # BLD AUTO: 0.47 X10(3) UL (ref 0–1)
IMM GRANULOCYTES NFR BLD: 2.8 %
LYMPHOCYTES # BLD AUTO: 1.53 X10(3) UL (ref 1–4)
LYMPHOCYTES NFR BLD AUTO: 9 %
MCH RBC QN AUTO: 30.3 PG (ref 26–34)
MCHC RBC AUTO-ENTMCNC: 34.2 G/DL (ref 31–37)
MCV RBC AUTO: 88.5 FL
MONOCYTES # BLD AUTO: 2.72 X10(3) UL (ref 0.1–1)
MONOCYTES NFR BLD AUTO: 16.1 %
NEUTROPHILS # BLD AUTO: 12.08 X10 (3) UL (ref 1.5–7.7)
NEUTROPHILS # BLD AUTO: 12.08 X10(3) UL (ref 1.5–7.7)
NEUTROPHILS NFR BLD AUTO: 71.4 %
OSMOLALITY SERPL CALC.SUM OF ELEC: 279 MOSM/KG (ref 275–295)
PLATELET # BLD AUTO: 342 10(3)UL (ref 150–450)
POTASSIUM SERPL-SCNC: 3.8 MMOL/L (ref 3.5–5.1)
RBC # BLD AUTO: 3.14 X10(6)UL
SODIUM SERPL-SCNC: 133 MMOL/L (ref 136–145)
STREP PNEUMO ANTIGEN, URINE: NEGATIVE
WBC # BLD AUTO: 16.9 X10(3) UL (ref 4–11)

## 2025-02-10 PROCEDURE — 99232 SBSQ HOSP IP/OBS MODERATE 35: CPT | Performed by: HOSPITALIST

## 2025-02-10 PROCEDURE — 71045 X-RAY EXAM CHEST 1 VIEW: CPT | Performed by: INTERNAL MEDICINE

## 2025-02-10 PROCEDURE — 99233 SBSQ HOSP IP/OBS HIGH 50: CPT | Performed by: INTERNAL MEDICINE

## 2025-02-10 RX ORDER — CEPHALEXIN 500 MG/1
500 CAPSULE ORAL 4 TIMES DAILY
Status: DISCONTINUED | OUTPATIENT
Start: 2025-02-10 | End: 2025-02-13

## 2025-02-10 NOTE — PROGRESS NOTES
[facility]   part of [facility]     Hospitalist Progress Note     Jose Alfredo Solis Patient Status:  Inpatient    1940 MRN [id number]   Location [facility] 4SW-A Attending Henna Edge MD   Hosp Day # 10 PCP MIKAYLA BAUGH, MD ROSEANN     Chief Complaint: Weakness     Subjective:     Still on 2L. Briefly in afib o/n causing palpitations. Resolved spontaneously.     Objective:    Review of Systems:   A comprehensive review of systems was completed; pertinent positive and negatives stated in subjective.    Vital signs:  Temp:  [97.5 °F (36.4 °C)-98.7 °F (37.1 °C)] 98 °F (36.7 °C)  Pulse:  [] 71  Resp:  [18-22] 21  BP: (130-146)/(52-86) 139/52  SpO2:  [90 %-97 %] 94 %    Physical Exam:    General: No acute distress  Respiratory: No wheezes, no rhonchi, congested  Cardiovascular: S1, S2, regular rate and rhythm  Abdomen: Soft, Non-tender, non-distended, positive bowel sounds  Neuro: No new focal deficits.   Extremities: No edema      Diagnostic Data:    Labs:  Recent Labs   Lab 25  0115 25  0415 25  0542 25  0546 02/10/25  0454   WBC 10.0 8.9 18.1* 15.5* 16.9*   HGB 10.2* 11.2* 10.3* 10.4* 9.5*   MCV 89.0 89.4 88.7 91.1 88.5   .0* 171.0 222.0 298.0 342.0   BAND  --  2 10 1  --        Recent Labs   Lab 25  0115 25  1436 25  0542 25  0546 02/10/25  0454   *   < > 155* 117* 158*   BUN 19   < > 22 13 13   CREATSERUM 0.71   < > 0.78 0.65* 0.63*   CA 8.9   < > 8.3* 8.6* 8.6*   ALB 3.4  --  3.2  --   --    *   < > 134* 134* 133*   K 3.4*   < > 3.4*  3.4* 3.9  3.9 3.8   CL 99   < > 96* 98 99   CO2 25.0   < > 29.0 25.0 27.0   ALKPHO 37*  --  47  --   --    AST 49*  --  65*  --   --    ALT 25  --  11  --   --    BILT 0.7  --  1.4*  --   --    TP 5.6*  --  5.6*  --   --     < > = values in this interval not displayed.       Estimated Glomerular Filtration Rate: 92.1 mL/min/1.73m2 (A) (by CKD-EPI based on SCr of 0.63 mg/dL  (L)).    No results for input(s): \"TROP\", \"TROPHS\", \"CK\" in the last 168 hours.      No results for input(s): \"PTP\", \"INR\" in the last 168 hours.                 Microbiology    Hospital Encounter on 01/31/25   1. Blood Culture     Status: None (Preliminary result)    Collection Time: 02/07/25  7:12 PM    Specimen: Blood,peripheral   Result Value Ref Range    Blood Culture Result No Growth 2 Days N/A   2. Urine Culture, Routine     Status: Abnormal    Collection Time: 01/31/25  1:39 AM    Specimen: Urine, clean catch   Result Value Ref Range    Urine Culture >100,000 CFU/ML Escherichia coli (A) N/A       Susceptibility    Escherichia coli -  (no method available)     Ampicillin 4 Sensitive      Cefazolin <=4 Sensitive      Ciprofloxacin <=0.25 Sensitive      Gentamicin <=1 Sensitive      Meropenem <=0.25 Sensitive      Levofloxacin <=0.12 Sensitive      Nitrofurantoin <=16 Sensitive      Piperacillin + Tazobactam <=4 Sensitive      Trimethoprim/Sulfa <=20 Sensitive          Imaging: Reviewed in Epic.    Medications:    metoprolol succinate ER  50 mg Oral 2x Daily(Beta Blocker)    piperacillin-tazobactam  3.375 g Intravenous Q8H    oseltamivir  75 mg Oral BID    aspirin  81 mg Oral Daily    atorvastatin  20 mg Oral Nightly    apixaban  5 mg Oral BID    amiodarone  200 mg Oral Daily    pantoprazole  40 mg Oral QAM AC    tamsulosin  0.4 mg Oral Daily       Assessment & Plan:      #Septic shock due to Ecoli UTI and bacteremia- shock resolved, off pressors  Repeat blood Cx 2/1 NGTD   IV Abx: Ancef   ID on Cs-IV Abx, dc on PO     #recurrent sepsis 2/7  #2/2 suspect gram negative PNA  #acute pulm edema as well  #acute respiratory failure w/ hypoxia  #influenza A  Gave IVF back when septic. No sepsis bolus due to recent FOL  BNP elevated  Cont to try to wean O2. Still on 2L. May need to DC w/ home O2. O2 walk eval  ECHO unremarkable  D/w ID, expanded abx to zosyn  bcxs ngtd  Consulted pulm   tamiflu     #Afib, permanent w/w  RVR  BB  DOAC, Amio  In [facility], 2/2 sepsis. HR much improved now that improved  Cards consulted. Dc cardizem gtt  Briefly in afib again o/n     #h/o CAD s/p CABG  #Hypothyroid  #Hypertension  #HLD    Dispo: possibly home in next 1-2d. Possibly w/ home o2    Jovanni Villarreal MD    Supplementary Documentation:     Quality:  DVT Mechanical Prophylaxis:   SCDs, Early ambuation  DVT Pharmacologic Prophylaxis   Medication    apixaban (Eliquis) tab 5 mg         DVT Pharmacologic prophylaxis: Aspirin 81 mg      Code Status: Full Code  Garnica: External urinary catheter in place  Garnica Duration (in days):   Central line:    CARMINA: 2/11/2025    Discharge is dependent on: clinical   At this point Mr. Solis is expected to be discharge to: tbd     The 21st Century Cures Act makes medical notes like these available to patients in the interest of transparency. Please be advised this is a medical document. Medical documents are intended to carry relevant information, facts as evident, and the clinical opinion of the practitioner. The medical note is intended as peer to peer communication and may appear blunt or direct. It is written in medical language and may contain abbreviations or verbiage that are unfamiliar.              **Certification      PHYSICIAN Certification of Need for Inpatient Hospitalization - Initial Certification    Patient will require inpatient services that will reasonably be expected to span two midnight's based on the clinical documentation in H+P.   Based on patients current state of illness, I anticipate that, after discharge, patient will require TBD.

## 2025-02-10 NOTE — PROGRESS NOTES
Progress Note  Jose Alfredo ANDREA Solis Patient Status:  Inpatient    1940 MRN [id number]   Location [facility] 8NE-A Attending Jovanni Villarreal MD   Hosp Day # 10 PCP MIKAYLA BAUGH, MD ROSEANN     Subjective:  Pt with nasal congestion, cough, SOB. C/O palpitations last night, early am -now resolved.     Objective:  /60 (BP Location: Right arm)   Pulse 93   Temp 98 °F (36.7 °C) (Oral)   Resp 18   Ht 5' 8\" (1.727 m)   Wt 163 lb 12.8 oz (74.3 kg)   SpO2 96%   BMI 24.91 kg/m²     Telemetry: a paced, v sensed 70bpm; PAF last night    Intake/Output:    Intake/Output Summary (Last 24 hours) at 2/10/2025 1152  Last data filed at 2/10/2025 0900  Gross per 24 hour   Intake 960 ml   Output 450 ml   Net 510 ml       Last 3 Weights   02/10/25 0456 163 lb 12.8 oz (74.3 kg)   25 0430 160 lb 11.5 oz (72.9 kg)   25 0447 161 lb 6 oz (73.2 kg)   25 0553 157 lb 3 oz (71.3 kg)   25 0600 164 lb 7.4 oz (74.6 kg)   25 0200 164 lb 0.4 oz (74.4 kg)   25 0004 167 lb 5.3 oz (75.9 kg)   25 0000 159 lb 9.8 oz (72.4 kg)   25 0450 158 lb 4.6 oz (71.8 kg)   25 0146 167 lb 15.9 oz (76.2 kg)   18 0956 160 lb (72.6 kg)       Labs:  Recent Labs   Lab 25  0542 25  0546 02/10/25  0454   * 117* 158*   BUN 22 13 13   CREATSERUM 0.78 0.65* 0.63*   EGFRCR 86 91 92   CA 8.3* 8.6* 8.6*   * 134* 133*   K 3.4*  3.4* 3.9  3.9 3.8   CL 96* 98 99   CO2 29.0 25.0 27.0     Recent Labs   Lab 25  0542 25  0546 02/10/25  0454   RBC 3.36* 3.37* 3.14*   HGB 10.3* 10.4* 9.5*   HCT 29.8* 30.7* 27.8*   MCV 88.7 91.1 88.5   MCH 30.7 30.9 30.3   MCHC 34.6 33.9 34.2   RDW 14.6 14.8 14.7   NEPRELIM 13.08* 10.07* 12.08*   WBC 18.1* 15.5* 16.9*   .0 298.0 342.0         No results for input(s): \"TROP\", \"TROPHS\", \"CK\" in the last 168 hours.    Diagnostics:  XR CHEST AP PORTABLE  (CPT=71045)    Result Date: 2/10/2025  CONCLUSION:  Postoperative changes of  cardiothoracic surgery with stable cardiac and mediastinal contours.  Stable positioning of left chest wall ICD.  Moderate, diffuse interstitial opacities noted throughout the lungs with patchy alveolar component of the left mid/upper lung, slightly progressed in the interim.  Findings may reflect pulmonary edema or infectious/inflammatory process.  No associated pleural effusion or pneumothorax.   LOCATION:  [id number]      Dictated by (CST): Lamar Mendes MD on 2/10/2025 at 7:33 AM     Finalized by (CST): Lamar Mendes MD on 2/10/2025 at 7:34 AM       Review of Systems   Constitutional: Positive for malaise/fatigue.   Cardiovascular:  Positive for palpitations. Negative for chest pain, dyspnea on exertion and leg swelling.   Respiratory:  Positive for cough and shortness of breath.        Physical Exam:    Gen: alert, oriented x 3, NAD  Heent: pupils equal, reactive. Mucous membranes moist.   Neck: no jvd  Cardiac: regular rate and rhythm, normal S1,S2, no murmur, clicks, rub or gallop  Lungs: bilateral crackles  Abd: soft, NT/ND +bs  Ext: no edema  Skin: Warm, dry  Neuro: No focal deficits      Medications:     metoprolol succinate ER  50 mg Oral 2x Daily(Beta Blocker)    piperacillin-tazobactam  3.375 g Intravenous Q8H    oseltamivir  75 mg Oral BID    aspirin  81 mg Oral Daily    atorvastatin  20 mg Oral Nightly    apixaban  5 mg Oral BID    amiodarone  200 mg Oral Daily    pantoprazole  40 mg Oral QAM AC    tamsulosin  0.4 mg Oral Daily      dilTIAZem Stopped (02/08/25 0509)    sodium chloride 100 mL/hr at 02/08/25 0222     Assessment:  Acute Hypoxic Respiratory Failure - Influenza/Possible Superimposed PNA  On 1-2 L O2  On tamiflu, antibx  Pulm following  Sepsis d/t UTI, E Coli Bacteremia  IV antibx  Repeat BC NGTD  ID following  Paroxysmal Atrial Fibrillation, Hx [employer] DC PPM  Initially w/RVR on IV diltiazem; now rate controlled - currently a paced on tele   Echo w/LVEF 55-60%, no RWMA, normal diastolic  function, mod increased LA volume, mild AR, mild MR, PASP 34mmHg, IVC normal  On amiodarone 200mg po daily, Toprol 50mg po BID  [id number] A/C with Eliquis 5mg po BID  Follows w/[facility] as OP  Hx CAD s/p CABG  On asa, bb, statin  WALTER - now improved  Cr 1.50 on admit, now improved  ACEI on hold   Hypertension  On toprol; enalapril on hold  Hyperlipidemia - on atorvastatin 20mg     Plan:  Continue po amiodarone, toprol  Continue Eliquis for stroke prophylaxis  Continue asa, statin  Supportive care for influenza per primary     Plan of care discussed with patient, RN.    Leatha Bonilla, APRN  2/10/2025  11:52 AM  [phone] [facility]  [phone] [facility]

## 2025-02-10 NOTE — PROGRESS NOTES
[facility] Progress Note     SUBJECTIVE/Interval history:  No acute events overnight, he reports feeling worse earlier this am due to afib/RVR. Now feels better. Denies dyspnea or cough now. No pain    Review of Systems:   A comprehensive 14 point review of systems was completed.   Pertinent positives and negatives noted in the HPI.    Medications  Reviewed personally   metoprolol succinate ER  50 mg Oral 2x Daily(Beta Blocker)    piperacillin-tazobactam  3.375 g Intravenous Q8H    oseltamivir  75 mg Oral BID    aspirin  81 mg Oral Daily    atorvastatin  20 mg Oral Nightly    apixaban  5 mg Oral BID    amiodarone  200 mg Oral Daily    pantoprazole  40 mg Oral QAM AC    tamsulosin  0.4 mg Oral Daily       guaiFENesin-codeine    dilTIAZem    metoclopramide    acetaminophen    melatonin    polyethylene glycol (PEG 3350)    sennosides    bisacodyl    ondansetron    benzonatate    glycerin-hypromellose-    sodium chloride    OBJECTIVE:  Vitals:    02/09/25 2323 02/10/25 0130 02/10/25 0328 02/10/25 0456   BP: 137/86  130/73    BP Location: Right arm  Right arm    Pulse: 108  80    Resp:   20    Temp: 98.2 °F (36.8 °C)  98.7 °F (37.1 °C)    TempSrc: Oral  Oral    SpO2: 90% 90% 91%    Weight:    163 lb 12.8 oz (74.3 kg)   Height:           Vital signs in last 24 hours:  Blood pressure 130/73, pulse 80, temperature 98.7 °F (37.1 °C), temperature source Oral, resp. rate 20, height 5' 8\" (1.727 m), weight 163 lb 12.8 oz (74.3 kg), SpO2 91%.     Intake/Output:  I/O last 3 completed shifts:  In: 960 [P.O.:960]  Out: 1400 [Urine:1400]       Physical Exam:  General: Appears alert, oriented x3. No acute distress  Neurologic:No focal deficits noted.  Alert.  Oriented.  Lungs:crackles on left. CTA on right  Heart:RRR no m  Abdomen: soft, nondistended, no rigidity, no reaction with palpation. No hernias noted  Extremities:No overt deformities, edema    Lab Data Review:   Recent Labs   Lab  02/08/25  0542 02/09/25  0546 02/10/25  0454   * 117* 158*   BUN 22 13 13   CREATSERUM 0.78 0.65* 0.63*   CA 8.3* 8.6* 8.6*   * 134* 133*   K 3.4*  3.4* 3.9  3.9 3.8   CL 96* 98 99   CO2 29.0 25.0 27.0     Recent Labs   Lab 02/08/25  0542 02/09/25  0546 02/10/25  0454   RBC 3.36* 3.37* 3.14*   HGB 10.3* 10.4* 9.5*   HCT 29.8* 30.7* 27.8*   MCV 88.7 91.1 88.5   MCH 30.7 30.9 30.3   MCHC 34.6 33.9 34.2   RDW 14.6 14.8 14.7   NEPRELIM 13.08* 10.07* 12.08*   WBC 18.1* 15.5* 16.9*   .0 298.0 342.0     No results for input(s): \"BNP\" in the last 168 hours.  No results for input(s): \"TROP\", \"CK\" in the last 168 hours.  No results for input(s): \"PT\", \"INR\", \"PTT\" in the last 168 hours.  No results for input(s): \"ABGPHT\", \"YXYNKP6T\", \"RTYCY7K\", \"ABGHCO3\", \"ABGBE\", \"TEMP\", \"SIVAKUMAR\", \"SITE\", \"DEV\", \"THGB\" in the last 168 hours.    Invalid input(s): \"PUK77GQV\", \"CHOB\"    Other Labs:  Interval Culture Data:   Hospital Encounter on 01/31/25   1. Blood Culture     Status: None (Preliminary result)    Collection Time: 02/07/25  7:12 PM    Specimen: Blood,peripheral   Result Value Ref Range    Blood Culture Result No Growth 2 Days N/A   2. Urine Culture, Routine     Status: Abnormal    Collection Time: 01/31/25  1:39 AM    Specimen: Urine, clean catch   Result Value Ref Range    Urine Culture >100,000 CFU/ML Escherichia coli (A) N/A       Susceptibility    Escherichia coli -  (no method available)     Ampicillin 4 Sensitive      Cefazolin <=4 Sensitive      Ciprofloxacin <=0.25 Sensitive      Gentamicin <=1 Sensitive      Meropenem <=0.25 Sensitive      Levofloxacin <=0.12 Sensitive      Nitrofurantoin <=16 Sensitive      Piperacillin + Tazobactam <=4 Sensitive      Trimethoprim/Sulfa <=20 Sensitive      No results for input(s): \"COLORUR\", \"CLARITY\", \"SPECGRAVITY\", \"GLUUR\", \"BILUR\", \"KETUR\", \"BLOODURINE\", \"PHURINE\", \"PROUR\", \"UROBILINOGEN\", \"NITRITE\", \"LEUUR\", \"WBCUR\", \"RBCUR\", \"BACUR\", \"EPIUR\" in the last 168  hours.    Interval Radiology:   Reviewed personally  XR CHEST AP PORTABLE  (CPT=71045)    Result Date: 2/10/2025  CONCLUSION:  Postoperative changes of cardiothoracic surgery with stable cardiac and mediastinal contours.  Stable positioning of left chest wall ICD.  Moderate, diffuse interstitial opacities noted throughout the lungs with patchy alveolar component of the left mid/upper lung, slightly progressed in the interim.  Findings may reflect pulmonary edema or infectious/inflammatory process.  No associated pleural effusion or pneumothorax.   LOCATION:  [id number]      Dictated by (CST): Lamar Mendes MD on 2/10/2025 at 7:33 AM     Finalized by (CST): Lamar Mendes MD on 2/10/2025 at 7:34 AM       XR CHEST AP PORTABLE  (CPT=71045)    Result Date: 2/9/2025  CONCLUSION:  1. No significant improvement in extensive bilateral left worse than right infiltrates.  Findings are again concerning for pneumonia.  Superimposed pulmonary edema is not excluded. 2. Stable small left pleural effusion.    LOCATION:  [facility]      Dictated by (CST): Brendon Shannon MD on 2/09/2025 at 5:24 AM     Finalized by (CST): Brendon Shannon MD on 2/09/2025 at 5:26 AM       XR CHEST AP PORTABLE  (CPT=71045)    Result Date: 2/8/2025  CONCLUSION:  Worsening airspace consolidation in the left upper and mid lung field and slight worsening patchy airspace infiltrate in the right perihilar lung.  Findings are concerning for pneumonia.  Superimposed pulmonary edema is not excluded.  The infiltrates should be followed to resolution to exclude any underlying lung lesion.   LOCATION:  [facility]      Dictated by (CST): Brendon Shannon MD on 2/08/2025 at 7:36 AM     Finalized by (CST): Brendon Shannon MD on 2/08/2025 at 7:38 AM       XR CHEST AP PORTABLE  (CPT=71045)    Result Date: 2/5/2025  CONCLUSION:  Postoperative changes of cardiothoracic surgery with stable cardiac and mediastinal contours.  Stable positioning of left chest wall ICD.  There  continues to be a perihilar distribution of interstitial opacities with increasing patchy alveolar component of the left upper lobe.  Pulmonary edema is favored, though an infectious/inflammatory process could have a similar appearance.  No associated pleural effusion or pneumothorax.   LOCATION:  [facility]      Dictated by (CST): Lamar Mendes MD on 2/05/2025 at 3:06 PM     Finalized by (CST): Lamar Mendes MD on 2/05/2025 at 3:08 PM       XR FOOT, COMPLETE (MIN 3 VIEWS), LEFT (CPT=73630)    Result Date: 2/4/2025  CONCLUSION:  Degenerative and arthritic changes are seen in the ankle and foot.  There is no focal bony destructive lesion seen.  No soft tissue gas identified.   LOCATION:  [facility]   Dictated by (CST): Brendon Shannon MD on 2/04/2025 at 1:28 PM     Finalized by (CST): rBendon Shannon MD on 2/04/2025 at 1:31 PM       XR VIDEO SWALLOW (CPT=74230)    Result Date: 2/3/2025  PROCEDURE:  XR VIDEO SWALLOW (CPT=74230)  TECHNIQUE:  Video fluoroscopic swallowing study was performed in cooperation with the speech pathologist.  Barium of varying consistencies was administered orally with patient in lateral projection.  COMPARISON:  None.  INDICATIONS:  c/f aspiration, CXR with ggo  PATIENT STATED HISTORY: (As transcribed by Technologist)  Patient offered no additional history at this time.   CINE CAPTURES:  5 FLUORSCOPY TIME:  55 seconds RADIATION DOSE (AIR KERMA PRODUCT):  203.7 uGy/m2   FINDINGS:  PHARYNGEAL PHASE:  Normal for age. ASPIRATION:  None. PENETRATION:  Transient penetration with thin barium by cup. OTHER:  Mild residual vallecular barium with the thicker consistencies.  PLEASE SEE SPEECH PATHOLOGIST REPORT FOR FULL ASSESSMENT OF SWALLOWING MECHANISM.   LOCATION:  [facility]    Dictated by (CST): Lamar eMndes MD on 2/03/2025 at 2:06 PM     Finalized by (CST): Lamar Mendes MD on 2/03/2025 at 2:06 PM       CT CHEST (CONTRAST ONLY) ABDOMEN (W+WO) PELVIS (W+) (CPT=71260/78701)    Result Date: 2/2/2025  CONCLUSION:    1. No evidence of pulmonary embolus.  2. Interlobular septal thickening along with pleural effusions is suggestive of fluid overload.  3. Ground-glass consolidations in the left lung are noted.  This may be due to infectious process.  However, this could also be due to pulmonary edema.  4. Cholelithiasis is incidentally noted.  An acute inflammatory process in the abdomen and pelvis is not identified.     LOCATION:  [place] [id number]   Dictated by (CST): Donato Maldonado MD on 2/02/2025 at 1:59 PM     Finalized by (CST): Donato Maldonado MD on 2/02/2025 at 2:06 PM       XR CHEST AP PORTABLE  (CPT=71045)    Result Date: 2/2/2025  CONCLUSION:  Findings are suggestive of increased fluid overload.   LOCATION:  [place] [id number]      Dictated by (CST): Donato Maldonado MD on 2/02/2025 at 12:11 PM     Finalized by (CST): Donato Maldonado MD on 2/02/2025 at 12:11 PM       XR CHEST AP PORTABLE  (CPT=71045)    Result Date: 1/31/2025  CONCLUSION:  No focal consolidation.  Please see details as above.   ED M.D. notified of these findings with preliminary radiology report from [facility] services.   LOCATION:  [facility]      Dictated by (CST): Saida Andrade MD on 1/31/2025 at 8:29 AM     Finalized by (CST): Saida Andrade MD on 1/31/2025 at 8:30 AM       XR CHEST AP PORTABLE  (CPT=71045)    Result Date: 1/31/2025  CONCLUSION:  Interval placement right internal jugular central venous catheter which is appropriately position.  There is no evidence of complication.   LOCATION:  [facility]      Dictated by (CST): Jordan Tilley MD on 1/31/2025 at 7:15 AM     Finalized by (CST): Jordan Tilley MD on 1/31/2025 at 7:16 AM       CT STROKE CTA BRAIN/CTA NECK (W IV)(CPT=70496/40454)    Result Date: 1/31/2025  CONCLUSION:  1. Encephalomalacia right frontal and parietal lobe and multifocal small vessel ischemic change are noted consistent with chronic ischemia. 2. Intracranial CT angiogram demonstrates no large vessel occlusion or aneurysm. 3. CT angiogram of neck  demonstrates no significant stenosis or dissection.   Preliminary report was reviewed and there is no significant discrepancy.    LOCATION:  [facility]   Dictated by (CST): Jordan Tilley MD on 1/31/2025 at 6:06 AM     Finalized by (CST): Jordan Tilley MD on 1/31/2025 at 6:10 AM       CT STROKE BRAIN (NO IV)(CPT=70450)    Result Date: 1/31/2025  CONCLUSION:  1. There are chronic ischemic changes with multifocal encephalomalacia right frontal and parietal lobe and periventricular small vessel ischemic change. 2. There is atrophy. 3. There is no specific evidence of an acute abnormality.  Preliminary report was reviewed and there is no significant discrepancy.    LOCATION:  [facility]   Dictated by (CST): Jordan Tilley MD on 1/31/2025 at 6:03 AM     Finalized by (CST): Jordan Tilley MD on 1/31/2025 at 6:06 AM        ASSESSMENT  Acute hypoxic respiratory failure due to pneumonia, pulmonary edema  Pneumonia, possible aspiration vs nosocomial  Influenza infection  Sepsis due to E coli bacteremia, UTI  Afib with RVR  Leukocytosis related to above  Shock related to sepsis, now resolved  CAD/CABG, HTN, HLD  Hx CVA    PLAN  Continue close monitoring of respiratory status, wean o2 for sats >89%  O2 walk prior to discharge  Continue abx -zosyn day 3, await cx  Complete tamiflu course day 3 of 5  Check strep urine ag  Mobilize as able, use IS  Cardiology following/managing afib  Discharge planning    Osmany Bang MD

## 2025-02-10 NOTE — PROGRESS NOTES
INFECTIOUS DISEASE  PROGRESS NOTE            [facility]    Jose Alfredo Solis Patient Status:  Inpatient    1940 MRN [id number]   [facility] 4SW-A Attending Henna Edge MD   Hosp Day # 10 PCP MIKAYLA BAUGH, MD ROSEANN     Antibiotics: #10  Zosyn #3-dc  PO cephalexin    Subjective:  Fever on  and tested pos for infiluenza, on tamiflu    Objective:  Temp:  [97.9 °F (36.6 °C)-98.7 °F (37.1 °C)] 98 °F (36.7 °C)  Pulse:  [] 71  Resp:  [18-21] 21  BP: (130-146)/(52-86) 139/52  SpO2:  [90 %-97 %] 94 %    General: awake alert  Vital signs:Temp:  [97.9 °F (36.6 °C)-98.7 °F (37.1 °C)] 98 °F (36.7 °C)  Pulse:  [] 71  Resp:  [18-21] 21  BP: (130-146)/(52-86) 139/52  SpO2:  [90 %-97 %] 94 %  HEENT: Moist mucous membranes. Extraocular muscles are intact.  Neck: supple no masses  Respiratory: Non labored, symmetric excursion, normal respirations  Abdomen: Soft, nontender, nondistended.   Musculoskeletal: joints: no swelling   Integument: No lesions. No erythema. No open wounds.  Labs:     COVID-19 Lab Results    COVID-19  Lab Results   Component Value Date    COVID19 Not Detected 2025    COVID19 Not Detected 2025       Pro-Calcitonin  Recent Labs   Lab 25  0415   PCT 1.31*       Cardiac  Recent Labs   Lab 25  0512   PBNP 5,617*       Creatinine Kinase  No results for input(s): \"CK\" in the last 168 hours.    Inflammatory Markers  No results for input(s): \"CRP\", \"ARIK\", \"LDH\", \"DDIMER\" in the last 168 hours.    Recent Labs   Lab 25  0542 25  0546 02/10/25  0454   RBC 3.36* 3.37* 3.14*   HGB 10.3* 10.4* 9.5*   HCT 29.8* 30.7* 27.8*   MCV 88.7 91.1 88.5   MCH 30.7 30.9 30.3   MCHC 34.6 33.9 34.2   RDW 14.6 14.8 14.7   NEPRELIM 13.08* 10.07* 12.08*   WBC 18.1* 15.5* 16.9*   .0 298.0 342.0   NEUT 73 80  --    LYMPH 8 13  --    MON 9 4  --    EOS 0 1  --    NRBC 1*  --   --          Recent Labs   Lab 25  0115 25  1436 25  0518  02/09/25  0546 02/10/25  0454   *   < > 155* 117* 158*   BUN 19   < > 22 13 13   CREATSERUM 0.71   < > 0.78 0.65* 0.63*   CA 8.9   < > 8.3* 8.6* 8.6*   ALB 3.4  --  3.2  --   --    *   < > 134* 134* 133*   K 3.4*   < > 3.4*  3.4* 3.9  3.9 3.8   CL 99   < > 96* 98 99   CO2 25.0   < > 29.0 25.0 27.0   ALKPHO 37*  --  47  --   --    AST 49*  --  65*  --   --    ALT 25  --  11  --   --    BILT 0.7  --  1.4*  --   --    TP 5.6*  --  5.6*  --   --     < > = values in this interval not displayed.       No results found for: \"VANCT\"  Microbiology    Hospital Encounter on 01/31/25   1. Blood Culture     Status: None (Preliminary result)    Collection Time: 02/07/25  7:12 PM    Specimen: Blood,peripheral   Result Value Ref Range    Blood Culture Result No Growth 2 Days N/A   2. Urine Culture, Routine     Status: Abnormal    Collection Time: 01/31/25  1:39 AM    Specimen: Urine, clean catch   Result Value Ref Range    Urine Culture >100,000 CFU/ML Escherichia coli (A) N/A       Susceptibility    Escherichia coli -  (no method available)     Ampicillin 4 Sensitive      Cefazolin <=4 Sensitive      Ciprofloxacin <=0.25 Sensitive      Gentamicin <=1 Sensitive      Meropenem <=0.25 Sensitive      Levofloxacin <=0.12 Sensitive      Nitrofurantoin <=16 Sensitive      Piperacillin + Tazobactam <=4 Sensitive      Trimethoprim/Sulfa <=20 Sensitive        Problem list reviewed:  Patient Active Problem List   Diagnosis    Trochanteric bursitis of left hip    Disorientation    Sepsis due to undetermined organism (HCC)    Sepsis with hypotension (HCC)    Hyponatremia    Thrombocytopenia    Metabolic acidosis    Pyelonephritis    Pneumonia of left upper lobe due to infectious organism       Evaluation process:    Evaluation involves complex antimicrobial therapy, counseling, and treatment such as decisions to institute, withdrawal, or adjust antibiotic therapy, and or educational discussions with patient, family, and  collaborating treatment team          ASSESSMENT/PLAN:  1. Influenza infeciton  Presented with new fever in hospital on 2/7, PCR flu  Complete 5 days of tamiflu    2. E coli UTI and bacteremia  -reported slow stream in the past  Currently voiding with external cath  Has been voiding without signifciant residual  CT no stone or hydro    Can switch to PO cephalexin    2. Cardiomyopathy, CHF  Possible component of ALI  Dieuresis per hospialist      3. Gallstones    4. CAD SP CABG/Afib          Ahsan Norton MD, MD [facility]  [phone]

## 2025-02-10 NOTE — CM/SW NOTE
Care Progression Note:  Length of stay: 10  GMLOS:   Avoidable Delays:   Code Status: Full Code    Acute Medical Issue/Factors:   Hypoxic respiratory failure due to PNA, pulmonary edema, Influenza. Pt receiving IV Zosyn and tamiflu. On 2L/NC. Weaning o2 as able.   Afib with RVR- Cardiology consulted 2/8. Pt with history [employer] DC PPM. Med management per service. HR controlled.  E.coli bacteremia/UTI- ID following. Pt on IV Zosyn. Following repeat blood cultures from 2/7.     Discharge Barriers:   Expected discharge date: TBD  Expected next site of care: [facility].     Pt's insurance authorization for [facility] ends today 2/10. Will need to re-submit for insurance authorization and pt will need an isolation room at facility d/t his Influenza. Updates provided to [facility] via [facility].    / available for discharge planning.     ZACH SuttonN, CMSRN    [id number]

## 2025-02-10 NOTE — PLAN OF CARE
A&Ox4. Droplet isolation for influenza. Patient on 1liters NC , O2 sat low 90's. Continuous pulse ox maintained. Afib on telemetry. Pt denies chest pain or shortness of breath. Patient incontinent of bowel and bladder. Primo fit in place. Pain managed with prn pain medication. Skin tears on bilateral arms , Mepilex changed. Scattered bruising. Sacrum red with mepilex in place Patient in bed with fall precautions in place. Discussed plan of care with patient. Patient verbalizes understanding.     0145   pt. Restless , SOB , feels warmth , pt. symptomatic with AFib HR of 115's. Keep pt. comfortable , increase O2 to 2 li. Cont. Monitor per tele/labs/v/s.  Meds as ordered.    Problem: CARDIOVASCULAR - ADULT  Goal: Maintains optimal cardiac output and hemodynamic stability  Description: INTERVENTIONS:  - Monitor vital signs, rhythm, and trends  - Monitor for bleeding, hypotension and signs of decreased cardiac output  - Evaluate effectiveness of vasoactive medications to optimize hemodynamic stability  - Monitor arterial and/or venous puncture sites for bleeding and/or hematoma  - Assess quality of pulses, skin color and temperature  - Assess for signs of decreased coronary artery perfusion - ex. Angina  - Evaluate fluid balance, assess for edema, trend weights  Outcome: Progressing     Problem: RESPIRATORY - ADULT  Goal: Achieves optimal ventilation and oxygenation  Description: INTERVENTIONS:  - Assess for changes in respiratory status  - Assess for changes in mentation and behavior  - Position to facilitate oxygenation and minimize respiratory effort  - Oxygen supplementation based on oxygen saturation or ABGs  - Provide Smoking Cessation handout, if applicable  - Encourage broncho-pulmonary hygiene including cough, deep breathe, Incentive Spirometry  - Assess the need for suctioning and perform as needed  - Assess and instruct to report SOB or any respiratory difficulty  - Respiratory Therapy support as indicated  -  Manage/alleviate anxiety  - Monitor for signs/symptoms of CO2 retention  Outcome: Progressing     Problem: RISK FOR INFECTION - ADULT  Goal: Absence of fever/infection during anticipated neutropenic period  Description: INTERVENTIONS  - Monitor WBC  - Administer growth factors as ordered  - Implement neutropenic guidelines  Outcome: Progressing

## 2025-02-10 NOTE — PROGRESS NOTES
INFECTIOUS DISEASE  PROGRESS NOTE            [facility]    Jose Alfredo Solis Patient Status:  Inpatient    1940 MRN [id number]   [facility] 4SW-A Attending Henna Edge MD   Hosp Day # 10 PCP MIKAYLA BAUGH, MD ROSEANN     Antimicrobials: #10  Zosyn D#3  Tamiflu D#3    Subjective: Patient seen and examined today. Continues to have a cough, denies any sputum production. Denies any palpitations. On 2L NC. Afebrile.     Objective:  Temp:  [97.9 °F (36.6 °C)-98.7 °F (37.1 °C)] 98 °F (36.7 °C)  Pulse:  [] 71  Resp:  [18-21] 21  BP: (130-146)/(52-86) 139/52  SpO2:  [90 %-97 %] 94 %    General: awake alert, on NC  Vital signs:Temp:  [97.9 °F (36.6 °C)-98.7 °F (37.1 °C)] 98 °F (36.7 °C)  Pulse:  [] 71  Resp:  [18-21] 21  BP: (130-146)/(52-86) 139/52  SpO2:  [90 %-97 %] 94 %  HEENT: Moist mucous membranes. Extraocular muscles are intact.  Neck: supple, no masses  Respiratory: Crackles bilaterally.  Abdomen: Soft, nontender, nondistended.   Musculoskeletal: Movement of extremities; joints: no swelling   Integument: Multiple bandages in place with skin tears noted (pictures reviewed in epic)    Labs:     COVID-19 Lab Results    COVID-19  Lab Results   Component Value Date    COVID19 Not Detected 2025    COVID19 Not Detected 2025       Pro-Calcitonin  Recent Labs   Lab 25  0415   PCT 1.31*       Cardiac  Recent Labs   Lab 25  0512   PBNP 5,617*       Creatinine Kinase  No results for input(s): \"CK\" in the last 168 hours.    Inflammatory Markers  No results for input(s): \"CRP\", \"ARIK\", \"LDH\", \"DDIMER\" in the last 168 hours.    Recent Labs   Lab 25  0542 25  0546 02/10/25  0454   RBC 3.36* 3.37* 3.14*   HGB 10.3* 10.4* 9.5*   HCT 29.8* 30.7* 27.8*   MCV 88.7 91.1 88.5   MCH 30.7 30.9 30.3   MCHC 34.6 33.9 34.2   RDW 14.6 14.8 14.7   NEPRELIM 13.08* 10.07* 12.08*   WBC 18.1* 15.5* 16.9*   .0 298.0 342.0   NEUT 73 80  --    LYMPH 8 13  --     MON 9 4  --    EOS 0 1  --    NRBC 1*  --   --          Recent Labs   Lab 02/04/25  0115 02/04/25  1436 02/08/25  0542 02/09/25  0546 02/10/25  0454   *   < > 155* 117* 158*   BUN 19   < > 22 13 13   CREATSERUM 0.71   < > 0.78 0.65* 0.63*   CA 8.9   < > 8.3* 8.6* 8.6*   ALB 3.4  --  3.2  --   --    *   < > 134* 134* 133*   K 3.4*   < > 3.4*  3.4* 3.9  3.9 3.8   CL 99   < > 96* 98 99   CO2 25.0   < > 29.0 25.0 27.0   ALKPHO 37*  --  47  --   --    AST 49*  --  65*  --   --    ALT 25  --  11  --   --    BILT 0.7  --  1.4*  --   --    TP 5.6*  --  5.6*  --   --     < > = values in this interval not displayed.       No results found for: \"VANCT\"  Microbiology    Hospital Encounter on 01/31/25   1. Blood Culture     Status: None (Preliminary result)    Collection Time: 02/07/25  7:12 PM    Specimen: Blood,peripheral   Result Value Ref Range    Blood Culture Result No Growth 2 Days N/A   2. Urine Culture, Routine     Status: Abnormal    Collection Time: 01/31/25  1:39 AM    Specimen: Urine, clean catch   Result Value Ref Range    Urine Culture >100,000 CFU/ML Escherichia coli (A) N/A       Susceptibility    Escherichia coli -  (no method available)     Ampicillin 4 Sensitive      Cefazolin <=4 Sensitive      Ciprofloxacin <=0.25 Sensitive      Gentamicin <=1 Sensitive      Meropenem <=0.25 Sensitive      Levofloxacin <=0.12 Sensitive      Nitrofurantoin <=16 Sensitive      Piperacillin + Tazobactam <=4 Sensitive      Trimethoprim/Sulfa <=20 Sensitive        Problem list reviewed:  Patient Active Problem List   Diagnosis    Trochanteric bursitis of left hip    Disorientation    Sepsis due to undetermined organism (HCC)    Sepsis with hypotension (HCC)    Hyponatremia    Thrombocytopenia    Metabolic acidosis    Pyelonephritis    Pneumonia of left upper lobe due to infectious organism       Imaging: Reviewed.       ASSESSMENT/PLAN:  1. Influenza A with possible superimposed bacterial pneumonia    2. E. coli  UTI and bacteremia   -Bcx 2/2 + 1/31, repeat bcxs 2/1 negative  Reported slow stream in the past  Currently voiding with external cath  Has been voiding without signifciant residual  CT no stone or hydro    3. Acute respiratory failure with hypoxia, d/t #1 in addition to pulmonary edema.     4. Leukocytosis  -WBC elevated earlier this admission d/t #2, resolved. Now again with leukocytosis, assume d/t #1    5. Cardiomyopathy, CHF  Possible component of ALI  Diuresis per hospialist    6. Gallstones    7. CAD SP CABG/Afib    PLAN:  -continue IV Zosyn for UTI/bacteremia and possible superimposed bacterial pna  -continue Tamiflu for influenza  -follow respiratory status, wean O2 as able--> 02 requirements stable today; pulm following  -follow WBC and temps  -follow new blood cultures from 2/7- ngtd  -check sputum culture if able    Discussed case with RN and patient.     Angelic Stevens PA-C

## 2025-02-11 LAB
BASOPHILS # BLD: 0 X10(3) UL (ref 0–0.2)
BASOPHILS NFR BLD: 0 %
EOSINOPHIL # BLD: 0.17 X10(3) UL (ref 0–0.7)
EOSINOPHIL NFR BLD: 1 %
ERYTHROCYTE [DISTWIDTH] IN BLOOD BY AUTOMATED COUNT: 14.6 %
HCT VFR BLD AUTO: 29.5 %
HGB BLD-MCNC: 10.2 G/DL
LYMPHOCYTES NFR BLD: 14 %
LYMPHOCYTES NFR BLD: 2.38 X10(3) UL (ref 1–4)
MCH RBC QN AUTO: 30.7 PG (ref 26–34)
MCHC RBC AUTO-ENTMCNC: 34.6 G/DL (ref 31–37)
MCV RBC AUTO: 88.9 FL
MONOCYTES # BLD: 2.55 X10(3) UL (ref 0.1–1)
MONOCYTES NFR BLD: 15 %
MORPHOLOGY: NORMAL
NEUTROPHILS # BLD AUTO: 11.2 X10 (3) UL (ref 1.5–7.7)
NEUTROPHILS NFR BLD: 68 %
NEUTS BAND NFR BLD: 2 %
NEUTS HYPERSEG # BLD: 11.9 X10(3) UL (ref 1.5–7.7)
PLATELET # BLD AUTO: 403 10(3)UL (ref 150–450)
PLATELET MORPHOLOGY: NORMAL
RBC # BLD AUTO: 3.32 X10(6)UL
TOTAL CELLS COUNTED BLD: 100
WBC # BLD AUTO: 17 X10(3) UL (ref 4–11)

## 2025-02-11 PROCEDURE — 99233 SBSQ HOSP IP/OBS HIGH 50: CPT | Performed by: INTERNAL MEDICINE

## 2025-02-11 PROCEDURE — 99232 SBSQ HOSP IP/OBS MODERATE 35: CPT | Performed by: HOSPITALIST

## 2025-02-11 RX ORDER — GUAIFENESIN 600 MG/1
600 TABLET, EXTENDED RELEASE ORAL 2 TIMES DAILY
Status: DISCONTINUED | OUTPATIENT
Start: 2025-02-11 | End: 2025-02-14

## 2025-02-11 RX ORDER — IPRATROPIUM BROMIDE AND ALBUTEROL SULFATE 2.5; .5 MG/3ML; MG/3ML
3 SOLUTION RESPIRATORY (INHALATION)
Status: DISCONTINUED | OUTPATIENT
Start: 2025-02-11 | End: 2025-02-14

## 2025-02-11 RX ORDER — HYDROCODONE BITARTRATE AND HOMATROPINE METHYLBROMIDE ORAL SOLUTION 5; 1.5 MG/5ML; MG/5ML
5 LIQUID ORAL EVERY 4 HOURS PRN
Status: DISCONTINUED | OUTPATIENT
Start: 2025-02-11 | End: 2025-02-14

## 2025-02-11 RX ORDER — FUROSEMIDE 10 MG/ML
40 INJECTION INTRAMUSCULAR; INTRAVENOUS ONCE
Status: COMPLETED | OUTPATIENT
Start: 2025-02-11 | End: 2025-02-11

## 2025-02-11 NOTE — PHYSICAL THERAPY NOTE
PHYSICAL THERAPY TREATMENT NOTE - INPATIENT    Room Number: 8611/8611-A      Session: 3         Presenting Problem: fall, weakness, confusion  Co-Morbidities : CVA, CAD, CABG, R TAN 2024, AF, hypothyroidism    PHYSICAL THERAPY ASSESSMENT   Patient demonstrates excellent progress this session, goals  remain in progress.      Patient is requiring contact guard assist as a result of the following impairments: decreased functional strength, decreased endurance/aerobic capacity, impaired standing  balance, impaired coordination, impaired motor planning, decreased muscular endurance, and increased O2 needs from baseline.     Patient continues to function below baseline with transfers, gait, and stair negotiation.  Next session anticipate patient to progress gait.  Physical Therapy will continue to follow patient for duration of hospitalization.    Patient continues to benefit from continued skilled PT services: to promote return to prior level of function and safety with continuous assistance and gradual rehabilitative therapy .    PLAN DURING HOSPITALIZATION  Nursing Mobility Recommendation : 1 Assist  PT Device Recommendation: Rolling walker  PT Treatment Plan: Bed mobility;Patient education;Gait training;Range of motion;Strengthening;Stair training;Transfer training  Frequency (Obs): 3-5x/week     CURRENT GOALS       Goal #1 Patient is able to demonstrate supine - sit EOB @ level: modified independent      Goal #2 Patient is able to demonstrate transfers Sit to/from Stand at assistance level:modified independent      Goal #3 Patient is able to ambulate 300 feet with assist device: walker - rolling at assistance level: modified independent      Goal #4 Patient to be modified independent to ascend/descend 11 stairs step-to pattern with 2 railings   Goal #5     Goal #6     Goal Comments: Goals established on 2/1/2025 2/11/2025 all goals ongoing     SUBJECTIVE  \"Yeah I'm so congested\"    OBJECTIVE  Precautions: Bed/chair  alarm    WEIGHT BEARING RESTRICTION     PAIN ASSESSMENT   Ratin          BALANCE                                                                                                                       Static Sitting: Fair  Dynamic Sitting: Fair           Static Standing: Poor +  Dynamic Standing: Poor    ACTIVITY TOLERANCE                         O2 WALK       AM-PAC '6-Clicks' INPATIENT SHORT FORM - BASIC MOBILITY  How much difficulty does the patient currently have...  Patient Difficulty: Turning over in bed (including adjusting bedclothes, sheets and blankets)?: A Little   Patient Difficulty: Sitting down on and standing up from a chair with arms (e.g., wheelchair, bedside commode, etc.): A Little   Patient Difficulty: Moving from lying on back to sitting on the side of the bed?: A Little   How much help from another person does the patient currently need...   Help from Another: Moving to and from a bed to a chair (including a wheelchair)?: A Little   Help from Another: Need to walk in hospital room?: A Little   Help from Another: Climbing 3-5 steps with a railing?: A Little     AM-PAC Score:  Raw Score: 18   Approx Degree of Impairment: 46.58%   Standardized Score (AM-PAC Scale): 43.63   CMS Modifier (G-Code): CK    FUNCTIONAL ABILITY STATUS  Gait Assessment   Functional Mobility/Gait Assessment  Gait Assistance: Contact guard assist  Distance (ft): 20  Assistive Device: Rolling walker  Pattern: Shuffle;R Steppage    Skilled Therapy Provided  Pt presents seated in BS chair   Pt reported feeling very congested - O2 tubing only in one nostril upon arrival     Bed Mobility:  Rolling:    Supine<>Sit:    Sit<>Supine:      Transfer Mobility:  Sit<>Stand: CGA    Stand<>Sit: CGA    Gait: CGA with RW    Therapist's Comments: noted slight desat with coughing spasms - cues for pursed lip breathing.  Pt with poor cardiopulmonary endurance, but not requiring physical assist at this point      THERAPEUTIC EXERCISES  Lower  Extremity Alternating marching  Knee extension  LAQ  STS     Upper Extremity      Position Sitting     Repetitions      Sets        Patient End of Session: Up in chair;Needs met;Call light within reach;RN aware of session/findings;All patient questions and concerns addressed;Hospital anti-slip socks    PT Session Time: 15 minutes  Gait Training: 15 minutes  Therapeutic Activity: 0 minutes  Therapeutic Exercise:  minutes   Neuromuscular Re-education:  minutes

## 2025-02-11 NOTE — CONSULTS
[facility]  Report of Inpatient Wound Care Consultation    Jose Alfredo Solis Patient Status:  Inpatient    1940 MRN [id number]   Location [facility] 8NE-A Attending Jovanni Villarreal MD   Hosp Day # 11 PCP MIKAYLA BAUGH, MD ROSEANN     Reason for Consultation:  skin tears on arms recommendations     History of Present Illness:  Jose Alfredo Solis is a a(n) 85 year old male. Patient's spouse present in the room. Patient and spouse state wounds were result of a fall prior to being admitted.  Patient with multiple comorbidities, with skin breakdown described below.     Subjective: \"The one to my left arm is worse\"    History:  Past Medical History:    High cholesterol    Hypertension    Hypothyroid    Stroke (HCC)     Past Surgical History:   Procedure Laterality Date    Cabg      Other surgical history      prostate surgery      reports that he quit smoking about 56 years ago. He has never used smokeless tobacco. He reports current alcohol use. He reports that he does not use drugs.      Allergies:  @ALLERGY    Laboratory Data:    Recent Labs   Lab 25  0542 25  0546 02/10/25  0454 25  0721   WBC 18.1* 15.5* 16.9* 17.0*   HGB 10.3* 10.4* 9.5* 10.2*   HCT 29.8* 30.7* 27.8* 29.5*   .0 298.0 342.0 403.0   CREATSERUM 0.78 0.65* 0.63*  --    BUN 22 13 13  --    * 117* 158*  --    CA 8.3* 8.6* 8.6*  --    ALB 3.2  --   --   --    TP 5.6*  --   --   --          ASSESSMENT:  Wound 25 Arm Distal;Left;Posterior;Upper (Active)   Date First Assessed/Time First Assessed: 25 0500   Location: Arm  Wound Location Orientation: Distal;Left;Posterior;Upper      Assessments 2025 10:29 AM   Wound Image     Site Assessment Yellow   Closure None   Drainage Amount Moderate   Drainage Description Serosanguineous   Treatments Cleansed;Honey Gel   Dressing Aquacel;ABD Pad;Conform   Dressing Changed Changed   Dressing Status Clean;Dry;Intact   Wound Length (cm) 2 cm   Wound Width  (cm) 4.5 cm   Wound Surface Area (cm^2) 9 cm^2   Wound Depth (cm) 0.1 cm   Wound Volume (cm^3) 0.9 cm^3   Margins Well-defined edges   Non-staged Wound Description Full thickness   Nhung-wound Assessment Ecchymosis;Dry   Wound Bed Fibrin (%) 100 %   Wound Odor None       Wound 02/02/25 Arm Anterior;Lower;Proximal;Right (Active)   Date First Assessed/Time First Assessed: 02/02/25 2000   Primary Wound Type: Skin Tear  Location: Arm  Wound Location Orientation: Anterior;Lower;Proximal;Right      Assessments 2/11/2025 10:31 AM   Wound Image     Site Assessment Red   Closure None   Drainage Amount Small   Drainage Description Serosanguineous   Treatments Cleansed   Dressing Vaseline gauze;Gauze;Conform   Dressing Changed Changed   Dressing Status Clean;Dry;Intact   Wound Length (cm) 0.3 cm   Wound Width (cm) 1 cm   Wound Surface Area (cm^2) 0.3 cm^2   Wound Depth (cm) 0.1 cm   Wound Volume (cm^3) 0.03 cm^3   Margins Well-defined edges   Non-staged Wound Description Partial thickness   Nhung-wound Assessment Dry;Ecchymosis   Wound Odor None   Shape 100% non granular red tissue      Wound Cleaning and Dressings:  Left upper arm, posterior  Wound cleansing:  normal saline  Wound cleaning frequency: Daily  Wound product: Honey gel, Alginate, ABD pad, and Conforming roll  Dressing change frequency:  Change dressing daily and/or PRN    Wound Cleaning and Dressings:  Right arm  Wound cleansing:  normal saline  Wound cleaning frequency: Daily  Wound product: vaseline gauze, gauze and conforming roll  Dressing change frequency:  Change dressing daily and/or PRN    ALL WOUND CARE SUPPLIES CAN BE OBTAINED FROM [facility]     If patient is Diabetic: want to make sure blood sugars are within a controled range for wound healing     Protein intake: depending on providers recommendations and patients kidney functions - if kidneys are good then recommend patient to increase protein intake (Boost, Troy, Ensure, Premiere Protein)      Recommendations: Patient's spouse states he will be discharged to [facility], where wound care may be continued. If patient is discharged home, may follow up with home health and/or outpatient wound care clinic.     Thank you for this consultation and for allowing me to participate in the care of your patient.  Please call [phone] if you have any questions about this consultation and plan of care.     Time Spent 30 Minutes.    Thank you,  JOEY Reagan  Wound/Ostomy/Continence nurse    2/11/2025  11:02 AM

## 2025-02-11 NOTE — CM/SW NOTE
Prior Authorization - Destination  Destination Type: Skilled nursing facility  Service Provider: Chel  Payer Communication Destination Comments: Eben [id number]  Prior Authorization Status: Submitted/Pending      Danya Magallon DSC

## 2025-02-11 NOTE — WOUND PROGRESS NOTE
[facility]  Inpatient Wound Care Contact Note    Jose Alfredo Solis Patient Status:  Inpatient    1940 MRN [id number]   Location [facility] 8NE-A Attending Jovanni Villarreal MD   Hosp Day # 11 PCP MIKAYLA BAUGH, MD ROSEANN     Attempted to see patient for follow up wound care session.  Patient is currently unavailable,patient is eating his breakfast .    We will continue to follow this patient while in-house and assist with wound care discharge planning.    Please [place] me at [phone] if you have any questions about this consultation and plan of care.      Thank you,  Nancy Tavera BSN RN [facility]  [address] [address]

## 2025-02-11 NOTE — PROGRESS NOTES
Progress Note  Jose Alfredo Solis Patient Status:  Inpatient    1940 MRN [id number]   Location [facility] 8NE-A Attending Jovanni Villarreal MD   Hosp Day # 11 PCP MIKAYLA BAUGH, MD ROSEANN     Subjective:  BP and rhythm stable.  3 L NC.      Objective:  /53 (BP Location: Right arm)   Pulse 71   Temp 97.9 °F (36.6 °C) (Oral)   Resp 20   Ht 172.7 cm (5' 8\")   Wt 161 lb 6 oz (73.2 kg)   SpO2 98%   BMI 24.54 kg/m²     Telemetry: a paced, v sensed 70bpm; PAF last night    Intake/Output:    Intake/Output Summary (Last 24 hours) at 2025 0831  Last data filed at 2025 0824  Gross per 24 hour   Intake 2830 ml   Output 900 ml   Net 1930 ml       Last 3 Weights   25 0403 161 lb 6 oz (73.2 kg)   02/10/25 0456 163 lb 12.8 oz (74.3 kg)   25 0430 160 lb 11.5 oz (72.9 kg)   25 0447 161 lb 6 oz (73.2 kg)   25 0553 157 lb 3 oz (71.3 kg)   25 0600 164 lb 7.4 oz (74.6 kg)   25 0200 164 lb 0.4 oz (74.4 kg)   25 0004 167 lb 5.3 oz (75.9 kg)   25 0000 159 lb 9.8 oz (72.4 kg)   25 0450 158 lb 4.6 oz (71.8 kg)   25 0146 167 lb 15.9 oz (76.2 kg)   18 0956 160 lb (72.6 kg)       Labs:  Recent Labs   Lab 25  0542 25  0546 02/10/25  0454   * 117* 158*   BUN 22 13 13   CREATSERUM 0.78 0.65* 0.63*   EGFRCR 86 91 92   CA 8.3* 8.6* 8.6*   * 134* 133*   K 3.4*  3.4* 3.9  3.9 3.8   CL 96* 98 99   CO2 29.0 25.0 27.0     Recent Labs   Lab 25  0546 02/10/25  0454 25  0721   RBC 3.37* 3.14* 3.32*   HGB 10.4* 9.5* 10.2*   HCT 30.7* 27.8* 29.5*   MCV 91.1 88.5 88.9   MCH 30.9 30.3 30.7   MCHC 33.9 34.2 34.6   RDW 14.8 14.7 14.6   NEPRELIM 10.07* 12.08* 11.20*   WBC 15.5* 16.9* 17.0*   .0 342.0 403.0         No results for input(s): \"TROP\", \"TROPHS\", \"CK\" in the last 168 hours.    Diagnostics:  No results found.   Review of Systems   Constitutional: Positive for malaise/fatigue.   Cardiovascular:  Positive for  palpitations. Negative for chest pain, dyspnea on exertion and leg swelling.   Respiratory:  Positive for cough and shortness of breath.        Physical Exam:    Gen: alert, oriented x 3, NAD  Heent: pupils equal, reactive. Mucous membranes moist.   Neck: no jvd  Cardiac: regular rate and rhythm, normal S1,S2, no murmur, clicks, rub or gallop  Lungs: bilateral crackles  Abd: soft, NT/ND +bs  Ext: no edema  Skin: Warm, dry  Neuro: No focal deficits      Medications:     cephalexin  500 mg Oral QID    metoprolol succinate ER  50 mg Oral 2x Daily(Beta Blocker)    oseltamivir  75 mg Oral BID    aspirin  81 mg Oral Daily    atorvastatin  20 mg Oral Nightly    apixaban  5 mg Oral BID    amiodarone  200 mg Oral Daily    pantoprazole  40 mg Oral QAM AC    tamsulosin  0.4 mg Oral Daily      dilTIAZem Stopped (02/08/25 0509)    sodium chloride 100 mL/hr at 02/08/25 0222     Assessment:  Acute Hypoxic Respiratory Failure - Influenza/Possible Superimposed PNA  On 3 L O2  On tamiflu, antibx  Pulm following  Sepsis d/t UTI, E Coli Bacteremia  IV antibx  Repeat BC NGTD  ID following  Paroxysmal Atrial Fibrillation, Hx [employer] DC PPM  Initially w/RVR on IV diltiazem; now rate controlled - currently a paced on tele. On eliquis.  Echo w/LVEF 55-60%, no RWMA, normal diastolic function, mod increased LA volume, mild AR, mild MR, PASP 34mmHg, IVC normal  On amiodarone 200mg po daily, Toprol 50mg po BID  Follows w/[facility] as OP  Hx CAD s/p CABG  On asa, bb, statin  WALTER - now improved  Cr 1.50 on admit, normalized  ACEI on hold   Hypertension  On toprol; enalapril on hold  Hyperlipidemia - on atorvastatin 20mg     Plan:  CXR 2/20 with diffuse interstitial infiltrates.  Will give 40 mg IVP lasix and assess response.  BMP in am.    Gerson  L3

## 2025-02-11 NOTE — PLAN OF CARE
Receive patient at 1930. Patient is A&O x 4. Up with x1 assist and walker. Diminished  lung sound and dyspnea on exertion, on 3 L O2, per nasal canula. A paced on tele monitor. No pain reported. Generalized bruising noted. Bed in low position and call light within reach. Reviewed plan of care and patient verbalizes understanding    Midnight- Patient up in chair, stating he feels comfortable, declined sleeping on his bed. Call light and personal belongings within reach.     Problem: Patient/Family Goals  Goal: Patient/Family Long Term Goal  Description: Patient's Long Term Goal: dc to [facility]    Interventions:  - pt/ot  - pain control  - off oxygen  - See additional Care Plan goals for specific interventions  Outcome: Progressing  Goal: Patient/Family Short Term Goal  Description: Patient's Short Term Goal: pain control    Interventions:   - pharm and nonpharm interventions  - See additional Care Plan goals for specific interventions  Outcome: Progressing     Problem: CARDIOVASCULAR - ADULT  Goal: Maintains optimal cardiac output and hemodynamic stability  Description: INTERVENTIONS:  - Monitor vital signs, rhythm, and trends  - Monitor for bleeding, hypotension and signs of decreased cardiac output  - Evaluate effectiveness of vasoactive medications to optimize hemodynamic stability  - Monitor arterial and/or venous puncture sites for bleeding and/or hematoma  - Assess quality of pulses, skin color and temperature  - Assess for signs of decreased coronary artery perfusion - ex. Angina  - Evaluate fluid balance, assess for edema, trend weights  Outcome: Progressing     Problem: RESPIRATORY - ADULT  Goal: Achieves optimal ventilation and oxygenation  Description: INTERVENTIONS:  - Assess for changes in respiratory status  - Assess for changes in mentation and behavior  - Position to facilitate oxygenation and minimize respiratory effort  - Oxygen supplementation based on oxygen saturation or ABGs  - Provide Smoking  Cessation handout, if applicable  - Encourage broncho-pulmonary hygiene including cough, deep breathe, Incentive Spirometry  - Assess the need for suctioning and perform as needed  - Assess and instruct to report SOB or any respiratory difficulty  - Respiratory Therapy support as indicated  - Manage/alleviate anxiety  - Monitor for signs/symptoms of CO2 retention  Outcome: Progressing     Problem: RISK FOR INFECTION - ADULT  Goal: Absence of fever/infection during anticipated neutropenic period  Description: INTERVENTIONS  - Monitor WBC  - Administer growth factors as ordered  - Implement neutropenic guidelines  Outcome: Progressing

## 2025-02-11 NOTE — CM/SW NOTE
SW read hospitalist note, anticipate pt might be ready for discharge within the next day or so. PRUDENCIO requested DSC to submit for insurance auth and put anticipated discharge on Wednesday/Thursday. Await insurance auth approval.     Betty GARZA, ASHERW  Discharge Planner

## 2025-02-11 NOTE — PROGRESS NOTES
[facility] Progress Note     SUBJECTIVE/Interval history:  No acute events overnight, he feels the same today, continued cough/congestion and dyspnea. No pain. No fevers  Remains on RA    Review of Systems:   A comprehensive 14 point review of systems was completed.   Pertinent positives and negatives noted in the HPI.    Medications  Reviewed personally   cephalexin  500 mg Oral QID    metoprolol succinate ER  50 mg Oral 2x Daily(Beta Blocker)    oseltamivir  75 mg Oral BID    aspirin  81 mg Oral Daily    atorvastatin  20 mg Oral Nightly    apixaban  5 mg Oral BID    amiodarone  200 mg Oral Daily    pantoprazole  40 mg Oral QAM AC    tamsulosin  0.4 mg Oral Daily       guaiFENesin-codeine    dilTIAZem    metoclopramide    acetaminophen    melatonin    polyethylene glycol (PEG 3350)    sennosides    bisacodyl    ondansetron    benzonatate    glycerin-hypromellose-    sodium chloride    OBJECTIVE:  Vitals:    02/10/25 1927 02/10/25 2249 02/11/25 0350 02/11/25 0403   BP:  102/54 136/65 136/65   BP Location:  Right arm Right arm    Pulse: 71 72 71 71   Resp:  24 18    Temp:  98.5 °F (36.9 °C) 98.1 °F (36.7 °C)    TempSrc:  Oral Oral    SpO2: 99% 97% 97% 94%   Weight:    161 lb 6 oz (73.2 kg)   Height:           Vital signs in last 24 hours:  Blood pressure 136/65, pulse 71, temperature 98.1 °F (36.7 °C), temperature source Oral, resp. rate 18, height 5' 8\" (1.727 m), weight 161 lb 6 oz (73.2 kg), SpO2 94%.     Intake/Output:  I/O last 3 completed shifts:  In: 2470 [P.O.:1570; IV PIGGYBACK:900]  Out: 1050 [Urine:1050]       Physical Exam:  General: Appears alert, oriented x3. No acute distress  Neurologic:No focal deficits noted.  Alert.  Oriented.  Lungs:rhonchi on right today, crackles stable on left.   Heart:RRR no m  Abdomen: soft, nondistended, no rigidity, no reaction with palpation. No hernias noted  Extremities:No overt deformities, edema    Lab Data Review:   Recent Labs    Lab 02/08/25  0542 02/09/25 0546 02/10/25  0454   * 117* 158*   BUN 22 13 13   CREATSERUM 0.78 0.65* 0.63*   CA 8.3* 8.6* 8.6*   * 134* 133*   K 3.4*  3.4* 3.9  3.9 3.8   CL 96* 98 99   CO2 29.0 25.0 27.0     Recent Labs   Lab 02/09/25 0546 02/10/25  0454 02/11/25  0721   RBC 3.37* 3.14* 3.32*   HGB 10.4* 9.5* 10.2*   HCT 30.7* 27.8* 29.5*   MCV 91.1 88.5 88.9   MCH 30.9 30.3 30.7   MCHC 33.9 34.2 34.6   RDW 14.8 14.7 14.6   NEPRELIM 10.07* 12.08* 11.20*   WBC 15.5* 16.9* 17.0*   .0 342.0 403.0     No results for input(s): \"BNP\" in the last 168 hours.  No results for input(s): \"TROP\", \"CK\" in the last 168 hours.  No results for input(s): \"PT\", \"INR\", \"PTT\" in the last 168 hours.  No results for input(s): \"ABGPHT\", \"OMQYKY5C\", \"TDLSJ5B\", \"ABGHCO3\", \"ABGBE\", \"TEMP\", \"SIVAKUMAR\", \"SITE\", \"DEV\", \"THGB\" in the last 168 hours.    Invalid input(s): \"IEQ40TRV\", \"CHOB\"    Other Labs:  Interval Culture Data:   Hospital Encounter on 01/31/25   1. Blood Culture     Status: None (Preliminary result)    Collection Time: 02/07/25  7:12 PM    Specimen: Blood,peripheral   Result Value Ref Range    Blood Culture Result No Growth 3 Days N/A   2. Urine Culture, Routine     Status: Abnormal    Collection Time: 01/31/25  1:39 AM    Specimen: Urine, clean catch   Result Value Ref Range    Urine Culture >100,000 CFU/ML Escherichia coli (A) N/A       Susceptibility    Escherichia coli -  (no method available)     Ampicillin 4 Sensitive      Cefazolin <=4 Sensitive      Ciprofloxacin <=0.25 Sensitive      Gentamicin <=1 Sensitive      Meropenem <=0.25 Sensitive      Levofloxacin <=0.12 Sensitive      Nitrofurantoin <=16 Sensitive      Piperacillin + Tazobactam <=4 Sensitive      Trimethoprim/Sulfa <=20 Sensitive      No results for input(s): \"COLORUR\", \"CLARITY\", \"SPECGRAVITY\", \"GLUUR\", \"BILUR\", \"KETUR\", \"BLOODURINE\", \"PHURINE\", \"PROUR\", \"UROBILINOGEN\", \"NITRITE\", \"LEUUR\", \"WBCUR\", \"RBCUR\", \"BACUR\", \"EPIUR\" in the last  168 hours.    Interval Radiology:   Reviewed personally  XR CHEST AP PORTABLE  (CPT=71045)    Result Date: 2/10/2025  CONCLUSION:  Postoperative changes of cardiothoracic surgery with stable cardiac and mediastinal contours.  Stable positioning of left chest wall ICD.  Moderate, diffuse interstitial opacities noted throughout the lungs with patchy alveolar component of the left mid/upper lung, slightly progressed in the interim.  Findings may reflect pulmonary edema or infectious/inflammatory process.  No associated pleural effusion or pneumothorax.   LOCATION:  [id number]      Dictated by (CST): Lamar Mendes MD on 2/10/2025 at 7:33 AM     Finalized by (CST): Lamar Mendes MD on 2/10/2025 at 7:34 AM       XR CHEST AP PORTABLE  (CPT=71045)    Result Date: 2/9/2025  CONCLUSION:  1. No significant improvement in extensive bilateral left worse than right infiltrates.  Findings are again concerning for pneumonia.  Superimposed pulmonary edema is not excluded. 2. Stable small left pleural effusion.    LOCATION:  [facility]      Dictated by (CST): Brendon Shannon MD on 2/09/2025 at 5:24 AM     Finalized by (CST): Brendon Shannon MD on 2/09/2025 at 5:26 AM       XR CHEST AP PORTABLE  (CPT=71045)    Result Date: 2/8/2025  CONCLUSION:  Worsening airspace consolidation in the left upper and mid lung field and slight worsening patchy airspace infiltrate in the right perihilar lung.  Findings are concerning for pneumonia.  Superimposed pulmonary edema is not excluded.  The infiltrates should be followed to resolution to exclude any underlying lung lesion.   LOCATION:  [facility]      Dictated by (CST): Brendon Shannon MD on 2/08/2025 at 7:36 AM     Finalized by (CST): Brendon Shannon MD on 2/08/2025 at 7:38 AM       XR CHEST AP PORTABLE  (CPT=71045)    Result Date: 2/5/2025  CONCLUSION:  Postoperative changes of cardiothoracic surgery with stable cardiac and mediastinal contours.  Stable positioning of left chest wall ICD.  There  continues to be a perihilar distribution of interstitial opacities with increasing patchy alveolar component of the left upper lobe.  Pulmonary edema is favored, though an infectious/inflammatory process could have a similar appearance.  No associated pleural effusion or pneumothorax.   LOCATION:  [facility]      Dictated by (CST): Lamar Mendes MD on 2/05/2025 at 3:06 PM     Finalized by (CST): Lamar Mendes MD on 2/05/2025 at 3:08 PM       XR FOOT, COMPLETE (MIN 3 VIEWS), LEFT (CPT=73630)    Result Date: 2/4/2025  CONCLUSION:  Degenerative and arthritic changes are seen in the ankle and foot.  There is no focal bony destructive lesion seen.  No soft tissue gas identified.   LOCATION:  [facility]   Dictated by (CST): Brendon Shannon MD on 2/04/2025 at 1:28 PM     Finalized by (CST): Brendon Shannon MD on 2/04/2025 at 1:31 PM       XR VIDEO SWALLOW (CPT=74230)    Result Date: 2/3/2025  PROCEDURE:  XR VIDEO SWALLOW (CPT=74230)  TECHNIQUE:  Video fluoroscopic swallowing study was performed in cooperation with the speech pathologist.  Barium of varying consistencies was administered orally with patient in lateral projection.  COMPARISON:  None.  INDICATIONS:  c/f aspiration, CXR with ggo  PATIENT STATED HISTORY: (As transcribed by Technologist)  Patient offered no additional history at this time.   CINE CAPTURES:  5 FLUORSCOPY TIME:  55 seconds RADIATION DOSE (AIR KERMA PRODUCT):  203.7 uGy/m2   FINDINGS:  PHARYNGEAL PHASE:  Normal for age. ASPIRATION:  None. PENETRATION:  Transient penetration with thin barium by cup. OTHER:  Mild residual vallecular barium with the thicker consistencies.  PLEASE SEE SPEECH PATHOLOGIST REPORT FOR FULL ASSESSMENT OF SWALLOWING MECHANISM.   LOCATION:  [facility]    Dictated by (CST): Lamar Mendes MD on 2/03/2025 at 2:06 PM     Finalized by (CST): Lamar Mendes MD on 2/03/2025 at 2:06 PM       CT CHEST (CONTRAST ONLY) ABDOMEN (W+WO) PELVIS (W+) (CPT=71260/06519)    Result Date: 2/2/2025  CONCLUSION:    1. No evidence of pulmonary embolus.  2. Interlobular septal thickening along with pleural effusions is suggestive of fluid overload.  3. Ground-glass consolidations in the left lung are noted.  This may be due to infectious process.  However, this could also be due to pulmonary edema.  4. Cholelithiasis is incidentally noted.  An acute inflammatory process in the abdomen and pelvis is not identified.     LOCATION:  [place] [id number]   Dictated by (CST): Donato Maldonado MD on 2/02/2025 at 1:59 PM     Finalized by (CST): Donato Maldonado MD on 2/02/2025 at 2:06 PM       XR CHEST AP PORTABLE  (CPT=71045)    Result Date: 2/2/2025  CONCLUSION:  Findings are suggestive of increased fluid overload.   LOCATION:  [place] [id number]      Dictated by (CST): Donato Maldonado MD on 2/02/2025 at 12:11 PM     Finalized by (CST): Donato Maldonado MD on 2/02/2025 at 12:11 PM       XR CHEST AP PORTABLE  (CPT=71045)    Result Date: 1/31/2025  CONCLUSION:  No focal consolidation.  Please see details as above.   ED M.D. notified of these findings with preliminary radiology report from [facility] services.   LOCATION:  [facility]      Dictated by (CST): Saida Andrade MD on 1/31/2025 at 8:29 AM     Finalized by (CST): Saida Andrade MD on 1/31/2025 at 8:30 AM       XR CHEST AP PORTABLE  (CPT=71045)    Result Date: 1/31/2025  CONCLUSION:  Interval placement right internal jugular central venous catheter which is appropriately position.  There is no evidence of complication.   LOCATION:  [facility]      Dictated by (CST): Jordan Tilley MD on 1/31/2025 at 7:15 AM     Finalized by (CST): Jordan Tilley MD on 1/31/2025 at 7:16 AM       CT STROKE CTA BRAIN/CTA NECK (W IV)(CPT=70496/56212)    Result Date: 1/31/2025  CONCLUSION:  1. Encephalomalacia right frontal and parietal lobe and multifocal small vessel ischemic change are noted consistent with chronic ischemia. 2. Intracranial CT angiogram demonstrates no large vessel occlusion or aneurysm. 3. CT angiogram of neck  demonstrates no significant stenosis or dissection.   Preliminary report was reviewed and there is no significant discrepancy.    LOCATION:  [facility]   Dictated by (CST): Jordan Tilley MD on 1/31/2025 at 6:06 AM     Finalized by (CST): Jordan Tilley MD on 1/31/2025 at 6:10 AM       CT STROKE BRAIN (NO IV)(CPT=70450)    Result Date: 1/31/2025  CONCLUSION:  1. There are chronic ischemic changes with multifocal encephalomalacia right frontal and parietal lobe and periventricular small vessel ischemic change. 2. There is atrophy. 3. There is no specific evidence of an acute abnormality.  Preliminary report was reviewed and there is no significant discrepancy.    LOCATION:  [facility]   Dictated by (CST): Jordan Tilley MD on 1/31/2025 at 6:03 AM     Finalized by (CST): Jordan Tilley MD on 1/31/2025 at 6:06 AM        ASSESSMENT  Acute hypoxic respiratory failure due to pneumonia, pulmonary edema  Pneumonia, possible aspiration vs nosocomial  Influenza infection  Sepsis due to E coli bacteremia, UTI  Afib with RVR  Leukocytosis related to above  Shock related to sepsis, now resolved  CAD/CABG, HTN, HLD  Hx CVA    PLAN  Continue close monitoring of respiratory status, wean o2 for sats >89%  O2 walk prior to discharge  Continue abx -now on keflex per ID  Complete tamiflu course day 4 of 5  Add nebulized bronchodilators given persistent cough/congestion  Mobilize as able, use IS  Cardiology following/managing afib  Discharge planning    Osmany Bang MD

## 2025-02-11 NOTE — PLAN OF CARE
Received pt at approx 0730.  Pt is A&Ox4, no pain, complaints of cough- PRN robitussin provided. Weaned 02 from 3L to 1L, rhonchi present, non-productive cough. A paced, no chest pain. Mixed continence of bladder, primofit in place, IV lasix provided, last BM per pt 2-10. Pt updated with plan of care.     POC  - wound care to assess pt   - IV lasix  - wean 02 as tolerated   - nebs   - tamiflu & keflex       Problem: Patient/Family Goals  Goal: Patient/Family Long Term Goal  Description: Patient's Long Term Goal: dc to [facility]  Stay out of  hospital 2-11    Interventions:  - pt/ot  - pain control  - off oxygen  - med compliance, follow ups    - See additional Care Plan goals for specific interventions  Outcome: Progressing  Goal: Patient/Family Short Term Goal  Description: Patient's Short Term Goal: pain control  Walk in hallway 2-11    Interventions:   - pharm and nonpharm interventions  - walk with PT/RN/PCT, sit up in chair    - See additional Care Plan goals for specific interventions  Outcome: Progressing     Problem: CARDIOVASCULAR - ADULT  Goal: Maintains optimal cardiac output and hemodynamic stability  Description: INTERVENTIONS:  - Monitor vital signs, rhythm, and trends  - Monitor for bleeding, hypotension and signs of decreased cardiac output  - Evaluate effectiveness of vasoactive medications to optimize hemodynamic stability  - Monitor arterial and/or venous puncture sites for bleeding and/or hematoma  - Assess quality of pulses, skin color and temperature  - Assess for signs of decreased coronary artery perfusion - ex. Angina  - Evaluate fluid balance, assess for edema, trend weights  Outcome: Progressing     Problem: RESPIRATORY - ADULT  Goal: Achieves optimal ventilation and oxygenation  Description: INTERVENTIONS:  - Assess for changes in respiratory status  - Assess for changes in mentation and behavior  - Position to facilitate oxygenation and minimize respiratory effort  - Oxygen supplementation  based on oxygen saturation or ABGs  - Provide Smoking Cessation handout, if applicable  - Encourage broncho-pulmonary hygiene including cough, deep breathe, Incentive Spirometry  - Assess the need for suctioning and perform as needed  - Assess and instruct to report SOB or any respiratory difficulty  - Respiratory Therapy support as indicated  - Manage/alleviate anxiety  - Monitor for signs/symptoms of CO2 retention  Outcome: Progressing     Problem: RISK FOR INFECTION - ADULT  Goal: Absence of fever/infection during anticipated neutropenic period  Description: INTERVENTIONS  - Monitor WBC  - Administer growth factors as ordered  - Implement neutropenic guidelines  Outcome: Progressing

## 2025-02-11 NOTE — OCCUPATIONAL THERAPY NOTE
OCCUPATIONAL THERAPY TREATMENT NOTE - INPATIENT     Room Number: 8611/8611-A  Session: 3   Number of Visits to Meet Established Goals: 4    Presenting Problem: fall, disorientation;  septic shock to UTI    ASSESSMENT   Patient demonstrates good  progress this session, goals remain in progress.    Patient continues to function near baseline with toileting, upper body dressing, lower body dressing, grooming, bed mobility, transfers, static sitting balance, dynamic sitting balance, static standing balance, dynamic standing balance, maintaining seated position, and functional standing tolerance.   Contributing factors to remaining limitations include decreased functional strength, decreased functional reach, decreased endurance, impaired coordination, impaired motor planning, decreased muscular endurance, and medical status.  Next session anticipate patient to progress toileting, upper body dressing, lower body dressing, grooming, bed mobility, transfers, static sitting balance, dynamic sitting balance, static standing balance, dynamic standing balance, maintaining seated position, and functional standing tolerance.  Occupational Therapy will continue to follow patient for duration of hospitalization.    Patient continues to benefit from continued skilled OT services: to promote return to prior level of function and safety with continuous assistance and gradual rehabilitative therapy.     History: Patient is a 85 year old male admitted on 1/31/2025 with Presenting Problem: fall, disorientation; septic shock to UTI. Co-Morbidities : CVA, CAD, CABG, R TAN 2024, AF, hypothyroidism     WEIGHT BEARING RESTRICTION       Recommendations for nursing staff:   Transfers: CGA  Toileting location: toilet    TREATMENT SESSION:  Patient Start of Session: sitting up in chair    FUNCTIONAL TRANSFER ASSESSMENT  Sit to Stand: Chair  Edge of Bed: Not Tested  Chair: Contact Guard Assist  Toilet Transfer: Not Tested (declined need)    BED  MOBILITY  Sit to Supine (OT): Not Tested  Scooting: CGA in chair    BALANCE ASSESSMENT  Static Sitting: Contact Guard Assist  Static Standing: Contact Guard Assist (to stand and amb in room but with rest breaks)    FUNCTIONAL ADL ASSESSMENT  Eating: Not Tested  Grooming Seated: Supervision (to wipe face and wipe nose)  UB Dressing Seated: Supervision (for adjusting robe)  LB Dressing Seated: Supervision (for socks in figure 4)  Toileting Seated: Not Tested  Toileting Standing: Not Tested    ACTIVITY TOLERANCE: vitals stable, slight dip in O2 to 88%                         O2 SATURATIONS       EDUCATION PROVIDED  Patient Education : Role of Occupational Therapy; Functional Transfer Techniques; Fall Prevention; Posture/Positioning; Energy Conservation  Patient's Response to Education: Verbalized Understanding; Requires Further Education    Equipment used: RW  Demonstrates functional use, Would benefit from additional trial      Exercises:    Exercises Repetitions Comments   Scapular elevation     Scapular retraction     Shoulder rolls     Shoulder flexion     Shoulder abduction     Shoulder internal/external rotation     Forward punch     Elbow flexion     Elbow extension     Forearm pronation/supination     Wrist flexion/extension     Gross grasp/fist pumps     Ankle pumps     Knee extension     Marching       Therapist comments: Pt reported fatigue at home, pt educated on work simplification and energy conservation for at home to assist with independence with fatigue at home.   Patient End of Session: Up in chair;Needs met;Call light within reach;RN aware of session/findings;All patient questions and concerns addressed;Hospital anti-slip socks;Alarm set    SUBJECTIVE  Pt stated, \"I am just so congested.\"    PAIN ASSESSMENT  Ratin  Location: no pain at this time        OBJECTIVE  Precautions: Bed/chair alarm    AM-PAC ‘6-Clicks’ Inpatient Daily Activity Short Form  -   Putting on and taking off regular lower body  clothing?: A Little  -   Bathing (including washing, rinsing, drying)?: A Little  -   Toileting, which includes using toilet, bedpan or urinal? : A Little  -   Putting on and taking off regular upper body clothing?: A Little  -   Taking care of personal grooming such as brushing teeth?: A Little  -   Eating meals?: A Little    AM-PAC Score:  Score: 18  Approx Degree of Impairment: 46.65%  Standardized Score (AM-PAC Scale): 38.66    PLAN     OT Treatment Plan: Endurance training;UE strengthening/ROM;Functional transfer training;ADL training;Energy conservation/work simplification techniques;Balance activities;Patient/Family training;Patient/Family education;Equipment eval/education;Compensatory technique education  Rehab Potential : Good  Frequency: 3-5x/week    OT Goals:   ADL Goals   Patient will perform grooming: with supervision and while standing at sink  Patient will perform lower body dressing:  with setup, with supervision, and with adaptive equipment PRN  Patient will perform toileting: with supervision     Functional Transfer Goals  Patient will transfer from sit to supine:  with supervision --> MET 2/6  Patient will transfer from supine to sit:  with supervision  Patient will transfer to toilet:  with supervision     UE Exercise Program Goal  Patient will be supervision with bilateral AROM HEP (home exercise program).    OT Session Time: 15 minutes  Self-Care Home Management: 10 minutes  Therapeutic Activity: 5 minutes  Neuromuscular Re-education: 00 minutes  Therapeutic Exercise: 0 minutes  Cognitive Skills: 0 minutes  Sensory Integrative: 0 minutes  Orthotic Management and Trainin minutes  Can add/delete any of these

## 2025-02-11 NOTE — PROGRESS NOTES
INFECTIOUS DISEASE  PROGRESS NOTE            [facility]    Jose Alfredo Solis Patient Status:  Inpatient    1940 MRN [id number]   [facility] 4SW-A Attending Henna Edge MD   Hosp Day # 11 PCP MIKAYLA BAUGH, MD ROSEANN     Antimicrobials: #11  Keflex D#1  Tamiflu D#4    Subjective: Patient seen and examined today. Feeling better. Less cough. No sputum production. Down to 1 L NC. Afebrile.     Objective:  Temp:  [97.6 °F (36.4 °C)-98.5 °F (36.9 °C)] 97.6 °F (36.4 °C)  Pulse:  [71-76] 76  Resp:  [18-24] 20  BP: (102-144)/(53-71) 113/71  SpO2:  [93 %-100 %] 100 %    General: awake alert, on NC, + coughing during exam  Vital signs:Temp:  [97.6 °F (36.4 °C)-98.5 °F (36.9 °C)] 97.6 °F (36.4 °C)  Pulse:  [71-76] 76  Resp:  [18-24] 20  BP: (102-144)/(53-71) 113/71  SpO2:  [93 %-100 %] 100 %  HEENT: Moist mucous membranes. Extraocular muscles are intact.  Neck: supple, no masses  Respiratory: Less crackles bilaterally.  Abdomen: Soft, nontender, nondistended.   Musculoskeletal: Movement of extremities; joints: no swelling   Integument: Multiple bandages in place with skin tears noted (pictures reviewed in epic)    Labs:     COVID-19 Lab Results    COVID-19  Lab Results   Component Value Date    COVID19 Not Detected 2025    COVID19 Not Detected 2025       Pro-Calcitonin  Recent Labs   Lab 25  0415   PCT 1.31*       Cardiac  Recent Labs   Lab 25  0512   PBNP 5,617*       Creatinine Kinase  No results for input(s): \"CK\" in the last 168 hours.    Inflammatory Markers  No results for input(s): \"CRP\", \"ARIK\", \"LDH\", \"DDIMER\" in the last 168 hours.    Recent Labs   Lab 25  0542 25  0546 25  0721   RBC 3.36*   < > 3.32*   HGB 10.3*   < > 10.2*   HCT 29.8*   < > 29.5*   MCV 88.7   < > 88.9   MCH 30.7   < > 30.7   MCHC 34.6   < > 34.6   RDW 14.6   < > 14.6   NEPRELIM 13.08*   < > 11.20*   WBC 18.1*   < > 17.0*   .0   < > 403.0   NEUT 73   < > 68   LYMPH  8   < > 14   MON 9   < > 15   EOS 0   < > 1   NRBC 1*  --   --     < > = values in this interval not displayed.         Recent Labs   Lab 02/08/25  0542 02/09/25  0546 02/10/25  0454   * 117* 158*   BUN 22 13 13   CREATSERUM 0.78 0.65* 0.63*   CA 8.3* 8.6* 8.6*   ALB 3.2  --   --    * 134* 133*   K 3.4*  3.4* 3.9  3.9 3.8   CL 96* 98 99   CO2 29.0 25.0 27.0   ALKPHO 47  --   --    AST 65*  --   --    ALT 11  --   --    BILT 1.4*  --   --    TP 5.6*  --   --        No results found for: \"VANCT\"  Microbiology    Hospital Encounter on 01/31/25   1. Blood Culture     Status: None (Preliminary result)    Collection Time: 02/07/25  7:12 PM    Specimen: Blood,peripheral   Result Value Ref Range    Blood Culture Result No Growth 3 Days N/A   2. Urine Culture, Routine     Status: Abnormal    Collection Time: 01/31/25  1:39 AM    Specimen: Urine, clean catch   Result Value Ref Range    Urine Culture >100,000 CFU/ML Escherichia coli (A) N/A       Susceptibility    Escherichia coli -  (no method available)     Ampicillin 4 Sensitive      Cefazolin <=4 Sensitive      Ciprofloxacin <=0.25 Sensitive      Gentamicin <=1 Sensitive      Meropenem <=0.25 Sensitive      Levofloxacin <=0.12 Sensitive      Nitrofurantoin <=16 Sensitive      Piperacillin + Tazobactam <=4 Sensitive      Trimethoprim/Sulfa <=20 Sensitive        Problem list reviewed:  Patient Active Problem List   Diagnosis    Trochanteric bursitis of left hip    Disorientation    Sepsis due to undetermined organism (HCC)    Sepsis with hypotension (HCC)    Hyponatremia    Thrombocytopenia    Metabolic acidosis    Pyelonephritis    Pneumonia of left upper lobe due to infectious organism       Imaging: Reviewed.       ASSESSMENT/PLAN:  Influenza A with possible superimposed bacterial pneumonia  E. coli UTI and bacteremia. Bcx 2/2 + 1/31, repeat bcxs 2/1 negative. CT no stone or hydro.  Acute respiratory failure with hypoxia, d/t #1 in addition to pulmonary  edema. Improving.   Leukocytosis. WBC elevated earlier this admission d/t #2, resolved. Now again with leukocytosis, assume d/t #1  Cardiomyopathy, CHF, CAD SP CABG/Afib  Gallstones    PLAN:  -continue po keflex for E. Coli UTI/bacteremia  -continue Tamiflu for influenza  -follow respiratory status, wean O2 as able--> 02 requirements improving; pulm following  -follow WBC and temps  -follow new blood cultures from 2/7- ngtd  -check sputum culture if able    Discussed case with patient.     Angelic Stevens PA-C

## 2025-02-11 NOTE — PROGRESS NOTES
[facility]   part of [facility]     Hospitalist Progress Note     Jose Alfredo Solis Patient Status:  Inpatient    1940 MRN [id number]   Location [facility] 4SW-A Attending Henna Edge MD   Hosp Day # 11 PCP MIKAYLA BAUGH, MD ROSEANN     Chief Complaint: Weakness     Subjective:     Still on 2L. Coughing nonstop    Objective:    Review of Systems:   A comprehensive review of systems was completed; pertinent positive and negatives stated in subjective.    Vital signs:  Temp:  [97.6 °F (36.4 °C)-98.5 °F (36.9 °C)] 97.6 °F (36.4 °C)  Pulse:  [71-76] 76  Resp:  [18-24] 20  BP: (102-144)/(53-71) 113/71  SpO2:  [93 %-100 %] 100 %    Physical Exam:    General: No acute distress  Respiratory: No wheezes, no rhonchi, congested  Cardiovascular: S1, S2, regular rate and rhythm  Abdomen: Soft, Non-tender, non-distended, positive bowel sounds  Neuro: No new focal deficits.   Extremities: No edema      Diagnostic Data:    Labs:  Recent Labs   Lab 25  0415 25  0542 25  0546 02/10/25  0454 25  0721   WBC 8.9 18.1* 15.5* 16.9* 17.0*   HGB 11.2* 10.3* 10.4* 9.5* 10.2*   MCV 89.4 88.7 91.1 88.5 88.9   .0 222.0 298.0 342.0 403.0   BAND 2 10 1  --  2       Recent Labs   Lab 25  0542 25  0546 02/10/25  0454   * 117* 158*   BUN 22 13 13   CREATSERUM 0.78 0.65* 0.63*   CA 8.3* 8.6* 8.6*   ALB 3.2  --   --    * 134* 133*   K 3.4*  3.4* 3.9  3.9 3.8   CL 96* 98 99   CO2 29.0 25.0 27.0   ALKPHO 47  --   --    AST 65*  --   --    ALT 11  --   --    BILT 1.4*  --   --    TP 5.6*  --   --        Estimated Glomerular Filtration Rate: 92.1 mL/min/1.73m2 (A) (by CKD-EPI based on SCr of 0.63 mg/dL (L)).    No results for input(s): \"TROP\", \"TROPHS\", \"CK\" in the last 168 hours.      No results for input(s): \"PTP\", \"INR\" in the last 168 hours.                 Microbiology    Hospital Encounter on 25   1. Blood Culture     Status: None (Preliminary result)     Collection Time: 02/07/25  7:12 PM    Specimen: Blood,peripheral   Result Value Ref Range    Blood Culture Result No Growth 3 Days N/A   2. Urine Culture, Routine     Status: Abnormal    Collection Time: 01/31/25  1:39 AM    Specimen: Urine, clean catch   Result Value Ref Range    Urine Culture >100,000 CFU/ML Escherichia coli (A) N/A       Susceptibility    Escherichia coli -  (no method available)     Ampicillin 4 Sensitive      Cefazolin <=4 Sensitive      Ciprofloxacin <=0.25 Sensitive      Gentamicin <=1 Sensitive      Meropenem <=0.25 Sensitive      Levofloxacin <=0.12 Sensitive      Nitrofurantoin <=16 Sensitive      Piperacillin + Tazobactam <=4 Sensitive      Trimethoprim/Sulfa <=20 Sensitive          Imaging: Reviewed in Epic.    Medications:    ipratropium-albuterol  3 mL Nebulization QID    guaiFENesin ER  600 mg Oral BID    cephalexin  500 mg Oral QID    metoprolol succinate ER  50 mg Oral 2x Daily(Beta Blocker)    oseltamivir  75 mg Oral BID    aspirin  81 mg Oral Daily    atorvastatin  20 mg Oral Nightly    apixaban  5 mg Oral BID    amiodarone  200 mg Oral Daily    pantoprazole  40 mg Oral QAM AC    tamsulosin  0.4 mg Oral Daily       Assessment & Plan:      #Septic shock due to Ecoli UTI and bacteremia- shock resolved, off pressors  Repeat blood Cx 2/1 NGTD   IV Abx: Ancef   ID on Cs-IV Abx, dc on PO     #recurrent sepsis 2/7  #2/2 suspect gram negative PNA and flu   #acute pulm edema as well  #acute respiratory failure w/ hypoxia  #influenza A  Gave IVF back when septic. No sepsis bolus due to recent FOL  BNP elevated  Cont to try to wean O2. Still on 2L. May need to DC w/ home O2. O2 walk eval  ECHO unremarkable  Deescalated back down to ancef  bcxs ngtd  Consulted pulm   tamiflu   Increase cough meds  Lasix IV today     #Afib, permanent w/w RVR  BB  DOAC, Amio  In hindsight, 2/2 sepsis. HR much improved now that improved  Cards consulted. Dc cardizem gtt  Briefly in afib again, now NSR    #h/o  CAD s/p CABG  #Hypothyroid  #Hypertension  #HLD    Dispo: possibly home in next 1-2d. Possibly w/ home o2    Jovanni Villarreal MD    Supplementary Documentation:     Quality:  DVT Mechanical Prophylaxis:   SCDs, Early ambuation  DVT Pharmacologic Prophylaxis   Medication    apixaban (Eliquis) tab 5 mg         DVT Pharmacologic prophylaxis: Aspirin 81 mg      Code Status: Full Code  Garnica: External urinary catheter in place  Garnica Duration (in days):   Central line:    CARMINA: 2/14/2025    Discharge is dependent on: clinical   At this point Mr. Solis is expected to be discharge to: tbd     The 21st Century Cures Act makes medical notes like these available to patients in the interest of transparency. Please be advised this is a medical document. Medical documents are intended to carry relevant information, facts as evident, and the clinical opinion of the practitioner. The medical note is intended as peer to peer communication and may appear blunt or direct. It is written in medical language and may contain abbreviations or verbiage that are unfamiliar.              **Certification      PHYSICIAN Certification of Need for Inpatient Hospitalization - Initial Certification    Patient will require inpatient services that will reasonably be expected to span two midnight's based on the clinical documentation in H+P.   Based on patients current state of illness, I anticipate that, after discharge, patient will require TBD.

## 2025-02-12 PROBLEM — J96.01 ACUTE HYPOXIC RESPIRATORY FAILURE (HCC): Status: ACTIVE | Noted: 2025-02-12

## 2025-02-12 LAB
ANION GAP SERPL CALC-SCNC: 9 MMOL/L (ref 0–18)
BUN BLD-MCNC: 15 MG/DL (ref 9–23)
CALCIUM BLD-MCNC: 9.4 MG/DL (ref 8.7–10.6)
CHLORIDE SERPL-SCNC: 99 MMOL/L (ref 98–112)
CO2 SERPL-SCNC: 27 MMOL/L (ref 21–32)
CREAT BLD-MCNC: 0.63 MG/DL
EGFRCR SERPLBLD CKD-EPI 2021: 92 ML/MIN/1.73M2 (ref 60–?)
GLUCOSE BLD-MCNC: 125 MG/DL (ref 70–99)
OSMOLALITY SERPL CALC.SUM OF ELEC: 282 MOSM/KG (ref 275–295)
POTASSIUM SERPL-SCNC: 3.9 MMOL/L (ref 3.5–5.1)
SODIUM SERPL-SCNC: 135 MMOL/L (ref 136–145)

## 2025-02-12 PROCEDURE — 99232 SBSQ HOSP IP/OBS MODERATE 35: CPT | Performed by: INTERNAL MEDICINE

## 2025-02-12 PROCEDURE — 99233 SBSQ HOSP IP/OBS HIGH 50: CPT | Performed by: INTERNAL MEDICINE

## 2025-02-12 RX ORDER — BUDESONIDE 0.5 MG/2ML
0.5 INHALANT ORAL
Status: DISCONTINUED | OUTPATIENT
Start: 2025-02-12 | End: 2025-02-14

## 2025-02-12 RX ORDER — FUROSEMIDE 20 MG/1
20 TABLET ORAL DAILY
Status: DISCONTINUED | OUTPATIENT
Start: 2025-02-12 | End: 2025-02-14

## 2025-02-12 RX ORDER — SODIUM CHLORIDE FOR INHALATION 3 %
3 VIAL, NEBULIZER (ML) INHALATION
Status: DISCONTINUED | OUTPATIENT
Start: 2025-02-12 | End: 2025-02-14

## 2025-02-12 NOTE — CM/SW NOTE
Prior Authorization - Destination  Destination Type: Skilled nursing facility  Service Provider: Chel  Payer Communication Destination Comments: [facility] [id number]  Prior Authorization Status: Approved  Prior Authorization Start Date: 02/12/25  2.12 to 2/14 ([facility] 2/15)    Danya Magallon DSC

## 2025-02-12 NOTE — PROGRESS NOTES
INFECTIOUS DISEASE  PROGRESS NOTE            [facility]    Jose Alfredo Solis Patient Status:  Inpatient    1940 MRN [id number]   [facility] 4SW-A Attending Henna Edge MD   Hosp Day # 12 PCP MIKAYLA BAUGH, MD ROSEANN     Antimicrobials: #12  Keflex D#2  S/p Tamiflu x 5 days    Subjective: Patient seen and examined today. Feeling better, less cough. No sputum production. On room air. Afebrile.     Objective:  Temp:  [97.9 °F (36.6 °C)-98.3 °F (36.8 °C)] 98 °F (36.7 °C)  Pulse:  [] 100  Resp:  [16-22] 20  BP: (106-144)/(50-71) 106/71  SpO2:  [90 %-98 %] 92 %    General: awake alert, on room air. Sitting up in a chair.   Vital signs:Temp:  [97.9 °F (36.6 °C)-98.3 °F (36.8 °C)] 98 °F (36.7 °C)  Pulse:  [] 100  Resp:  [16-22] 20  BP: (106-144)/(50-71) 106/71  SpO2:  [90 %-98 %] 92 %  HEENT: Moist mucous membranes. Extraocular muscles are intact.  Neck: supple, no masses  Respiratory: Bilateral crackles  Abdomen: Soft, nontender, nondistended.   Musculoskeletal: Movement of extremities; joints: no swelling   Integument: Multiple bandages in place with skin tears noted    Labs:     COVID-19 Lab Results    COVID-19  Lab Results   Component Value Date    COVID19 Not Detected 2025    COVID19 Not Detected 2025       Pro-Calcitonin  Recent Labs   Lab 25  0415   PCT 1.31*       Cardiac  No results for input(s): \"TROP\", \"PBNP\" in the last 168 hours.      Creatinine Kinase  No results for input(s): \"CK\" in the last 168 hours.    Inflammatory Markers  No results for input(s): \"CRP\", \"ARIK\", \"LDH\", \"DDIMER\" in the last 168 hours.    Recent Labs   Lab 25  0542 25  0546 25  0721   RBC 3.36*   < > 3.32*   HGB 10.3*   < > 10.2*   HCT 29.8*   < > 29.5*   MCV 88.7   < > 88.9   MCH 30.7   < > 30.7   MCHC 34.6   < > 34.6   RDW 14.6   < > 14.6   NEPRELIM 13.08*   < > 11.20*   WBC 18.1*   < > 17.0*   .0   < > 403.0   NEUT 73   < > 68   LYMPH 8   < > 14    MON 9   < > 15   EOS 0   < > 1   NRBC 1*  --   --     < > = values in this interval not displayed.         Recent Labs   Lab 02/08/25  0542 02/09/25  0546 02/10/25  0454 02/12/25  0549   * 117* 158* 125*   BUN 22 13 13 15   CREATSERUM 0.78 0.65* 0.63* 0.63*   CA 8.3* 8.6* 8.6* 9.4   ALB 3.2  --   --   --    * 134* 133* 135*   K 3.4*  3.4* 3.9  3.9 3.8 3.9   CL 96* 98 99 99   CO2 29.0 25.0 27.0 27.0   ALKPHO 47  --   --   --    AST 65*  --   --   --    ALT 11  --   --   --    BILT 1.4*  --   --   --    TP 5.6*  --   --   --        No results found for: \"VANCT\"  Microbiology    Hospital Encounter on 01/31/25   1. Blood Culture     Status: None (Preliminary result)    Collection Time: 02/07/25  7:12 PM    Specimen: Blood,peripheral   Result Value Ref Range    Blood Culture Result No Growth 4 Days N/A   2. Urine Culture, Routine     Status: Abnormal    Collection Time: 01/31/25  1:39 AM    Specimen: Urine, clean catch   Result Value Ref Range    Urine Culture >100,000 CFU/ML Escherichia coli (A) N/A       Susceptibility    Escherichia coli -  (no method available)     Ampicillin 4 Sensitive      Cefazolin <=4 Sensitive      Ciprofloxacin <=0.25 Sensitive      Gentamicin <=1 Sensitive      Meropenem <=0.25 Sensitive      Levofloxacin <=0.12 Sensitive      Nitrofurantoin <=16 Sensitive      Piperacillin + Tazobactam <=4 Sensitive      Trimethoprim/Sulfa <=20 Sensitive        Problem list reviewed:  Patient Active Problem List   Diagnosis    Trochanteric bursitis of left hip    Disorientation    Sepsis due to undetermined organism (HCC)    Sepsis with hypotension (HCC)    Hyponatremia    Thrombocytopenia    Metabolic acidosis    Pyelonephritis    Pneumonia of left upper lobe due to infectious organism    Acute hypoxic respiratory failure (HCC)       Imaging: Reviewed.       ASSESSMENT/PLAN:  Influenza A with possible superimposed bacterial pneumonia. Completed course of Tamiflu.  E. coli UTI and  bacteremia. Bcx 2/2 + 1/31, repeat bcxs 2/1 negative. CT no stone or hydro.  Acute respiratory failure with hypoxia, d/t #1 in addition to pulmonary edema. Resolved.  Leukocytosis. WBC elevated earlier this admission d/t #2, resolved. Now again with leukocytosis, assume d/t #1  Cardiomyopathy, CHF, CAD SP CABG/Afib  Gallstones    PLAN:  -continue po keflex for E. Coli UTI/bacteremia  -follow respiratory status, pulm following  -follow WBC and temps  -follow new blood cultures from 2/7- ngtd  -check sputum culture if able    Discussed case with patient.     Angelic Stevens PA-C

## 2025-02-12 NOTE — PROGRESS NOTES
[place] [facility] Progress Note     SUBJECTIVE/Interval history:  No acute events overnight, still with congestion, had some difficultly with phlegm earlier. Thinks dyspnea is better. No pain  Remains on RA    Review of Systems:   A comprehensive 14 point review of systems was completed.   Pertinent positives and negatives noted in the HPI.    Medications  Reviewed personally   ipratropium-albuterol  3 mL Nebulization QID    guaiFENesin ER  600 mg Oral BID    cephalexin  500 mg Oral QID    metoprolol succinate ER  50 mg Oral 2x Daily(Beta Blocker)    aspirin  81 mg Oral Daily    atorvastatin  20 mg Oral Nightly    apixaban  5 mg Oral BID    amiodarone  200 mg Oral Daily    pantoprazole  40 mg Oral QAM AC    tamsulosin  0.4 mg Oral Daily       HYDROcodone-homatropine    guaiFENesin-codeine    dilTIAZem    metoclopramide    acetaminophen    melatonin    polyethylene glycol (PEG 3350)    sennosides    bisacodyl    ondansetron    benzonatate    glycerin-hypromellose-    sodium chloride    OBJECTIVE:  Vitals:    02/11/25 2328 02/12/25 0312 02/12/25 0619 02/12/25 0726   BP: 144/56 137/57 128/58    BP Location: Right arm Right arm Right arm    Pulse: 71 76 72    Resp: 16 22     Temp: 98.2 °F (36.8 °C)      TempSrc: Oral      SpO2: 95% 93% 92% 93%   Weight:  157 lb 13.6 oz (71.6 kg)     Height:           Vital signs in last 24 hours:  Blood pressure 128/58, pulse 72, temperature 98.2 °F (36.8 °C), temperature source Oral, resp. rate 22, height 5' 8\" (1.727 m), weight 157 lb 13.6 oz (71.6 kg), SpO2 93%.     Intake/Output:  I/O last 3 completed shifts:  In: 1330 [P.O.:1330]  Out: 1500 [Urine:1500]       Physical Exam:  General: Appears alert, oriented x3. No acute distress  Neurologic:No focal deficits noted.  Alert.  Oriented.  Lungs:rhonchi bilaterally today. No wheeze  Heart:RRR no m  Abdomen: soft, nondistended, no rigidity, no reaction with palpation. No hernias noted  Extremities:No overt  deformities, edema    Lab Data Review:   Recent Labs   Lab 02/09/25  0546 02/10/25  0454 02/12/25  0549   * 158* 125*   BUN 13 13 15   CREATSERUM 0.65* 0.63* 0.63*   CA 8.6* 8.6* 9.4   * 133* 135*   K 3.9  3.9 3.8 3.9   CL 98 99 99   CO2 25.0 27.0 27.0     Recent Labs   Lab 02/09/25  0546 02/10/25  0454 02/11/25  0721   RBC 3.37* 3.14* 3.32*   HGB 10.4* 9.5* 10.2*   HCT 30.7* 27.8* 29.5*   MCV 91.1 88.5 88.9   MCH 30.9 30.3 30.7   MCHC 33.9 34.2 34.6   RDW 14.8 14.7 14.6   NEPRELIM 10.07* 12.08* 11.20*   WBC 15.5* 16.9* 17.0*   .0 342.0 403.0     No results for input(s): \"BNP\" in the last 168 hours.  No results for input(s): \"TROP\", \"CK\" in the last 168 hours.  No results for input(s): \"PT\", \"INR\", \"PTT\" in the last 168 hours.  No results for input(s): \"ABGPHT\", \"ZVUJDD1C\", \"RHCHK0Z\", \"ABGHCO3\", \"ABGBE\", \"TEMP\", \"SIVAKUMAR\", \"SITE\", \"DEV\", \"THGB\" in the last 168 hours.    Invalid input(s): \"TLX46XBK\", \"CHOB\"    Other Labs:  Interval Culture Data:   Hospital Encounter on 01/31/25   1. Blood Culture     Status: None (Preliminary result)    Collection Time: 02/07/25  7:12 PM    Specimen: Blood,peripheral   Result Value Ref Range    Blood Culture Result No Growth 4 Days N/A   2. Urine Culture, Routine     Status: Abnormal    Collection Time: 01/31/25  1:39 AM    Specimen: Urine, clean catch   Result Value Ref Range    Urine Culture >100,000 CFU/ML Escherichia coli (A) N/A       Susceptibility    Escherichia coli -  (no method available)     Ampicillin 4 Sensitive      Cefazolin <=4 Sensitive      Ciprofloxacin <=0.25 Sensitive      Gentamicin <=1 Sensitive      Meropenem <=0.25 Sensitive      Levofloxacin <=0.12 Sensitive      Nitrofurantoin <=16 Sensitive      Piperacillin + Tazobactam <=4 Sensitive      Trimethoprim/Sulfa <=20 Sensitive      No results for input(s): \"COLORUR\", \"CLARITY\", \"SPECGRAVITY\", \"GLUUR\", \"BILUR\", \"KETUR\", \"BLOODURINE\", \"PHURINE\", \"PROUR\", \"UROBILINOGEN\", \"NITRITE\", \"LEUUR\",  \"WBCUR\", \"RBCUR\", \"BACUR\", \"EPIUR\" in the last 168 hours.    Interval Radiology:   Reviewed personally  XR CHEST AP PORTABLE  (CPT=71045)    Result Date: 2/10/2025  CONCLUSION:  Postoperative changes of cardiothoracic surgery with stable cardiac and mediastinal contours.  Stable positioning of left chest wall ICD.  Moderate, diffuse interstitial opacities noted throughout the lungs with patchy alveolar component of the left mid/upper lung, slightly progressed in the interim.  Findings may reflect pulmonary edema or infectious/inflammatory process.  No associated pleural effusion or pneumothorax.   LOCATION:  [id number]      Dictated by (CST): Lamar Mendes MD on 2/10/2025 at 7:33 AM     Finalized by (CST): Lamar Mendes MD on 2/10/2025 at 7:34 AM       XR CHEST AP PORTABLE  (CPT=71045)    Result Date: 2/9/2025  CONCLUSION:  1. No significant improvement in extensive bilateral left worse than right infiltrates.  Findings are again concerning for pneumonia.  Superimposed pulmonary edema is not excluded. 2. Stable small left pleural effusion.    LOCATION:  [facility]      Dictated by (CST): Brendon Shannon MD on 2/09/2025 at 5:24 AM     Finalized by (CST): Brendon Shannon MD on 2/09/2025 at 5:26 AM       XR CHEST AP PORTABLE  (CPT=71045)    Result Date: 2/8/2025  CONCLUSION:  Worsening airspace consolidation in the left upper and mid lung field and slight worsening patchy airspace infiltrate in the right perihilar lung.  Findings are concerning for pneumonia.  Superimposed pulmonary edema is not excluded.  The infiltrates should be followed to resolution to exclude any underlying lung lesion.   LOCATION:  [facility]      Dictated by (CST): Brendon Shannon MD on 2/08/2025 at 7:36 AM     Finalized by (CST): Brendon Shannon MD on 2/08/2025 at 7:38 AM       XR CHEST AP PORTABLE  (CPT=71045)    Result Date: 2/5/2025  CONCLUSION:  Postoperative changes of cardiothoracic surgery with stable cardiac and mediastinal contours.   Stable positioning of left chest wall ICD.  There continues to be a perihilar distribution of interstitial opacities with increasing patchy alveolar component of the left upper lobe.  Pulmonary edema is favored, though an infectious/inflammatory process could have a similar appearance.  No associated pleural effusion or pneumothorax.   LOCATION:  [facility]      Dictated by (CST): Lamar Mendes MD on 2/05/2025 at 3:06 PM     Finalized by (CST): Lamar Mendes MD on 2/05/2025 at 3:08 PM       XR FOOT, COMPLETE (MIN 3 VIEWS), LEFT (CPT=73630)    Result Date: 2/4/2025  CONCLUSION:  Degenerative and arthritic changes are seen in the ankle and foot.  There is no focal bony destructive lesion seen.  No soft tissue gas identified.   LOCATION:  [facility]   Dictated by (CST): Brendon Shannon MD on 2/04/2025 at 1:28 PM     Finalized by (CST): Brendon Shannon MD on 2/04/2025 at 1:31 PM       XR VIDEO SWALLOW (CPT=74230)    Result Date: 2/3/2025  PROCEDURE:  XR VIDEO SWALLOW (CPT=74230)  TECHNIQUE:  Video fluoroscopic swallowing study was performed in cooperation with the speech pathologist.  Barium of varying consistencies was administered orally with patient in lateral projection.  COMPARISON:  None.  INDICATIONS:  c/f aspiration, CXR with ggo  PATIENT STATED HISTORY: (As transcribed by Technologist)  Patient offered no additional history at this time.   CINE CAPTURES:  5 FLUORSCOPY TIME:  55 seconds RADIATION DOSE (AIR KERMA PRODUCT):  203.7 uGy/m2   FINDINGS:  PHARYNGEAL PHASE:  Normal for age. ASPIRATION:  None. PENETRATION:  Transient penetration with thin barium by cup. OTHER:  Mild residual vallecular barium with the thicker consistencies.  PLEASE SEE SPEECH PATHOLOGIST REPORT FOR FULL ASSESSMENT OF SWALLOWING MECHANISM.   LOCATION:  [facility]    Dictated by (CST): Lamar Mendes MD on 2/03/2025 at 2:06 PM     Finalized by (CST): Lamar Mendes MD on 2/03/2025 at 2:06 PM       CT CHEST (CONTRAST ONLY) ABDOMEN (W+WO) PELVIS (W+)  (FVA=23530/16022)    Result Date: 2/2/2025  CONCLUSION:   1. No evidence of pulmonary embolus.  2. Interlobular septal thickening along with pleural effusions is suggestive of fluid overload.  3. Ground-glass consolidations in the left lung are noted.  This may be due to infectious process.  However, this could also be due to pulmonary edema.  4. Cholelithiasis is incidentally noted.  An acute inflammatory process in the abdomen and pelvis is not identified.     LOCATION:  [id number]   Dictated by (CST): Donato Maldonado MD on 2/02/2025 at 1:59 PM     Finalized by (CST): Donato Maldonado MD on 2/02/2025 at 2:06 PM       XR CHEST AP PORTABLE  (CPT=71045)    Result Date: 2/2/2025  CONCLUSION:  Findings are suggestive of increased fluid overload.   LOCATION:  [place] [id number]      Dictated by (CST): Donato Maldonado MD on 2/02/2025 at 12:11 PM     Finalized by (CST): Donato Maldonado MD on 2/02/2025 at 12:11 PM       XR CHEST AP PORTABLE  (CPT=71045)    Result Date: 1/31/2025  CONCLUSION:  No focal consolidation.  Please see details as above.   ED M.D. notified of these findings with preliminary radiology report from [facility] services.   LOCATION:  [facility]      Dictated by (CST): Saida Andrade MD on 1/31/2025 at 8:29 AM     Finalized by (CST): Saida Andrade MD on 1/31/2025 at 8:30 AM       XR CHEST AP PORTABLE  (CPT=71045)    Result Date: 1/31/2025  CONCLUSION:  Interval placement right internal jugular central venous catheter which is appropriately position.  There is no evidence of complication.   LOCATION:  [facility]      Dictated by (CST): Jordan Tilley MD on 1/31/2025 at 7:15 AM     Finalized by (CST): Jordan Tilley MD on 1/31/2025 at 7:16 AM       CT STROKE CTA BRAIN/CTA NECK (W IV)(CPT=70496/53618)    Result Date: 1/31/2025  CONCLUSION:  1. Encephalomalacia right frontal and parietal lobe and multifocal small vessel ischemic change are noted consistent with chronic ischemia. 2. Intracranial CT angiogram demonstrates no large  vessel occlusion or aneurysm. 3. CT angiogram of neck demonstrates no significant stenosis or dissection.   Preliminary report was reviewed and there is no significant discrepancy.    LOCATION:  [facility]   Dictated by (CST): Jordan Tilley MD on 1/31/2025 at 6:06 AM     Finalized by (CST): Jordan Tilley MD on 1/31/2025 at 6:10 AM       CT STROKE BRAIN (NO IV)(CPT=70450)    Result Date: 1/31/2025  CONCLUSION:  1. There are chronic ischemic changes with multifocal encephalomalacia right frontal and parietal lobe and periventricular small vessel ischemic change. 2. There is atrophy. 3. There is no specific evidence of an acute abnormality.  Preliminary report was reviewed and there is no significant discrepancy.    LOCATION:  [facility]   Dictated by (CST): Jordan Tilley MD on 1/31/2025 at 6:03 AM     Finalized by (CST): Jordan Tilley MD on 1/31/2025 at 6:06 AM        ASSESSMENT  Acute hypoxic respiratory failure due to pneumonia, pulmonary edema  Pneumonia, possible aspiration vs nosocomial  Influenza infection  Sepsis due to E coli bacteremia, UTI  Afib with RVR  Leukocytosis related to above  Shock related to sepsis, now resolved  CAD/CABG, HTN, HLD  Hx CVA    PLAN  Continue close monitoring of respiratory status, wean o2 for sats >89%  O2 walk prior to discharge  Continue abx -now on keflex per ID  Complete tamiflu  Start duonebs + hypertonic and budesonide given continued cough/congestion  Mobilize as able, use IS  Cardiology following/managing afib  Possible discharge tomorrow    Osmany Bang MD

## 2025-02-12 NOTE — PLAN OF CARE
Patient received at 1930. Alert & oriented, occasionally forgetful. 2L o2 nasal cannula.Dyspnea on exertion. Frequent coughing spells some relief with prn cough medications. Atrial paced on tele monitor, on eliquis. Denies cardiac symptoms. Primo and brief in place. Tolerating medications one at a time with applesauce. Denies pain. Ambulating with walker and x1 person assist. Pt refusing to sleep in bed, requesting to sleep in chair tonight. Frequent rounding and fall precautions in place.     Addendum: around 0600 pt having difficulty coughing up secretions and holding his breath with swallowing each pill given whole in applesauce. speech consult ordered for swallow re-eval. Oxygen increased to 3L o2 nasal cannula. Pt refusing to get into bed requesting to stay sitting up in the chair     Problem: Patient/Family Goals  Goal: Patient/Family Long Term Goal  Description: Patient's Long Term Goal: dc to [facility]  Stay out of  hospital 2-11    Interventions:  - pt/ot  - pain control  - off oxygen  - med compliance, follow ups    - See additional Care Plan goals for specific interventions  Outcome: Progressing     Problem: RESPIRATORY - ADULT  Goal: Achieves optimal ventilation and oxygenation  Description: INTERVENTIONS:  - Assess for changes in respiratory status  - Assess for changes in mentation and behavior  - Position to facilitate oxygenation and minimize respiratory effort  - Oxygen supplementation based on oxygen saturation or ABGs  - Provide Smoking Cessation handout, if applicable  - Encourage broncho-pulmonary hygiene including cough, deep breathe, Incentive Spirometry  - Assess the need for suctioning and perform as needed  - Assess and instruct to report SOB or any respiratory difficulty  - Respiratory Therapy support as indicated  - Manage/alleviate anxiety  - Monitor for signs/symptoms of CO2 retention  Outcome: Progressing

## 2025-02-12 NOTE — PROGRESS NOTES
[facility]  Cardiology Progress Note    Jose Alfredo Solis Patient Status:  Inpatient    1940 MRN [id number]   Location [facility] 8NE-A Attending Landy Keen,    Hosp Day # 12 PCP MIKAYLA BAUGH, MD ROSEANN       Subjective:  Feels a little better  Still SOB and has persistent cough    --------------------------------------------------------------------------------------------------------------------------------  ROS 12 systems reviewed and at baseline, pertinent findings above.      History:  Past Medical History:    High cholesterol    Hypertension    Hypothyroid    Stroke (HCC)     Past Surgical History:   Procedure Laterality Date    Cabg      Other surgical history      prostate surgery     Family History   Problem Relation Age of Onset    Other (Other) Father         leukemia      reports that he quit smoking about 56 years ago. He has never used smokeless tobacco. He reports current alcohol use. He reports that he does not use drugs.    Objective:   Temp: 97.9 °F (36.6 °C)  Pulse: 76  Resp: 18  BP: 139/52    Intake/Output:     Intake/Output Summary (Last 24 hours) at 2025 0915  Last data filed at 2025 0806  Gross per 24 hour   Intake 960 ml   Output 950 ml   Net 10 ml       Physical Exam:  General: NAD.  HEENT: Normocephalic.  Neck: Supple.  Cardiac: Irreg irreg. S1, S2 normal. No murmur.  Lungs: Diminished.  Extremities: No edema.    Assessment:    Acute hypoxic respiratory failure secondary to influenza/PNA  Urosepsis  PAF  CAD s/p CABG  HTN  DLP      Plan:  Viral PNA, slowly improved. Defer management to pulmonary  Continue gentle diuresis. No evidence of significant volume overload on exam  Afib with reasonable rate control. This is likely to improve further as PNA resolves. Continue BB, amiodarone, Eliquis    Discussed with patient. Questions answered.    Please call with questions.     Level of care: L3    Yoan Quigley MD  Interventional Cardiology  [place]  [facility]    2/12/2025  9:15 AM

## 2025-02-12 NOTE — SLP NOTE
ADULT SWALLOWING EVALUATION    ASSESSMENT    ASSESSMENT/OVERALL IMPRESSION:  Patient is an 84 y/o male known to this service for prior dysphagia assessment and management. New SLP order received to evaluate oropharyngeal swallow d/t concern for aspiration yesterday evening. Patient received alert in bedside chair. He reported ongoing chronic cough, but feels this is improved from previously. Patient reported good tolerance of PO intake. He was able to recall previously recommended aspiration precautions, but was unable to recall previously recommended pharyngeal strengthening exercises.    Patient continued to present with mildly impaired pharyngeal swallow characterized by impaired coordination. Patient is able to compensate with use of brief oral bolus hold and hard effortful swallow. Bolus acceptance was adequate without evidence of anterior bolus loss. Brief oral bolus hold utilize independently. No overt s/s of aspiration observed and patient denied odynophagia and globus sensation across consistencies.     Recommend patient continue a regular diet and thin liquids. Bolus size and rate of intake should be limited. Utilize brief oral bolus hold and effortful swallow. Recommend medications be administered one at a time in a pureed bolus. SLP will continue to follow to monitor diet tolerance and reinforce aspiration precautions. Education provided re: results and recommendations.         RECOMMENDATIONS   Diet Recommendations - Solids: Regular  Diet Recommendations - Liquids: Thin Liquids                        Compensatory Strategies Recommended: Alternate consistencies  Aspiration Precautions: Upright position;Slow rate;Small bites;Small sips  Medication Administration Recommendations: One pill at a time;Whole in puree  Treatment Plan/Recommendations: Aspiration precautions    HISTORY   MEDICAL HISTORY  Reason for Referral: R/O aspiration    Problem List  Principal Problem:    Disorientation  Active Problems:     Sepsis due to undetermined organism (HCC)    Sepsis with hypotension (HCC)    Hyponatremia    Thrombocytopenia    Metabolic acidosis    Pyelonephritis    Pneumonia of left upper lobe due to infectious organism    Acute hypoxic respiratory failure (HCC)      Past Medical History  Past Medical History:    High cholesterol    Hypertension    Hypothyroid    Stroke (HCC)       Prior Living Situation: Home with support  Diet Prior to Admission: Regular;Thin liquids  Precautions: Aspiration    Patient/Family Goals: none stated    SWALLOWING HISTORY  Current Diet Consistency: Regular;Thin liquids  Dysphagia History: as above  Imaging Results:   CXR 2/10/25  CONCLUSION:  Postoperative changes of cardiothoracic surgery with stable cardiac and mediastinal contours.  Stable positioning of left chest wall ICD.  Moderate, diffuse interstitial opacities noted throughout the lungs with patchy alveolar component of   the left mid/upper lung, slightly progressed in the interim.  Findings may reflect pulmonary edema or infectious/inflammatory process.  No associated pleural effusion or pneumothorax.         LOCATION:  [place] [id number]                  Dictated by (CST): Lamar Mendes MD on 2/10/2025 at 7:33 AM       Finalized by (CST): Lamar Mendes MD on 2/10/2025 at 7:34 AM     SUBJECTIVE       OBJECTIVE   ORAL MOTOR EXAMINATION  Dentition: Functional  Symmetry: Within Functional Limits  Strength: Within Functional Limits  Tone: Within Functional Limits  Range of Motion: Within Functional Limits  Rate of Motion: Within Functional Limits    Voice Quality: Clear  Respiratory Status: Nasal cannula  Consistencies Trialed: Thin liquids;Puree;Hard solid  Method of Presentation: Self presentation  Patient Positioned: Upright;Midline    Oral Phase of Swallow: Within Functional Limits  Bolus Retrieval: Intact  Bilabial Seal: Intact  Bolus Formation: Impaired  Bolus Propulsion: Impaired  Mastication: Impaired  Retention: Impaired    Pharyngeal Phase of  Swallow: Appears Impaired  Laryngeal Elevation Timing: Appears impaired  Laryngeal Elevation Strength: Appears impaired  Laryngeal Elevation Coordination: Appears impaired  (Please note: Silent aspiration cannot be evaluated clinically. Videofluoroscopic Swallow Study is required to rule-out silent aspiration.)    Esophageal Phase of Swallow: No complaints consistent with possible esophageal involvement  Comments: NA              GOALS  Goal #1 The patient will tolerate regular consistency and thin liquids without overt signs or symptoms of aspiration with 90 % accuracy over 1-2 session(s).  In Progress   Goal #2 The patient will utilize compensatory strategies as outlined by  VFSS including Small bites, Small sips, Alternate liquids/solids, bolus hold, effortful swallow with mild assistance 90 % of the time across 2 sessions.    In Progress   Goal #3     Goal #4     Goal #5     Goal #6     Goal #7     Goal #8       FOLLOW UP  Treatment Plan/Recommendations: Aspiration precautions  Duration: 1 week  Follow Up Needed (Documentation Required): Yes  SLP Follow-up Date: 02/13/25    Thank you for your referral.   If you have any questions, please contact MARYANNE Summers

## 2025-02-12 NOTE — PROGRESS NOTES
[facility]   part of [facility]     Hospitalist Progress Note     Jose Alfredo Solis Patient Status:  Inpatient    1940 MRN [id number]   Location [facility] 4SW-A Attending Henna Edge MD   Hosp Day # 12 PCP MIKAYLA BAUGH, MD ROSEANN     Chief Complaint: Weakness     Subjective:     Main complaint is cough     Objective:    Review of Systems:   A comprehensive review of systems was completed; pertinent positive and negatives stated in subjective.    Vital signs:  Temp:  [97.9 °F (36.6 °C)-98.3 °F (36.8 °C)] 97.9 °F (36.6 °C)  Pulse:  [70-76] 76  Resp:  [16-22] 18  BP: (128-144)/(50-58) 139/52  SpO2:  [90 %-98 %] 90 %    Physical Exam:    General: No acute distress  Respiratory: No wheezes, no rhonchi, congested  Cardiovascular: S1, S2, regular rate and rhythm  Abdomen: Soft, Non-tender, non-distended, positive bowel sounds  Neuro: No new focal deficits.   Extremities: LE edema with chronic venous stasis changes       Diagnostic Data:    Labs:  Recent Labs   Lab 25  0415 25  0542 25  0546 02/10/25  04525  0721   WBC 8.9 18.1* 15.5* 16.9* 17.0*   HGB 11.2* 10.3* 10.4* 9.5* 10.2*   MCV 89.4 88.7 91.1 88.5 88.9   .0 222.0 298.0 342.0 403.0   BAND 2 10 1  --  2       Recent Labs   Lab 25  0542 25  0546 02/10/25  0454 25  0549   * 117* 158* 125*   BUN 22 13 13 15   CREATSERUM 0.78 0.65* 0.63* 0.63*   CA 8.3* 8.6* 8.6* 9.4   ALB 3.2  --   --   --    * 134* 133* 135*   K 3.4*  3.4* 3.9  3.9 3.8 3.9   CL 96* 98 99 99   CO2 29.0 25.0 27.0 27.0   ALKPHO 47  --   --   --    AST 65*  --   --   --    ALT 11  --   --   --    BILT 1.4*  --   --   --    TP 5.6*  --   --   --        Estimated Glomerular Filtration Rate: 92.1 mL/min/1.73m2 (A) (by CKD-EPI based on SCr of 0.63 mg/dL (L)).    No results for input(s): \"TROP\", \"TROPHS\", \"CK\" in the last 168 hours.      No results for input(s): \"PTP\", \"INR\" in the last 168 hours.                  Microbiology    Hospital Encounter on 01/31/25   1. Blood Culture     Status: None (Preliminary result)    Collection Time: 02/07/25  7:12 PM    Specimen: Blood,peripheral   Result Value Ref Range    Blood Culture Result No Growth 4 Days N/A   2. Urine Culture, Routine     Status: Abnormal    Collection Time: 01/31/25  1:39 AM    Specimen: Urine, clean catch   Result Value Ref Range    Urine Culture >100,000 CFU/ML Escherichia coli (A) N/A       Susceptibility    Escherichia coli -  (no method available)     Ampicillin 4 Sensitive      Cefazolin <=4 Sensitive      Ciprofloxacin <=0.25 Sensitive      Gentamicin <=1 Sensitive      Meropenem <=0.25 Sensitive      Levofloxacin <=0.12 Sensitive      Nitrofurantoin <=16 Sensitive      Piperacillin + Tazobactam <=4 Sensitive      Trimethoprim/Sulfa <=20 Sensitive          Imaging: Reviewed in Epic.    Medications:    budesonide  0.5 mg Nebulization 2 times daily    sodium chloride  3 mL Nebulization QID    ipratropium-albuterol  3 mL Nebulization QID    guaiFENesin ER  600 mg Oral BID    cephalexin  500 mg Oral QID    metoprolol succinate ER  50 mg Oral 2x Daily(Beta Blocker)    aspirin  81 mg Oral Daily    atorvastatin  20 mg Oral Nightly    apixaban  5 mg Oral BID    amiodarone  200 mg Oral Daily    pantoprazole  40 mg Oral QAM AC    tamsulosin  0.4 mg Oral Daily       Assessment & Plan:      #Septic shock due to Ecoli UTI and bacteremia- shock resolved, off pressors  -Repeat blood Cx 2/1 NGTD   -Keflex     #Acute hypoxic respiratory failure 2/2 PNA  -wean O2 as able  -Antibiotics  -airway clearance   -Pulmonology on consult     #Influenza A  -Tamiflu     #paroxysmal Afib  -BB, Amiodarone   -DOAC    #CAD s/p CABG  #Hypothyroidism - Levothyroxine   #HLD  #BPH - flomax    POC:  Discharge planning, ideally next 24 hours      Supplementary Documentation:     Quality:  DVT Mechanical Prophylaxis:   SCDs, Early ambuation  DVT Pharmacologic Prophylaxis   Medication     apixaban (Eliquis) tab 5 mg         DVT Pharmacologic prophylaxis: Aspirin 81 mg      Code Status: Full Code  Garnica: External urinary catheter in place  Garnica Duration (in days):   Central line:    CARMINA: 2/13/2025    Discharge is dependent on: clinical   At this point Mr. Solis is expected to be discharge to: tbd     The 21st Century Cures Act makes medical notes like these available to patients in the interest of transparency. Please be advised this is a medical document. Medical documents are intended to carry relevant information, facts as evident, and the clinical opinion of the practitioner. The medical note is intended as peer to peer communication and may appear blunt or direct. It is written in medical language and may contain abbreviations or verbiage that are unfamiliar.              **Certification      PHYSICIAN Certification of Need for Inpatient Hospitalization - Initial Certification    Patient will require inpatient services that will reasonably be expected to span two midnight's based on the clinical documentation in H+P.   Based on patients current state of illness, I anticipate that, after discharge, patient will require TBD.

## 2025-02-12 NOTE — PLAN OF CARE
Shift Note:  Assumed care of patient. Patient alert and oriented x4, hard of hearing without hearing aids, glasses from home.   Patient on 1L nasal cannula, baseline room air, attempted to take all O2 of and desaturated to 88%, denies difficulty breathing, lung sounds diminished. Congested cough now, able to obtain sputum sample for testing.   Denies any cardiac symptoms, A paced with HR in the 90s on tele. Denies pain at this time.   Continent of bowel and bladder, last BM 2/10, primofit in place with brief.   Wound care to bilateral arm skin tears, per patient, theses occurred in hospital.   Ambulates 1 assist and a walker, call light within reach, tolerating care well.     POC:  - Wean O2? O2 walk if able   - AYDEE when able to discharge per MD  - PO ABX        Problem: Patient/Family Goals  Goal: Patient/Family Long Term Goal  Description: Patient's Long Term Goal: dc to [facility]E  Stay out of  hospital 2-11    Interventions:  - pt/ot  - pain control  - off oxygen  - med compliance, follow ups    - See additional Care Plan goals for specific interventions  Outcome: Progressing  Goal: Patient/Family Short Term Goal  Description: Patient's Short Term Goal: pain control  Walk in hallway 2-11    Interventions:   - pharm and nonpharm interventions  - walk with PT/RN/PCT, sit up in chair    - See additional Care Plan goals for specific interventions  Outcome: Progressing     Problem: CARDIOVASCULAR - ADULT  Goal: Maintains optimal cardiac output and hemodynamic stability  Description: INTERVENTIONS:  - Monitor vital signs, rhythm, and trends  - Monitor for bleeding, hypotension and signs of decreased cardiac output  - Evaluate effectiveness of vasoactive medications to optimize hemodynamic stability  - Monitor arterial and/or venous puncture sites for bleeding and/or hematoma  - Assess quality of pulses, skin color and temperature  - Assess for signs of decreased coronary artery perfusion - ex. Angina  - Evaluate fluid  balance, assess for edema, trend weights  Outcome: Progressing     Problem: RESPIRATORY - ADULT  Goal: Achieves optimal ventilation and oxygenation  Description: INTERVENTIONS:  - Assess for changes in respiratory status  - Assess for changes in mentation and behavior  - Position to facilitate oxygenation and minimize respiratory effort  - Oxygen supplementation based on oxygen saturation or ABGs  - Provide Smoking Cessation handout, if applicable  - Encourage broncho-pulmonary hygiene including cough, deep breathe, Incentive Spirometry  - Assess the need for suctioning and perform as needed  - Assess and instruct to report SOB or any respiratory difficulty  - Respiratory Therapy support as indicated  - Manage/alleviate anxiety  - Monitor for signs/symptoms of CO2 retention  Outcome: Progressing     Problem: RISK FOR INFECTION - ADULT  Goal: Absence of fever/infection during anticipated neutropenic period  Description: INTERVENTIONS  - Monitor WBC  - Administer growth factors as ordered  - Implement neutropenic guidelines  Outcome: Progressing

## 2025-02-13 LAB
BASOPHILS # BLD: 0 X10(3) UL (ref 0–0.2)
BASOPHILS NFR BLD: 0 %
EOSINOPHIL # BLD: 0 X10(3) UL (ref 0–0.7)
EOSINOPHIL NFR BLD: 0 %
ERYTHROCYTE [DISTWIDTH] IN BLOOD BY AUTOMATED COUNT: 14.8 %
HCT VFR BLD AUTO: 31 %
HGB BLD-MCNC: 10.5 G/DL
LYMPHOCYTES NFR BLD: 13 %
LYMPHOCYTES NFR BLD: 2.81 X10(3) UL (ref 1–4)
MCH RBC QN AUTO: 30.7 PG (ref 26–34)
MCHC RBC AUTO-ENTMCNC: 33.9 G/DL (ref 31–37)
MCV RBC AUTO: 90.6 FL
MONOCYTES # BLD: 3.67 X10(3) UL (ref 0.1–1)
MONOCYTES NFR BLD: 17 %
MORPHOLOGY: NORMAL
MYELOCYTES # BLD: 0.22 X10(3) UL
MYELOCYTES NFR BLD: 1 %
NEUTROPHILS # BLD AUTO: 13.55 X10 (3) UL (ref 1.5–7.7)
NEUTROPHILS NFR BLD: 63 %
NEUTS BAND NFR BLD: 6 %
NEUTS HYPERSEG # BLD: 14.9 X10(3) UL (ref 1.5–7.7)
PLATELET # BLD AUTO: 533 10(3)UL (ref 150–450)
PLATELET MORPHOLOGY: NORMAL
RBC # BLD AUTO: 3.42 X10(6)UL
TOTAL CELLS COUNTED BLD: 100
WBC # BLD AUTO: 21.6 X10(3) UL (ref 4–11)

## 2025-02-13 PROCEDURE — 99232 SBSQ HOSP IP/OBS MODERATE 35: CPT | Performed by: INTERNAL MEDICINE

## 2025-02-13 RX ORDER — METOPROLOL SUCCINATE 50 MG/1
50 TABLET, EXTENDED RELEASE ORAL
Qty: 60 TABLET | Refills: 0 | Status: SHIPPED | OUTPATIENT
Start: 2025-02-13

## 2025-02-13 RX ORDER — HYDROCODONE BITARTRATE AND HOMATROPINE METHYLBROMIDE ORAL SOLUTION 5; 1.5 MG/5ML; MG/5ML
5 LIQUID ORAL EVERY 8 HOURS PRN
Qty: 120 ML | Refills: 0 | Status: SHIPPED | OUTPATIENT
Start: 2025-02-13

## 2025-02-13 RX ORDER — DILTIAZEM HYDROCHLORIDE 30 MG/1
30 TABLET, FILM COATED ORAL EVERY 12 HOURS SCHEDULED
Status: DISCONTINUED | OUTPATIENT
Start: 2025-02-13 | End: 2025-02-14

## 2025-02-13 RX ORDER — DILTIAZEM HYDROCHLORIDE 30 MG/1
30 TABLET, FILM COATED ORAL EVERY 12 HOURS SCHEDULED
Qty: 60 TABLET | Refills: 2 | Status: SHIPPED | OUTPATIENT
Start: 2025-02-13

## 2025-02-13 RX ORDER — FLUTICASONE FUROATE AND VILANTEROL 100; 25 UG/1; UG/1
1 POWDER RESPIRATORY (INHALATION) DAILY
Qty: 1 EACH | Refills: 1 | Status: SHIPPED | OUTPATIENT
Start: 2025-02-13 | End: 2025-02-28

## 2025-02-13 NOTE — PLAN OF CARE
Assumed care of patient at 0730.  Alert and oriented x4.  On room air with adequate O2 saturations. Reports improvement in cough and breathing.  Afib on tele, rates are controlled.  Continent of bowel and bladder.  Up x1 and walker.  Pt and sister updated on plan of care, verbalized understanding.     Addendum:  Pt kept for IV abx per ID. Pt and sister updated on plan and updated he will not be discharging today. Dr. Keen also aware.    Problem: Patient/Family Goals  Goal: Patient/Family Long Term Goal  Description: Patient's Long Term Goal: dc to [facility]  Stay out of  hospital 2-11    Interventions:  - pt/ot  - pain control  - off oxygen  - med compliance, follow ups    - See additional Care Plan goals for specific interventions  Outcome: Progressing  Goal: Patient/Family Short Term Goal  Description: Patient's Short Term Goal: pain control  Walk in hallway 2-11    Interventions:   - pharm and nonpharm interventions  - walk with PT/RN/PCT, sit up in chair    - See additional Care Plan goals for specific interventions  Outcome: Progressing     Problem: CARDIOVASCULAR - ADULT  Goal: Maintains optimal cardiac output and hemodynamic stability  Description: INTERVENTIONS:  - Monitor vital signs, rhythm, and trends  - Monitor for bleeding, hypotension and signs of decreased cardiac output  - Evaluate effectiveness of vasoactive medications to optimize hemodynamic stability  - Monitor arterial and/or venous puncture sites for bleeding and/or hematoma  - Assess quality of pulses, skin color and temperature  - Assess for signs of decreased coronary artery perfusion - ex. Angina  - Evaluate fluid balance, assess for edema, trend weights  Outcome: Progressing     Problem: RESPIRATORY - ADULT  Goal: Achieves optimal ventilation and oxygenation  Description: INTERVENTIONS:  - Assess for changes in respiratory status  - Assess for changes in mentation and behavior  - Position to facilitate oxygenation and minimize respiratory  effort  - Oxygen supplementation based on oxygen saturation or ABGs  - Provide Smoking Cessation handout, if applicable  - Encourage broncho-pulmonary hygiene including cough, deep breathe, Incentive Spirometry  - Assess the need for suctioning and perform as needed  - Assess and instruct to report SOB or any respiratory difficulty  - Respiratory Therapy support as indicated  - Manage/alleviate anxiety  - Monitor for signs/symptoms of CO2 retention  Outcome: Progressing     Problem: RISK FOR INFECTION - ADULT  Goal: Absence of fever/infection during anticipated neutropenic period  Description: INTERVENTIONS  - Monitor WBC  - Administer growth factors as ordered  - Implement neutropenic guidelines  Outcome: Progressing

## 2025-02-13 NOTE — CM/SW NOTE
PRUDENCIO was made aware by [facility] that they do not that they will have a isolation/private room until Sunday. Authorization for A[facility]E expires tomorrow, anticipate that it won't be approved again since pt has been improving. [facility] can provide a private/isolation room at [facility] as long as [facility] is [facility] to [facility] . PRUDENCIO will follow up.       Addendum: PRUDENCIO met with pt and later called pt's sister, Moon, to update on above. Explained that pt will have to [facility]  for A[facility]E due to no bed available at [facility] until Sunday. PRUDENCIO requested [facility] to change the insurance auth approval to [facility] . PRUDENCIO updated Cathleen at [facility] of the update. Anticipate pt should be ready for discharge today. Moon agreeable to the plan.     Betty GARZA, ASHERW  Discharge Planner

## 2025-02-13 NOTE — PROGRESS NOTES
INFECTIOUS DISEASE  PROGRESS NOTE            [facility]    Jose Alfredo Solis Patient Status:  Inpatient    1940 MRN [id number]   [facility] 4SW-A Attending Henna Edge MD   Hosp Day # 13 PCP MIKAYLA BAUGH, MD ROSEANN     Antibiotics: #13  cephalexin    Subjective:  Resting on room air, dry cough    Objective:  Temp:  [97.8 °F (36.6 °C)-98.8 °F (37.1 °C)] 97.8 °F (36.6 °C)  Pulse:  [] 101  Resp:  [20] 20  BP: (104-117)/(59-95) 117/65  SpO2:  [65 %-100 %] 96 %    General: awake alert  Vital signs:Temp:  [97.8 °F (36.6 °C)-98.8 °F (37.1 °C)] 97.8 °F (36.6 °C)  Pulse:  [] 101  Resp:  [20] 20  BP: (104-117)/(59-95) 117/65  SpO2:  [65 %-100 %] 96 %  HEENT: Moist mucous membranes. Extraocular muscles are intact.  Neck: supple no masses  Respiratory: Non labored, symmetric excursion, normal respirations  Abdomen: Soft, nontender, nondistended.   Musculoskeletal: joints: no swelling   Integument: No lesions. No erythema. No open wounds.  Labs:     COVID-19 Lab Results    COVID-19  Lab Results   Component Value Date    COVID19 Not Detected 2025    COVID19 Not Detected 2025       Pro-Calcitonin  No results for input(s): \"PCT\" in the last 168 hours.      Cardiac  No results for input(s): \"TROP\", \"PBNP\" in the last 168 hours.      Creatinine Kinase  No results for input(s): \"CK\" in the last 168 hours.    Inflammatory Markers  No results for input(s): \"CRP\", \"ARIK\", \"LDH\", \"DDIMER\" in the last 168 hours.    Recent Labs   Lab 25  0542 25  0546 25  0813   RBC 3.36*   < > 3.42*   HGB 10.3*   < > 10.5*   HCT 29.8*   < > 31.0*   MCV 88.7   < > 90.6   MCH 30.7   < > 30.7   MCHC 34.6   < > 33.9   RDW 14.6   < > 14.8   NEPRELIM 13.08*   < > 13.55*   WBC 18.1*   < > 21.6*   .0   < > 533.0*   NEUT 73   < > 63   LYMPH 8   < > 13   MON 9   < > 17   EOS 0   < > 0   NRBC 1*  --   --     < > = values in this interval not displayed.         Recent Labs   Lab  02/08/25  0542 02/09/25  0546 02/10/25  0454 02/12/25  0549   * 117* 158* 125*   BUN 22 13 13 15   CREATSERUM 0.78 0.65* 0.63* 0.63*   CA 8.3* 8.6* 8.6* 9.4   ALB 3.2  --   --   --    * 134* 133* 135*   K 3.4*  3.4* 3.9  3.9 3.8 3.9   CL 96* 98 99 99   CO2 29.0 25.0 27.0 27.0   ALKPHO 47  --   --   --    AST 65*  --   --   --    ALT 11  --   --   --    BILT 1.4*  --   --   --    TP 5.6*  --   --   --        No results found for: \"VANCT\"  Microbiology    Hospital Encounter on 01/31/25   1. Blood Culture     Status: None    Collection Time: 02/07/25  7:12 PM    Specimen: Blood,peripheral   Result Value Ref Range    Blood Culture Result No Growth 5 Days N/A   2. Urine Culture, Routine     Status: Abnormal    Collection Time: 01/31/25  1:39 AM    Specimen: Urine, clean catch   Result Value Ref Range    Urine Culture >100,000 CFU/ML Escherichia coli (A) N/A       Susceptibility    Escherichia coli -  (no method available)     Ampicillin 4 Sensitive      Cefazolin <=4 Sensitive      Ciprofloxacin <=0.25 Sensitive      Gentamicin <=1 Sensitive      Meropenem <=0.25 Sensitive      Levofloxacin <=0.12 Sensitive      Nitrofurantoin <=16 Sensitive      Piperacillin + Tazobactam <=4 Sensitive      Trimethoprim/Sulfa <=20 Sensitive        Problem list reviewed:  Patient Active Problem List   Diagnosis    Trochanteric bursitis of left hip    Disorientation    Sepsis due to undetermined organism (HCC)    Sepsis with hypotension (HCC)    Hyponatremia    Thrombocytopenia    Metabolic acidosis    Pyelonephritis    Pneumonia of left upper lobe due to infectious organism    Acute hypoxic respiratory failure (HCC)   Radiology    CXR 2/10    Evaluation process:    Evaluation involves complex antimicrobial therapy, counseling, and treatment such as decisions to institute, withdrawal, or adjust antibiotic therapy, and or educational discussions with patient, family, and collaborating treatment  team          ASSESSMENT/PLAN:  1. Influenza infection  Concerns for developing pneumonia   Elevated WBC, no fever, and on room air    Sptum enterobacter  Meropenem pendign S    2. E coli UTI and bacteremia  -reported slow stream in the past  Currently voiding with external cath  Has been voiding without signifciant residual  CT no stone or hydro      2. Cardiomyopathy, CHF      3. Gallstones    4. CAD SP CABG/Afib          Ahsan Norton MD, MD  [facility]  [phone]

## 2025-02-13 NOTE — DISCHARGE INSTRUCTIONS
Wound Cleaning and Dressings:  Left upper arm, posterior  Wound cleansing:  normal saline  Wound cleaning frequency: Daily  Wound product: Honey gel, Alginate, ABD pad, and Conforming roll  Dressing change frequency:  Change dressing daily and/or as needed     Wound Cleaning and Dressings:  Right arm  Wound cleansing:  normal saline  Wound cleaning frequency: Daily  Wound product: vaseline gauze, gauze and conforming roll  Dressing change frequency:  Change dressing daily and/or as needed

## 2025-02-13 NOTE — PHYSICAL THERAPY NOTE
Attempted to see Pt this AM - RN aware of attempt.  Pt refused to complete O2 walk until after breakfast.  Pt had not ordered breakfast tray, and no such tray has arrived at this point.     Will f/u later today if time permits, after all other patients are attempted per tentative schedule.

## 2025-02-13 NOTE — PROGRESS NOTES
Progress Note  Jose Alfredo Solis Patient Status:  Inpatient    1940 MRN [id number]   Location [facility] 8NE-A Attending Landy Keen, DO   Hosp Day # 13 PCP MIKAYLA BAUGH, MD ROSEANN     Subjective:  Pt states he is feeling much better; cough and SOB improved. Denies orthopnea.     Objective:  /61 (BP Location: Right arm)   Pulse 96   Temp 98.1 °F (36.7 °C) (Oral)   Resp 20   Ht 5' 8\" (1.727 m)   Wt 159 lb 6.3 oz (72.3 kg)   SpO2 (!) 72%   BMI 24.24 kg/m²     Telemetry: afib 90's, up to 120's with ambulation    Intake/Output:    Intake/Output Summary (Last 24 hours) at 2025 1055  Last data filed at 2025 0900  Gross per 24 hour   Intake 540 ml   Output 550 ml   Net -10 ml       Last 3 Weights   25 0446 159 lb 6.3 oz (72.3 kg)   25 0312 157 lb 13.6 oz (71.6 kg)   25 0403 161 lb 6 oz (73.2 kg)   02/10/25 0456 163 lb 12.8 oz (74.3 kg)   25 0430 160 lb 11.5 oz (72.9 kg)   25 0447 161 lb 6 oz (73.2 kg)   25 0553 157 lb 3 oz (71.3 kg)   25 0600 164 lb 7.4 oz (74.6 kg)   25 0200 164 lb 0.4 oz (74.4 kg)   25 0004 167 lb 5.3 oz (75.9 kg)   25 0000 159 lb 9.8 oz (72.4 kg)   25 0450 158 lb 4.6 oz (71.8 kg)   25 0146 167 lb 15.9 oz (76.2 kg)   18 0956 160 lb (72.6 kg)       Labs:  Recent Labs   Lab 25  0546 02/10/25  0454 25  0549   * 158* 125*   BUN 13 13 15   CREATSERUM 0.65* 0.63* 0.63*   EGFRCR 91 92 92   CA 8.6* 8.6* 9.4   * 133* 135*   K 3.9  3.9 3.8 3.9   CL 98 99 99   CO2 25.0 27.0 27.0     Recent Labs   Lab 02/10/25  0454 25  0721 25  0813   RBC 3.14* 3.32* 3.42*   HGB 9.5* 10.2* 10.5*   HCT 27.8* 29.5* 31.0*   MCV 88.5 88.9 90.6   MCH 30.3 30.7 30.7   MCHC 34.2 34.6 33.9   RDW 14.7 14.6 14.8   NEPRELIM 12.08* 11.20* 13.55*   WBC 16.9* 17.0* 21.6*   .0 403.0 533.0*         No results for input(s): \"TROP\", \"TROPHS\", \"CK\" in the last 168  hours.    Diagnostics:  No results found.   Review of Systems   Cardiovascular:  Negative for chest pain, dyspnea on exertion, leg swelling, orthopnea and palpitations.   Respiratory:  Positive for cough. Negative for shortness of breath.        Physical Exam:    Gen: alert, oriented x 3, NAD  Heent: pupils equal, reactive. Mucous membranes moist.   Neck: no jvd  Cardiac: irregular rate and rhythm, normal S1,S2, no murmur, clicks, rub or gallop  Lungs: bilateral scattered crackles  Abd: soft, NT/ND +bs  Ext: no edema  Skin: Warm, dry  Neuro: No focal deficits      Medications:     budesonide  0.5 mg Nebulization 2 times daily    sodium chloride  3 mL Nebulization QID    furosemide  20 mg Oral Daily    ipratropium-albuterol  3 mL Nebulization QID    guaiFENesin ER  600 mg Oral BID    cephalexin  500 mg Oral QID    metoprolol succinate ER  50 mg Oral 2x Daily(Beta Blocker)    aspirin  81 mg Oral Daily    atorvastatin  20 mg Oral Nightly    apixaban  5 mg Oral BID    amiodarone  200 mg Oral Daily    pantoprazole  40 mg Oral QAM AC    tamsulosin  0.4 mg Oral Daily      dilTIAZem Stopped (02/08/25 0509)    sodium chloride 100 mL/hr at 02/08/25 0222       Assessment:  Acute Hypoxic Respiratory Failure - Influenza/Possible Superimposed PNA  Now on room air  On tamiflu, antibx  WBC uptrending  Pulm, ID following  Sepsis d/t UTI, E Coli Bacteremia  On antibx  Repeat BC NGTD  ID following  Paroxysmal Atrial Fibrillation, Hx [employer] DC PPM  Initially w/RVR on IV diltiazem; now rate controlled - afib 90's on tele   Echo w/LVEF 55-60%, no RWMA, normal diastolic function, mod increased LA volume, mild AR, mild MR, PASP 34mmHg, IVC normal  On amiodarone 200mg po daily, Toprol 50mg po BID  [id number] A/C with Eliquis 5mg po BID  Follows w/[facility] as OP  Hx CAD s/p CABG  On asa, bb, statin  WALTER - now improved  Cr 1.50 on admit, now resolved  ACEI on hold   Hypertension - controlled  On toprol; enalapril on  hold  Hyperlipidemia - on atorvastatin 20mg     Plan:  HR controlled at rest w/increase to 120's with ambulation/activity - continue toprol 50mg po BID, amiodarone 200mg po daily. Will likely improve as respiratory status continues to recover  Continue Eliquis  Continue gentle diuresis with lasix 20mg po daily   Further recommendations per pulm, ID  Discharge planning to [facility] - ok from cardiology standpoint    Plan of care discussed with patient, RN.    Leatha Chad, APRN  2/13/2025  10:55 AM  [phone] [facility]  [phone] [facility]      I have personally seen and examined patient.   I have independently formulated assessment and plan  I agree with the AP note    Doing well. No complaints.   No CP or pressure  No SOB    PE:   Irregular  No edema    Chronic heart failure with preserved LV function  CAD s/p CABG  Atrial fibrillation  Influenza/Pneumonia    Will add short acting cardizem 30 mg bid.   Cont with metoprolol 50 mg bid  Cont with eliquis    Euvolemic on exam. Cont with PO lasix    [facility] to [facility]    Cardiology service will sign off. Please call with questions.     Thank you for the opportunity to participate in the care of your patient.     Sydnie Boyce MD  Interventional Cardiologist  [facility]

## 2025-02-13 NOTE — PROGRESS NOTES
INFECTIOUS DISEASE  PROGRESS NOTE            [facility]    Jose Alfredo Solis Patient Status:  Inpatient    1940 MRN [id number]   [facility] 4SW-A Attending Henna Edge MD   Hosp Day # 13 PCP MIKAYLA BAUGH, MD ROSEANN     Antimicrobials: #13  Keflex D#3  S/p Tamiflu x 5 days    Subjective: Patient seen and examined today. Continues to feel better, less cough and SOB. On room air. Afebrile.     Objective:  Temp:  [98 °F (36.7 °C)-98.8 °F (37.1 °C)] 98.1 °F (36.7 °C)  Pulse:  [] 96  Resp:  [20] 20  BP: (104-111)/(59-95) 105/61  SpO2:  [65 %-100 %] 72 %    General: awake alert, on room air. Sitting up in a chair.   Vital signs:Temp:  [98 °F (36.7 °C)-98.8 °F (37.1 °C)] 98.1 °F (36.7 °C)  Pulse:  [] 96  Resp:  [20] 20  BP: (104-111)/(59-95) 105/61  SpO2:  [65 %-100 %] 72 %  HEENT: Moist mucous membranes. Extraocular muscles are intact.  Neck: supple, no masses  Respiratory: Less bilateral crackles  Abdomen: Soft, nontender, nondistended.   Musculoskeletal: Movement of extremities; joints: no swelling   Integument: Multiple bandages in place with skin tears noted    Labs:     COVID-19 Lab Results    COVID-19  Lab Results   Component Value Date    COVID19 Not Detected 2025    COVID19 Not Detected 2025       Pro-Calcitonin  No results for input(s): \"PCT\" in the last 168 hours.      Cardiac  No results for input(s): \"TROP\", \"PBNP\" in the last 168 hours.      Creatinine Kinase  No results for input(s): \"CK\" in the last 168 hours.    Inflammatory Markers  No results for input(s): \"CRP\", \"ARIK\", \"LDH\", \"DDIMER\" in the last 168 hours.    Recent Labs   Lab 25  0542 25  0546 25  0813   RBC 3.36*   < > 3.42*   HGB 10.3*   < > 10.5*   HCT 29.8*   < > 31.0*   MCV 88.7   < > 90.6   MCH 30.7   < > 30.7   MCHC 34.6   < > 33.9   RDW 14.6   < > 14.8   NEPRELIM 13.08*   < > 13.55*   WBC 18.1*   < > 21.6*   .0   < > 533.0*   NEUT 73   < > 63   LYMPH 8   < >  13   MON 9   < > 17   EOS 0   < > 0   NRBC 1*  --   --     < > = values in this interval not displayed.         Recent Labs   Lab 02/08/25  0542 02/09/25  0546 02/10/25  0454 02/12/25  0549   * 117* 158* 125*   BUN 22 13 13 15   CREATSERUM 0.78 0.65* 0.63* 0.63*   CA 8.3* 8.6* 8.6* 9.4   ALB 3.2  --   --   --    * 134* 133* 135*   K 3.4*  3.4* 3.9  3.9 3.8 3.9   CL 96* 98 99 99   CO2 29.0 25.0 27.0 27.0   ALKPHO 47  --   --   --    AST 65*  --   --   --    ALT 11  --   --   --    BILT 1.4*  --   --   --    TP 5.6*  --   --   --        No results found for: \"VANCT\"  Microbiology    Hospital Encounter on 01/31/25   1. Blood Culture     Status: None    Collection Time: 02/07/25  7:12 PM    Specimen: Blood,peripheral   Result Value Ref Range    Blood Culture Result No Growth 5 Days N/A   2. Urine Culture, Routine     Status: Abnormal    Collection Time: 01/31/25  1:39 AM    Specimen: Urine, clean catch   Result Value Ref Range    Urine Culture >100,000 CFU/ML Escherichia coli (A) N/A       Susceptibility    Escherichia coli -  (no method available)     Ampicillin 4 Sensitive      Cefazolin <=4 Sensitive      Ciprofloxacin <=0.25 Sensitive      Gentamicin <=1 Sensitive      Meropenem <=0.25 Sensitive      Levofloxacin <=0.12 Sensitive      Nitrofurantoin <=16 Sensitive      Piperacillin + Tazobactam <=4 Sensitive      Trimethoprim/Sulfa <=20 Sensitive        Problem list reviewed:  Patient Active Problem List   Diagnosis    Trochanteric bursitis of left hip    Disorientation    Sepsis due to undetermined organism (HCC)    Sepsis with hypotension (HCC)    Hyponatremia    Thrombocytopenia    Metabolic acidosis    Pyelonephritis    Pneumonia of left upper lobe due to infectious organism    Acute hypoxic respiratory failure (HCC)       Imaging: Reviewed.       ASSESSMENT/PLAN:  Influenza A with superimposed bacterial pneumonia. Sputum culture with enterobacter and candida albicans- doubt candida is of clinical  significance. Completed course of Tamiflu.  E. coli UTI and bacteremia. Bcx 2/2 + 1/31, repeat bcxs 2/1 negative. CT no stone or hydro.  Acute respiratory failure with hypoxia, d/t #1 in addition to pulmonary edema. Resolved.  Leukocytosis. WBC elevated earlier this admission d/t #2, resolved. Now again with leukocytosis, assume d/t #1  Cardiomyopathy, CHF, CAD SP CABG/Afib  Gallstones    PLAN:  -keflex dc and meropenem started today for E. Coli UTI/bacteremia and Enterobacter pna.   -follow respiratory status, pulm following  -follow WBC and temps  -follow new blood cultures from 2/7- ngtd  -await final sputum culture    Discussed case with patient and Dr. Cierra Stevens PA-C

## 2025-02-13 NOTE — PROGRESS NOTES
[facility]   part of [facility]     Hospitalist Progress Note     Jose Alfredo Solis Patient Status:  Inpatient    1940 MRN [id number]   Location [facility] 4SW-A Attending Henna Edge MD   Hosp Day # 13 PCP MIKAYLA BAUGH, MD ROSEANN     Chief Complaint: Weakness     Subjective:     Feeling okay, no new complaints     Objective:    Review of Systems:   A comprehensive review of systems was completed; pertinent positive and negatives stated in subjective.    Vital signs:  Temp:  [97.8 °F (36.6 °C)-98.6 °F (37 °C)] 97.8 °F (36.6 °C)  Pulse:  [] 101  Resp:  [20] 20  BP: (104-117)/(59-91) 117/65  SpO2:  [65 %-100 %] 96 %    Physical Exam:    General: No acute distress  Respiratory: No wheezes, no rhonchi, congested  Cardiovascular: S1, S2, regular rate and rhythm  Abdomen: Soft, Non-tender, non-distended, positive bowel sounds  Neuro: No new focal deficits.   Extremities: LE edema with chronic venous stasis changes       Diagnostic Data:    Labs:  Recent Labs   Lab 25  0542 25  0546 02/10/25  0454 25  0721 25  0813   WBC 18.1* 15.5* 16.9* 17.0* 21.6*   HGB 10.3* 10.4* 9.5* 10.2* 10.5*   MCV 88.7 91.1 88.5 88.9 90.6   .0 298.0 342.0 403.0 533.0*   BAND 10 1  --  2 6       Recent Labs   Lab 25  0542 25  0546 02/10/25  0454 25  0549   * 117* 158* 125*   BUN 22 13 13 15   CREATSERUM 0.78 0.65* 0.63* 0.63*   CA 8.3* 8.6* 8.6* 9.4   ALB 3.2  --   --   --    * 134* 133* 135*   K 3.4*  3.4* 3.9  3.9 3.8 3.9   CL 96* 98 99 99   CO2 29.0 25.0 27.0 27.0   ALKPHO 47  --   --   --    AST 65*  --   --   --    ALT 11  --   --   --    BILT 1.4*  --   --   --    TP 5.6*  --   --   --        Estimated Glomerular Filtration Rate: 92.1 mL/min/1.73m2 (A) (by CKD-EPI based on SCr of 0.63 mg/dL (L)).    No results for input(s): \"TROP\", \"TROPHS\", \"CK\" in the last 168 hours.      No results for input(s): \"PTP\", \"INR\" in the last 168 hours.                  Microbiology    Hospital Encounter on 01/31/25   1. Blood Culture     Status: None    Collection Time: 02/07/25  7:12 PM    Specimen: Blood,peripheral   Result Value Ref Range    Blood Culture Result No Growth 5 Days N/A   2. Urine Culture, Routine     Status: Abnormal    Collection Time: 01/31/25  1:39 AM    Specimen: Urine, clean catch   Result Value Ref Range    Urine Culture >100,000 CFU/ML Escherichia coli (A) N/A       Susceptibility    Escherichia coli -  (no method available)     Ampicillin 4 Sensitive      Cefazolin <=4 Sensitive      Ciprofloxacin <=0.25 Sensitive      Gentamicin <=1 Sensitive      Meropenem <=0.25 Sensitive      Levofloxacin <=0.12 Sensitive      Nitrofurantoin <=16 Sensitive      Piperacillin + Tazobactam <=4 Sensitive      Trimethoprim/Sulfa <=20 Sensitive          Imaging: Reviewed in Epic.    Medications:    meropenem  500 mg Intravenous Q8H    dilTIAZem  30 mg Oral 2 times per day    budesonide  0.5 mg Nebulization 2 times daily    sodium chloride  3 mL Nebulization QID    furosemide  20 mg Oral Daily    ipratropium-albuterol  3 mL Nebulization QID    guaiFENesin ER  600 mg Oral BID    metoprolol succinate ER  50 mg Oral 2x Daily(Beta Blocker)    aspirin  81 mg Oral Daily    atorvastatin  20 mg Oral Nightly    apixaban  5 mg Oral BID    amiodarone  200 mg Oral Daily    pantoprazole  40 mg Oral QAM AC    tamsulosin  0.4 mg Oral Daily       Assessment & Plan:      #Septic shock due to Ecoli UTI and bacteremia- shock resolved, off pressors  -Repeat blood Cx 2/1 NGTD   -Keflex     #Acute hypoxic respiratory failure 2/2 PNA  -wean O2 as able  -Antibiotics per ID  -airway clearance   -Pulmonology on consult     #Influenza A  -Tamiflu     #paroxysmal Afib  -BB, Amiodarone   -DOAC    #CAD s/p CABG  #Hypothyroidism - Levothyroxine   #HLD  #BPH - flomax    POC:  Discharge planning, ideally next 24 hours      Supplementary Documentation:     Quality:  DVT Mechanical Prophylaxis:    SCDs, Early ambuation  DVT Pharmacologic Prophylaxis   Medication    apixaban (Eliquis) tab 5 mg         DVT Pharmacologic prophylaxis: Aspirin 81 mg      Code Status: Full Code  Garnica: External urinary catheter in place  Garnica Duration (in days):   Central line:    CARMINA: 2/14/2025    Discharge is dependent on: clinical   At this point Mr. Solis is expected to be discharge to: tbd     The 21st Century Cures Act makes medical notes like these available to patients in the interest of transparency. Please be advised this is a medical document. Medical documents are intended to carry relevant information, facts as evident, and the clinical opinion of the practitioner. The medical note is intended as peer to peer communication and may appear blunt or direct. It is written in medical language and may contain abbreviations or verbiage that are unfamiliar.              **Certification      PHYSICIAN Certification of Need for Inpatient Hospitalization - Initial Certification    Patient will require inpatient services that will reasonably be expected to span two midnight's based on the clinical documentation in H+P.   Based on patients current state of illness, I anticipate that, after discharge, patient will require TBD.

## 2025-02-13 NOTE — PLAN OF CARE
A&Ox4. Patient on room air. Continuous pulse ox maintained. Robitussin/hydromet administered for cough. Pt has shortness of breath on exertion. Afib/atrial paced on telemetry. Pt denies chest pain. Continent of bowel. Primofit in place. No complaints of pain. Patient in bed with fall precautions in place. Discussed plan of care with patient. Patient verbalizes understanding.    Plan of care: PO keflex, dc to [facility]     Problem: Patient/Family Goals  Goal: Patient/Family Long Term Goal  Description: Patient's Long Term Goal: dc to [facility]  Stay out of  hospital 2-11    Interventions:  - pt/ot  - pain control  - off oxygen  - med compliance, follow ups    - See additional Care Plan goals for specific interventions  Outcome: Progressing  Goal: Patient/Family Short Term Goal  Description: Patient's Short Term Goal: pain control  Walk in hallway 2-11    Interventions:   - pharm and nonpharm interventions  - walk with PT/RN/PCT, sit up in chair    - See additional Care Plan goals for specific interventions  Outcome: Progressing     Problem: CARDIOVASCULAR - ADULT  Goal: Maintains optimal cardiac output and hemodynamic stability  Description: INTERVENTIONS:  - Monitor vital signs, rhythm, and trends  - Monitor for bleeding, hypotension and signs of decreased cardiac output  - Evaluate effectiveness of vasoactive medications to optimize hemodynamic stability  - Monitor arterial and/or venous puncture sites for bleeding and/or hematoma  - Assess quality of pulses, skin color and temperature  - Assess for signs of decreased coronary artery perfusion - ex. Angina  - Evaluate fluid balance, assess for edema, trend weights  Outcome: Progressing     Problem: RESPIRATORY - ADULT  Goal: Achieves optimal ventilation and oxygenation  Description: INTERVENTIONS:  - Assess for changes in respiratory status  - Assess for changes in mentation and behavior  - Position to facilitate oxygenation and minimize respiratory effort  - Oxygen  supplementation based on oxygen saturation or ABGs  - Provide Smoking Cessation handout, if applicable  - Encourage broncho-pulmonary hygiene including cough, deep breathe, Incentive Spirometry  - Assess the need for suctioning and perform as needed  - Assess and instruct to report SOB or any respiratory difficulty  - Respiratory Therapy support as indicated  - Manage/alleviate anxiety  - Monitor for signs/symptoms of CO2 retention  Outcome: Progressing     Problem: RISK FOR INFECTION - ADULT  Goal: Absence of fever/infection during anticipated neutropenic period  Description: INTERVENTIONS  - Monitor WBC  - Administer growth factors as ordered  - Implement neutropenic guidelines  Outcome: Progressing

## 2025-02-13 NOTE — SLP NOTE
SPEECH DAILY NOTE - INPATIENT    ASSESSMENT & PLAN   ASSESSMENT  Pt seen for dysphagia tx to assess tolerance with recommended diet, ensure proper utilization of aspiration precautions and provide pt/family education.  Patient received alert and oriented in bedside chair. He was saturating well on room air. Patient reported good tolerance of PO intake. He was able to recall and report previously recommended aspiration precautions. He was also able to implement these precautions with PO trials of thin liquids. No overt s/s of aspiration observed throughout. Recommend patient continue a regular diet and thin liquids with precautions listed below. SLP will continue to follow per POC.    Diet Recommendations - Solids: Regular  Diet Recommendations - Liquids: Thin Liquids    Compensatory Strategies Recommended: Alternate consistencies  Aspiration Precautions: Upright position;Slow rate;Small bites;Small sips  Medication Administration Recommendations: One pill at a time;Whole in puree    Patient Experiencing Pain: No                Treatment Plan  Treatment Plan/Recommendations: Aspiration precautions    Interdisciplinary Communication: NA          GOALS  Goal #1 The patient will tolerate regular consistency and thin liquids without overt signs or symptoms of aspiration with 90 % accuracy over 1-2 session(s).  In Progress   Goal #2 The patient will utilize compensatory strategies as outlined by  VFSS including Small bites, Small sips, Alternate liquids/solids, bolus hold, effortful swallow with mild assistance 90 % of the time across 2 sessions.     In Progress   Goal #3       Goal #4       Goal #5       Goal #6       Goal #7       Goal #8            FOLLOW UP  Follow Up Needed (Documentation Required): Yes  SLP Follow-up Date: 02/15/25  Duration: 1 week    Session: 1    If you have any questions, please contact MARYANNE Summers

## 2025-02-13 NOTE — PROGRESS NOTES
[facility] Progress Note     SUBJECTIVE/Interval history:  No acute events overnight, he feels better today. Less dyspnea and cough. No pain. Remains on RA    Review of Systems:   A comprehensive 14 point review of systems was completed.   Pertinent positives and negatives noted in the HPI.    Medications  Reviewed personally   budesonide  0.5 mg Nebulization 2 times daily    sodium chloride  3 mL Nebulization QID    furosemide  20 mg Oral Daily    ipratropium-albuterol  3 mL Nebulization QID    guaiFENesin ER  600 mg Oral BID    cephalexin  500 mg Oral QID    metoprolol succinate ER  50 mg Oral 2x Daily(Beta Blocker)    aspirin  81 mg Oral Daily    atorvastatin  20 mg Oral Nightly    apixaban  5 mg Oral BID    amiodarone  200 mg Oral Daily    pantoprazole  40 mg Oral QAM AC    tamsulosin  0.4 mg Oral Daily       HYDROcodone-homatropine    guaiFENesin-codeine    dilTIAZem    metoclopramide    acetaminophen    melatonin    polyethylene glycol (PEG 3350)    sennosides    bisacodyl    ondansetron    benzonatate    glycerin-hypromellose-    sodium chloride    OBJECTIVE:  Vitals:    02/12/25 1904 02/12/25 2315 02/13/25 0441 02/13/25 0446   BP: 110/67 (!) 108/91 104/90 111/59   BP Location: Right arm Right arm Right arm Right arm   Pulse: 111 96 109 104   Resp: 20 20 20 20   Temp: 98.6 °F (37 °C) 98.3 °F (36.8 °C) 98.3 °F (36.8 °C) 98.4 °F (36.9 °C)   TempSrc: Oral Oral Oral Oral   SpO2: 100% 92% (!) 65% 93%   Weight:    159 lb 6.3 oz (72.3 kg)   Height:           Vital signs in last 24 hours:  Blood pressure 111/59, pulse 104, temperature 98.4 °F (36.9 °C), temperature source Oral, resp. rate 20, height 5' 8\" (1.727 m), weight 159 lb 6.3 oz (72.3 kg), SpO2 93%.     Intake/Output:  I/O last 3 completed shifts:  In: 890 [P.O.:890]  Out: 800 [Urine:800]       Physical Exam:  General: Appears alert, oriented x3. No acute distress  Neurologic:No focal deficits noted.  Alert.   Oriented.  Lungs:IMPROVED today with less rhonchi and no wheeze. No distress  Heart:RRR no m  Abdomen: soft, nondistended, no rigidity, no reaction with palpation. No hernias noted  Extremities:No overt deformities, edema    Lab Data Review:   Recent Labs   Lab 02/09/25  0546 02/10/25  0454 02/12/25  0549   * 158* 125*   BUN 13 13 15   CREATSERUM 0.65* 0.63* 0.63*   CA 8.6* 8.6* 9.4   * 133* 135*   K 3.9  3.9 3.8 3.9   CL 98 99 99   CO2 25.0 27.0 27.0     Recent Labs   Lab 02/09/25  0546 02/10/25  0454 02/11/25  0721   RBC 3.37* 3.14* 3.32*   HGB 10.4* 9.5* 10.2*   HCT 30.7* 27.8* 29.5*   MCV 91.1 88.5 88.9   MCH 30.9 30.3 30.7   MCHC 33.9 34.2 34.6   RDW 14.8 14.7 14.6   NEPRELIM 10.07* 12.08* 11.20*   WBC 15.5* 16.9* 17.0*   .0 342.0 403.0     No results for input(s): \"BNP\" in the last 168 hours.  No results for input(s): \"TROP\", \"CK\" in the last 168 hours.  No results for input(s): \"PT\", \"INR\", \"PTT\" in the last 168 hours.  No results for input(s): \"ABGPHT\", \"HRMYQR3H\", \"PBYKU7B\", \"ABGHCO3\", \"ABGBE\", \"TEMP\", \"SIVAKUMAR\", \"SITE\", \"DEV\", \"THGB\" in the last 168 hours.    Invalid input(s): \"UBI02EDS\", \"CHOB\"    Other Labs:  Interval Culture Data:   Hospital Encounter on 01/31/25   1. Blood Culture     Status: None    Collection Time: 02/07/25  7:12 PM    Specimen: Blood,peripheral   Result Value Ref Range    Blood Culture Result No Growth 5 Days N/A   2. Urine Culture, Routine     Status: Abnormal    Collection Time: 01/31/25  1:39 AM    Specimen: Urine, clean catch   Result Value Ref Range    Urine Culture >100,000 CFU/ML Escherichia coli (A) N/A       Susceptibility    Escherichia coli -  (no method available)     Ampicillin 4 Sensitive      Cefazolin <=4 Sensitive      Ciprofloxacin <=0.25 Sensitive      Gentamicin <=1 Sensitive      Meropenem <=0.25 Sensitive      Levofloxacin <=0.12 Sensitive      Nitrofurantoin <=16 Sensitive      Piperacillin + Tazobactam <=4 Sensitive      Trimethoprim/Sulfa  <=20 Sensitive      No results for input(s): \"COLORUR\", \"CLARITY\", \"SPECGRAVITY\", \"GLUUR\", \"BILUR\", \"KETUR\", \"BLOODURINE\", \"PHURINE\", \"PROUR\", \"UROBILINOGEN\", \"NITRITE\", \"LEUUR\", \"WBCUR\", \"RBCUR\", \"BACUR\", \"EPIUR\" in the last 168 hours.    Interval Radiology:   Reviewed personally  XR CHEST AP PORTABLE  (CPT=71045)    Result Date: 2/10/2025  CONCLUSION:  Postoperative changes of cardiothoracic surgery with stable cardiac and mediastinal contours.  Stable positioning of left chest wall ICD.  Moderate, diffuse interstitial opacities noted throughout the lungs with patchy alveolar component of the left mid/upper lung, slightly progressed in the interim.  Findings may reflect pulmonary edema or infectious/inflammatory process.  No associated pleural effusion or pneumothorax.   LOCATION:  [id number]      Dictated by (CST): Lamar Mendes MD on 2/10/2025 at 7:33 AM     Finalized by (CST): Lamar Mendes MD on 2/10/2025 at 7:34 AM       XR CHEST AP PORTABLE  (CPT=71045)    Result Date: 2/9/2025  CONCLUSION:  1. No significant improvement in extensive bilateral left worse than right infiltrates.  Findings are again concerning for pneumonia.  Superimposed pulmonary edema is not excluded. 2. Stable small left pleural effusion.    LOCATION:  [facility]      Dictated by (CST): Brendon Shannon MD on 2/09/2025 at 5:24 AM     Finalized by (CST): Brendon Shannon MD on 2/09/2025 at 5:26 AM       XR CHEST AP PORTABLE  (CPT=71045)    Result Date: 2/8/2025  CONCLUSION:  Worsening airspace consolidation in the left upper and mid lung field and slight worsening patchy airspace infiltrate in the right perihilar lung.  Findings are concerning for pneumonia.  Superimposed pulmonary edema is not excluded.  The infiltrates should be followed to resolution to exclude any underlying lung lesion.   LOCATION:  [facility]      Dictated by (CST): Brendon Shannon MD on 2/08/2025 at 7:36 AM     Finalized by (CST): Brendon Shannon MD on 2/08/2025 at 7:38  AM       XR CHEST AP PORTABLE  (CPT=71045)    Result Date: 2/5/2025  CONCLUSION:  Postoperative changes of cardiothoracic surgery with stable cardiac and mediastinal contours.  Stable positioning of left chest wall ICD.  There continues to be a perihilar distribution of interstitial opacities with increasing patchy alveolar component of the left upper lobe.  Pulmonary edema is favored, though an infectious/inflammatory process could have a similar appearance.  No associated pleural effusion or pneumothorax.   LOCATION:  [facility]      Dictated by (CST): Lamar Mendes MD on 2/05/2025 at 3:06 PM     Finalized by (CST): Lamar Mendes MD on 2/05/2025 at 3:08 PM       XR FOOT, COMPLETE (MIN 3 VIEWS), LEFT (CPT=73630)    Result Date: 2/4/2025  CONCLUSION:  Degenerative and arthritic changes are seen in the ankle and foot.  There is no focal bony destructive lesion seen.  No soft tissue gas identified.   LOCATION:  [facility]   Dictated by (CST): Brendon Shannon MD on 2/04/2025 at 1:28 PM     Finalized by (CST): Brendon Shannon MD on 2/04/2025 at 1:31 PM       XR VIDEO SWALLOW (CPT=74230)    Result Date: 2/3/2025  PROCEDURE:  XR VIDEO SWALLOW (CPT=74230)  TECHNIQUE:  Video fluoroscopic swallowing study was performed in cooperation with the speech pathologist.  Barium of varying consistencies was administered orally with patient in lateral projection.  COMPARISON:  None.  INDICATIONS:  c/f aspiration, CXR with ggo  PATIENT STATED HISTORY: (As transcribed by Technologist)  Patient offered no additional history at this time.   CINE CAPTURES:  5 FLUORSCOPY TIME:  55 seconds RADIATION DOSE (AIR KERMA PRODUCT):  203.7 uGy/m2   FINDINGS:  PHARYNGEAL PHASE:  Normal for age. ASPIRATION:  None. PENETRATION:  Transient penetration with thin barium by cup. OTHER:  Mild residual vallecular barium with the thicker consistencies.  PLEASE SEE SPEECH PATHOLOGIST REPORT FOR FULL ASSESSMENT OF SWALLOWING MECHANISM.   LOCATION:  [facility]    Dictated  by (CST): Lamar Mendes MD on 2/03/2025 at 2:06 PM     Finalized by (CST): Lamar Mendes MD on 2/03/2025 at 2:06 PM       CT CHEST (CONTRAST ONLY) ABDOMEN (W+WO) PELVIS (W+) (CPT=71260/05650)    Result Date: 2/2/2025  CONCLUSION:   1. No evidence of pulmonary embolus.  2. Interlobular septal thickening along with pleural effusions is suggestive of fluid overload.  3. Ground-glass consolidations in the left lung are noted.  This may be due to infectious process.  However, this could also be due to pulmonary edema.  4. Cholelithiasis is incidentally noted.  An acute inflammatory process in the abdomen and pelvis is not identified.     LOCATION:  [place] [id number]   Dictated by (CST): Donato Maldonado MD on 2/02/2025 at 1:59 PM     Finalized by (CST): Donato Maldonado MD on 2/02/2025 at 2:06 PM       XR CHEST AP PORTABLE  (CPT=71045)    Result Date: 2/2/2025  CONCLUSION:  Findings are suggestive of increased fluid overload.   LOCATION:  [place] [id number]      Dictated by (CST): Donato Maldonado MD on 2/02/2025 at 12:11 PM     Finalized by (CST): Donato Maldonado MD on 2/02/2025 at 12:11 PM       XR CHEST AP PORTABLE  (CPT=71045)    Result Date: 1/31/2025  CONCLUSION:  No focal consolidation.  Please see details as above.   ED M.D. notified of these findings with preliminary radiology report from [facility] services.   LOCATION:  [facility]      Dictated by (CST): Saida Andrade MD on 1/31/2025 at 8:29 AM     Finalized by (CST): Saida Andrade MD on 1/31/2025 at 8:30 AM       XR CHEST AP PORTABLE  (CPT=71045)    Result Date: 1/31/2025  CONCLUSION:  Interval placement right internal jugular central venous catheter which is appropriately position.  There is no evidence of complication.   LOCATION:  [facility]      Dictated by (CST): Jordan Tilley MD on 1/31/2025 at 7:15 AM     Finalized by (CST): Jordan Tilley MD on 1/31/2025 at 7:16 AM       CT STROKE CTA BRAIN/CTA NECK (W IV)(CPT=70496/77605)    Result Date: 1/31/2025  CONCLUSION:  1. Encephalomalacia  right frontal and parietal lobe and multifocal small vessel ischemic change are noted consistent with chronic ischemia. 2. Intracranial CT angiogram demonstrates no large vessel occlusion or aneurysm. 3. CT angiogram of neck demonstrates no significant stenosis or dissection.   Preliminary report was reviewed and there is no significant discrepancy.    LOCATION:  [facility]   Dictated by (CST): Jordan Tilley MD on 1/31/2025 at 6:06 AM     Finalized by (CST): Jordan Tilley MD on 1/31/2025 at 6:10 AM       CT STROKE BRAIN (NO IV)(CPT=70450)    Result Date: 1/31/2025  CONCLUSION:  1. There are chronic ischemic changes with multifocal encephalomalacia right frontal and parietal lobe and periventricular small vessel ischemic change. 2. There is atrophy. 3. There is no specific evidence of an acute abnormality.  Preliminary report was reviewed and there is no significant discrepancy.    LOCATION:  [facility]   Dictated by (CST): Jordan Tilley MD on 1/31/2025 at 6:03 AM     Finalized by (CST): Jordan Tilley MD on 1/31/2025 at 6:06 AM        ASSESSMENT  Acute hypoxic respiratory failure due to pneumonia, pulmonary edema  Pneumonia, possible aspiration vs nosocomial  Influenza infection  Sepsis due to E coli bacteremia, UTI  Afib with RVR  Leukocytosis related to above  Shock related to sepsis, now resolved  CAD/CABG, HTN, HLD  Hx CVA    PLAN  Continue close monitoring of respiratory status, wean o2 for sats >89%  O2 walk prior to discharge - plan for today  Continue abx -now on keflex per ID  Completed tamiflu  Continue scheduled nebs  Mobilize as able, use IS  Cardiology following/managing afib  Possible discharge today pending other services merecdez Bang MD

## 2025-02-14 VITALS
OXYGEN SATURATION: 92 % | BODY MASS INDEX: 24.15 KG/M2 | HEIGHT: 68 IN | HEART RATE: 86 BPM | WEIGHT: 159.38 LBS | DIASTOLIC BLOOD PRESSURE: 45 MMHG | SYSTOLIC BLOOD PRESSURE: 120 MMHG | TEMPERATURE: 99 F | RESPIRATION RATE: 18 BRPM

## 2025-02-14 LAB
BASOPHILS # BLD: 0 X10(3) UL (ref 0–0.2)
BASOPHILS NFR BLD: 0 %
EOSINOPHIL # BLD: 0 X10(3) UL (ref 0–0.7)
EOSINOPHIL NFR BLD: 0 %
ERYTHROCYTE [DISTWIDTH] IN BLOOD BY AUTOMATED COUNT: 14.6 %
HCT VFR BLD AUTO: 29.6 %
HGB BLD-MCNC: 9.9 G/DL
LYMPHOCYTES NFR BLD: 1.86 X10(3) UL (ref 1–4)
LYMPHOCYTES NFR BLD: 10 %
MCH RBC QN AUTO: 30 PG (ref 26–34)
MCHC RBC AUTO-ENTMCNC: 33.4 G/DL (ref 31–37)
MCV RBC AUTO: 89.7 FL
MONOCYTES # BLD: 2.98 X10(3) UL (ref 0.1–1)
MONOCYTES NFR BLD: 16 %
MORPHOLOGY: NORMAL
NEUTROPHILS # BLD AUTO: 11.45 X10 (3) UL (ref 1.5–7.7)
NEUTROPHILS NFR BLD: 72 %
NEUTS BAND NFR BLD: 2 %
NEUTS HYPERSEG # BLD: 13.76 X10(3) UL (ref 1.5–7.7)
PLATELET # BLD AUTO: 534 10(3)UL (ref 150–450)
PLATELET MORPHOLOGY: NORMAL
RBC # BLD AUTO: 3.3 X10(6)UL
TOTAL CELLS COUNTED BLD: 100
WBC # BLD AUTO: 18.6 X10(3) UL (ref 4–11)

## 2025-02-14 PROCEDURE — 99232 SBSQ HOSP IP/OBS MODERATE 35: CPT | Performed by: INTERNAL MEDICINE

## 2025-02-14 PROCEDURE — 99239 HOSP IP/OBS DSCHRG MGMT >30: CPT | Performed by: INTERNAL MEDICINE

## 2025-02-14 NOTE — PROGRESS NOTES
NURSING DISCHARGE NOTE    Discharged Rehab facility via Wheelchair.  Accompanied by  [facility]  Belongings Taken by patient/family.      IV and tele discontinued. Report called. Report given to [facility]. All paperwork and paper script sent with patient.

## 2025-02-14 NOTE — CM/SW NOTE
02/14/25 1355   Discharge disposition   Expected discharge disposition subacute   Post Acute Care Provider   ([facility])   Discharge transportation [facility]       Pt cleared to discharge to [facility] today. [facility] set up for 5:30pm.  PCS form completed/printed. RN updated. PRUDENCIO sent message to Cathleen oliveros [facility] to make aware of the update. PRUDENCIO spoke with pt's sister, Moon. Moon aware of the discharge plan and is agreeable to the cost associated w/ [facility] transport.     RN to call report: [phone]    [facility]: [phone]    Betty GARZA, LSW  Discharge Planner

## 2025-02-14 NOTE — PROGRESS NOTES
[facility] Progress Note     SUBJECTIVE/Interval history:  No acute events overnight, he feels the same today. Stable cough/dyspnea. Remain on RA      Review of Systems:   A comprehensive 14 point review of systems was completed.   Pertinent positives and negatives noted in the HPI.    Medications  Reviewed personally   meropenem  500 mg Intravenous Q8H    dilTIAZem  30 mg Oral 2 times per day    budesonide  0.5 mg Nebulization 2 times daily    sodium chloride  3 mL Nebulization QID    furosemide  20 mg Oral Daily    ipratropium-albuterol  3 mL Nebulization QID    guaiFENesin ER  600 mg Oral BID    metoprolol succinate ER  50 mg Oral 2x Daily(Beta Blocker)    aspirin  81 mg Oral Daily    atorvastatin  20 mg Oral Nightly    apixaban  5 mg Oral BID    amiodarone  200 mg Oral Daily    pantoprazole  40 mg Oral QAM AC    tamsulosin  0.4 mg Oral Daily       HYDROcodone-homatropine    guaiFENesin-codeine    dilTIAZem    metoclopramide    acetaminophen    melatonin    polyethylene glycol (PEG 3350)    sennosides    bisacodyl    ondansetron    benzonatate    glycerin-hypromellose-    sodium chloride    OBJECTIVE:  Vitals:    02/13/25 2202 02/13/25 2256 02/14/25 0404 02/14/25 0500   BP:  110/65 108/56 128/70   BP Location:  Right arm Right arm    Pulse:  107 102 96   Resp:  18 18    Temp:  98.1 °F (36.7 °C) 99.1 °F (37.3 °C)    TempSrc: Oral Oral Oral    SpO2:  95% 97% 94%   Weight:   159 lb 6.3 oz (72.3 kg)    Height:           Vital signs in last 24 hours:  Blood pressure 128/70, pulse 96, temperature 99.1 °F (37.3 °C), temperature source Oral, resp. rate 18, height 5' 8\" (1.727 m), weight 159 lb 6.3 oz (72.3 kg), SpO2 94%.     Intake/Output:  I/O last 3 completed shifts:  In: 1080 [P.O.:1080]  Out: 975 [Urine:975]       Physical Exam:  General: Appears alert, oriented x3. No acute distress  Neurologic:No focal deficits noted.  Alert.  Oriented.  Lungs:CTAB this am. No rhonchi and no  wheeze. No distress  Heart:RRR no m  Abdomen: soft, nondistended, no rigidity, no reaction with palpation. No hernias noted  Extremities:No overt deformities, edema    Lab Data Review:   Recent Labs   Lab 02/09/25  0546 02/10/25  0454 02/12/25  0549   * 158* 125*   BUN 13 13 15   CREATSERUM 0.65* 0.63* 0.63*   CA 8.6* 8.6* 9.4   * 133* 135*   K 3.9  3.9 3.8 3.9   CL 98 99 99   CO2 25.0 27.0 27.0     Recent Labs   Lab 02/11/25  0721 02/13/25  0813 02/14/25  0510   RBC 3.32* 3.42* 3.30*   HGB 10.2* 10.5* 9.9*   HCT 29.5* 31.0* 29.6*   MCV 88.9 90.6 89.7   MCH 30.7 30.7 30.0   MCHC 34.6 33.9 33.4   RDW 14.6 14.8 14.6   NEPRELIM 11.20* 13.55* 11.45*   WBC 17.0* 21.6* 18.6*   .0 533.0* 534.0*     No results for input(s): \"BNP\" in the last 168 hours.  No results for input(s): \"TROP\", \"CK\" in the last 168 hours.  No results for input(s): \"PT\", \"INR\", \"PTT\" in the last 168 hours.  No results for input(s): \"ABGPHT\", \"OWAIIC0X\", \"QBZWQ8L\", \"ABGHCO3\", \"ABGBE\", \"TEMP\", \"SIVAKUMAR\", \"SITE\", \"DEV\", \"THGB\" in the last 168 hours.    Invalid input(s): \"OUT46YHD\", \"CHOB\"    Other Labs:  Interval Culture Data:   Hospital Encounter on 01/31/25   1. Blood Culture     Status: None    Collection Time: 02/07/25  7:12 PM    Specimen: Blood,peripheral   Result Value Ref Range    Blood Culture Result No Growth 5 Days N/A   2. Urine Culture, Routine     Status: Abnormal    Collection Time: 01/31/25  1:39 AM    Specimen: Urine, clean catch   Result Value Ref Range    Urine Culture >100,000 CFU/ML Escherichia coli (A) N/A       Susceptibility    Escherichia coli -  (no method available)     Ampicillin 4 Sensitive      Cefazolin <=4 Sensitive      Ciprofloxacin <=0.25 Sensitive      Gentamicin <=1 Sensitive      Meropenem <=0.25 Sensitive      Levofloxacin <=0.12 Sensitive      Nitrofurantoin <=16 Sensitive      Piperacillin + Tazobactam <=4 Sensitive      Trimethoprim/Sulfa <=20 Sensitive      No results for input(s): \"COLORUR\",  \"CLARITY\", \"SPECGRAVITY\", \"GLUUR\", \"BILUR\", \"KETUR\", \"BLOODURINE\", \"PHURINE\", \"PROUR\", \"UROBILINOGEN\", \"NITRITE\", \"LEUUR\", \"WBCUR\", \"RBCUR\", \"BACUR\", \"EPIUR\" in the last 168 hours.    Interval Radiology:   Reviewed personally  XR CHEST AP PORTABLE  (CPT=71045)    Result Date: 2/10/2025  CONCLUSION:  Postoperative changes of cardiothoracic surgery with stable cardiac and mediastinal contours.  Stable positioning of left chest wall ICD.  Moderate, diffuse interstitial opacities noted throughout the lungs with patchy alveolar component of the left mid/upper lung, slightly progressed in the interim.  Findings may reflect pulmonary edema or infectious/inflammatory process.  No associated pleural effusion or pneumothorax.   LOCATION:  [id number]      Dictated by (CST): Lamar Mendes MD on 2/10/2025 at 7:33 AM     Finalized by (CST): Lamar Mendes MD on 2/10/2025 at 7:34 AM       XR CHEST AP PORTABLE  (CPT=71045)    Result Date: 2/9/2025  CONCLUSION:  1. No significant improvement in extensive bilateral left worse than right infiltrates.  Findings are again concerning for pneumonia.  Superimposed pulmonary edema is not excluded. 2. Stable small left pleural effusion.    LOCATION:  [facility]      Dictated by (CST): Brendon Shannon MD on 2/09/2025 at 5:24 AM     Finalized by (CST): Brendon Shannon MD on 2/09/2025 at 5:26 AM       XR CHEST AP PORTABLE  (CPT=71045)    Result Date: 2/8/2025  CONCLUSION:  Worsening airspace consolidation in the left upper and mid lung field and slight worsening patchy airspace infiltrate in the right perihilar lung.  Findings are concerning for pneumonia.  Superimposed pulmonary edema is not excluded.  The infiltrates should be followed to resolution to exclude any underlying lung lesion.   LOCATION:  [facility]      Dictated by (CST): Brendon Shannon MD on 2/08/2025 at 7:36 AM     Finalized by (CST): Brendon Shannon MD on 2/08/2025 at 7:38 AM       XR CHEST AP PORTABLE  (CPT=71045)    Result  Date: 2/5/2025  CONCLUSION:  Postoperative changes of cardiothoracic surgery with stable cardiac and mediastinal contours.  Stable positioning of left chest wall ICD.  There continues to be a perihilar distribution of interstitial opacities with increasing patchy alveolar component of the left upper lobe.  Pulmonary edema is favored, though an infectious/inflammatory process could have a similar appearance.  No associated pleural effusion or pneumothorax.   LOCATION:  [facility]      Dictated by (CST): Lamar Mendes MD on 2/05/2025 at 3:06 PM     Finalized by (CST): Lamar Mendes MD on 2/05/2025 at 3:08 PM       XR FOOT, COMPLETE (MIN 3 VIEWS), LEFT (CPT=73630)    Result Date: 2/4/2025  CONCLUSION:  Degenerative and arthritic changes are seen in the ankle and foot.  There is no focal bony destructive lesion seen.  No soft tissue gas identified.   LOCATION:  [facility]   Dictated by (CST): Brendon Shannon MD on 2/04/2025 at 1:28 PM     Finalized by (CST): Brendon Shannon MD on 2/04/2025 at 1:31 PM       XR VIDEO SWALLOW (CPT=74230)    Result Date: 2/3/2025  PROCEDURE:  XR VIDEO SWALLOW (CPT=74230)  TECHNIQUE:  Video fluoroscopic swallowing study was performed in cooperation with the speech pathologist.  Barium of varying consistencies was administered orally with patient in lateral projection.  COMPARISON:  None.  INDICATIONS:  c/f aspiration, CXR with ggo  PATIENT STATED HISTORY: (As transcribed by Technologist)  Patient offered no additional history at this time.   CINE CAPTURES:  5 FLUORSCOPY TIME:  55 seconds RADIATION DOSE (AIR KERMA PRODUCT):  203.7 uGy/m2   FINDINGS:  PHARYNGEAL PHASE:  Normal for age. ASPIRATION:  None. PENETRATION:  Transient penetration with thin barium by cup. OTHER:  Mild residual vallecular barium with the thicker consistencies.  PLEASE SEE SPEECH PATHOLOGIST REPORT FOR FULL ASSESSMENT OF SWALLOWING MECHANISM.   LOCATION:  [facility]    Dictated by (CST): Lamar Mendes MD on 2/03/2025 at 2:06 PM      Finalized by (CST): Lamar Mendes MD on 2/03/2025 at 2:06 PM       CT CHEST (CONTRAST ONLY) ABDOMEN (W+WO) PELVIS (W+) (CPT=71260/05598)    Result Date: 2/2/2025  CONCLUSION:   1. No evidence of pulmonary embolus.  2. Interlobular septal thickening along with pleural effusions is suggestive of fluid overload.  3. Ground-glass consolidations in the left lung are noted.  This may be due to infectious process.  However, this could also be due to pulmonary edema.  4. Cholelithiasis is incidentally noted.  An acute inflammatory process in the abdomen and pelvis is not identified.     LOCATION:  [place] [id number]   Dictated by (CST): Donato Maldonado MD on 2/02/2025 at 1:59 PM     Finalized by (CST): Donato Maldonado MD on 2/02/2025 at 2:06 PM       XR CHEST AP PORTABLE  (CPT=71045)    Result Date: 2/2/2025  CONCLUSION:  Findings are suggestive of increased fluid overload.   LOCATION:  [place] [address]      Dictated by (CST): Donato Maldonado MD on 2/02/2025 at 12:11 PM     Finalized by (CST): Donato Maldonado MD on 2/02/2025 at 12:11 PM        ASSESSMENT  Acute hypoxic respiratory failure due to pneumonia, pulmonary edema  Pneumonia, possible aspiration vs nosocomial  Influenza infection  Sepsis due to E coli bacteremia, UTI  Afib with RVR  Leukocytosis related to above  Shock related to sepsis, now resolved  CAD/CABG, HTN, HLD  Hx CVA    PLAN  Continue close monitoring of respiratory status, wean o2 for sats >89%  O2 walk   Continue abx -changed to meropenem per ID  Completed tamiflu  Continue scheduled nebs  Mobilize as able, use IS  Cardiology following/managing afib  Discharge planning    Osmany Bang MD

## 2025-02-14 NOTE — PLAN OF CARE
Assumed care of patient at 0730.  Alert and oriented x4.  On room air with adequate O2 saturations.   Atrial paced/afib on tele.  Incontinent, primofit in place.  Up with 1 and walker. Pt prefers to sit in chair all of day.   No pain.  Updated on plan of care, verbalized understanding.     Addendum:  1400: Pt's sister Moon called to give her an update that patient will be going to rehab at 1630. Verbalized understanding.     1620: Report called to Regina COOK at [facility].       Problem: Patient/Family Goals  Goal: Patient/Family Long Term Goal  Description: Patient's Long Term Goal: dc to [facility]  Stay out of  hospital 2-11    Interventions:  - pt/ot  - pain control  - off oxygen  - med compliance, follow ups    - See additional Care Plan goals for specific interventions  Outcome: Progressing  Goal: Patient/Family Short Term Goal  Description: Patient's Short Term Goal: pain control  Walk in hallway 2-11    Interventions:   - pharm and nonpharm interventions  - walk with PT/RN/PCT, sit up in chair    - See additional Care Plan goals for specific interventions  Outcome: Progressing     Problem: CARDIOVASCULAR - ADULT  Goal: Maintains optimal cardiac output and hemodynamic stability  Description: INTERVENTIONS:  - Monitor vital signs, rhythm, and trends  - Monitor for bleeding, hypotension and signs of decreased cardiac output  - Evaluate effectiveness of vasoactive medications to optimize hemodynamic stability  - Monitor arterial and/or venous puncture sites for bleeding and/or hematoma  - Assess quality of pulses, skin color and temperature  - Assess for signs of decreased coronary artery perfusion - ex. Angina  - Evaluate fluid balance, assess for edema, trend weights  Outcome: Progressing     Problem: RESPIRATORY - ADULT  Goal: Achieves optimal ventilation and oxygenation  Description: INTERVENTIONS:  - Assess for changes in respiratory status  - Assess for changes in mentation and behavior  - Position to  facilitate oxygenation and minimize respiratory effort  - Oxygen supplementation based on oxygen saturation or ABGs  - Provide Smoking Cessation handout, if applicable  - Encourage broncho-pulmonary hygiene including cough, deep breathe, Incentive Spirometry  - Assess the need for suctioning and perform as needed  - Assess and instruct to report SOB or any respiratory difficulty  - Respiratory Therapy support as indicated  - Manage/alleviate anxiety  - Monitor for signs/symptoms of CO2 retention  Outcome: Progressing

## 2025-02-14 NOTE — CM/SW NOTE
SW sent updates to [facility]  via [employer]. Confirmed that they were able to have the auth switched to that facility. Awaiting ID clearance to discharge. Auth expires today to admit.     Betty GARZA, LSW  Discharge Planner

## 2025-02-14 NOTE — PROGRESS NOTES
INFECTIOUS DISEASE  PROGRESS NOTE            [facility]    Jose Alfredo Solis Patient Status:  Inpatient    1940 MRN [id number]   [facility] 4SW-A Attending Henna Edge MD   Hosp Day # 14 PCP MIKAYLA BAUGH, MD [facility]     Antimicrobials: #14  Meropenem D#1  S/p Tamiflu x 5 days    Subjective: Patient seen and examined today. Overall continues to feel better. Less cough, no sputum production. On room air. Afebrile.     Objective:  Temp:  [97.8 °F (36.6 °C)-99.1 °F (37.3 °C)] 98 °F (36.7 °C)  Pulse:  [] 72  Resp:  [17-20] 18  BP: ()/(53-70) 99/59  SpO2:  [70 %-97 %] 96 %    General: awake alert, on room air. Sitting up in a chair.   Vital signs:Temp:  [97.8 °F (36.6 °C)-99.1 °F (37.3 °C)] 98 °F (36.7 °C)  Pulse:  [] 72  Resp:  [17-20] 18  BP: ()/(53-70) 99/59  SpO2:  [70 %-97 %] 96 %  HEENT: Moist mucous membranes. Extraocular muscles are intact.  Neck: supple, no masses  Respiratory: Clear to auscultation bilaterally. No wheezing.   Abdomen: Soft, nontender, nondistended.   Musculoskeletal: Movement of extremities; joints: no swelling   Integument: Multiple bandages in place with skin tears noted    Labs:     COVID-19 Lab Results    COVID-19  Lab Results   Component Value Date    COVID19 Not Detected 2025    COVID19 Not Detected 2025       Pro-Calcitonin  No results for input(s): \"PCT\" in the last 168 hours.      Cardiac  No results for input(s): \"TROP\", \"PBNP\" in the last 168 hours.      Creatinine Kinase  No results for input(s): \"CK\" in the last 168 hours.    Inflammatory Markers  No results for input(s): \"CRP\", \"ARIK\", \"LDH\", \"DDIMER\" in the last 168 hours.    Recent Labs   Lab 25  0542 25  0546 25  0510   RBC 3.36*   < > 3.30*   HGB 10.3*   < > 9.9*   HCT 29.8*   < > 29.6*   MCV 88.7   < > 89.7   MCH 30.7   < > 30.0   MCHC 34.6   < > 33.4   RDW 14.6   < > 14.6   NEPRELIM 13.08*   < > 11.45*   WBC 18.1*   < > 18.6*   PLT  222.0   < > 534.0*   NEUT 73   < > 72   LYMPH 8   < > 10   MON 9   < > 16   EOS 0   < > 0   NRBC 1*  --   --     < > = values in this interval not displayed.         Recent Labs   Lab 02/08/25  0542 02/09/25  0546 02/10/25  0454 02/12/25  0549   * 117* 158* 125*   BUN 22 13 13 15   CREATSERUM 0.78 0.65* 0.63* 0.63*   CA 8.3* 8.6* 8.6* 9.4   ALB 3.2  --   --   --    * 134* 133* 135*   K 3.4*  3.4* 3.9  3.9 3.8 3.9   CL 96* 98 99 99   CO2 29.0 25.0 27.0 27.0   ALKPHO 47  --   --   --    AST 65*  --   --   --    ALT 11  --   --   --    BILT 1.4*  --   --   --    TP 5.6*  --   --   --        No results found for: \"VANCT\"  Microbiology    Hospital Encounter on 01/31/25   1. Blood Culture     Status: None    Collection Time: 02/07/25  7:12 PM    Specimen: Blood,peripheral   Result Value Ref Range    Blood Culture Result No Growth 5 Days N/A   2. Urine Culture, Routine     Status: Abnormal    Collection Time: 01/31/25  1:39 AM    Specimen: Urine, clean catch   Result Value Ref Range    Urine Culture >100,000 CFU/ML Escherichia coli (A) N/A       Susceptibility    Escherichia coli -  (no method available)     Ampicillin 4 Sensitive      Cefazolin <=4 Sensitive      Ciprofloxacin <=0.25 Sensitive      Gentamicin <=1 Sensitive      Meropenem <=0.25 Sensitive      Levofloxacin <=0.12 Sensitive      Nitrofurantoin <=16 Sensitive      Piperacillin + Tazobactam <=4 Sensitive      Trimethoprim/Sulfa <=20 Sensitive        Problem list reviewed:  Patient Active Problem List   Diagnosis    Trochanteric bursitis of left hip    Disorientation    Sepsis due to undetermined organism (HCC)    Sepsis with hypotension (HCC)    Hyponatremia    Thrombocytopenia    Metabolic acidosis    Pyelonephritis    Pneumonia of left upper lobe due to infectious organism    Acute hypoxic respiratory failure (HCC)       Imaging: Reviewed.       ASSESSMENT/PLAN:  Influenza A with superimposed bacterial pneumonia. Sputum culture with  enterobacter and candida albicans- doubt candida is of clinical significance. Completed course of Tamiflu, on abx for pna.   E. coli UTI and bacteremia. Bcx 2/2 + 1/31, repeat bcxs 2/1 negative. CT no stone or hydro.  Acute respiratory failure with hypoxia, d/t #1 in addition to pulmonary edema. Resolved.  Leukocytosis. WBC elevated earlier this admission d/t #2, resolved. Now again with leukocytosis, assume d/t #1. Some improvement today.   Cardiomyopathy, CHF, CAD SP CABG/Afib  Gallstones    PLAN:  -continue meropenem started today for E. Coli UTI/bacteremia and Enterobacter pna.   -follow respiratory status, pulm following  -follow WBC and temps  -follow new blood cultures from 2/13  -await final sputum culture to determine final abx recs    Discussed case with patient and patient's sister.     Angelic Stevens PA-C

## 2025-02-14 NOTE — PROGRESS NOTES
INFECTIOUS DISEASE  PROGRESS NOTE            [facility]    Jose Alfredo Solis Patient Status:  Inpatient    1940 MRN [id number]   [facility] 4SW-A Attending Henna Edge MD   Hosp Day # 14 PCP MIKAYLA BAUGH, MD ROSEANN     Antibiotics: #14  Meropenem #1    Subjective:  Feeling well, no sob no fever    Objective:  Temp:  [98 °F (36.7 °C)-99.1 °F (37.3 °C)] 98.5 °F (36.9 °C)  Pulse:  [] 73  Resp:  [17-19] 17  BP: ()/(53-70) 129/66  SpO2:  [70 %-97 %] 97 %    General: awake alert  Vital signs:Temp:  [98 °F (36.7 °C)-99.1 °F (37.3 °C)] 98.5 °F (36.9 °C)  Pulse:  [] 73  Resp:  [17-] 17  BP: ()/(53-70) 129/66  SpO2:  [70 %-97 %] 97 %  HEENT: Moist mucous membranes. Extraocular muscles are intact.  Neck: supple no masses  Respiratory: Non labored, symmetric excursion, normal respirations  Abdomen: Soft, nontender, nondistended.   Musculoskeletal: joints: no swelling   Integument: No lesions. No erythema. No open wounds.  Labs:     COVID-19 Lab Results    COVID-19  Lab Results   Component Value Date    COVID19 Not Detected 2025    COVID19 Not Detected 2025       Pro-Calcitonin  No results for input(s): \"PCT\" in the last 168 hours.      Cardiac  No results for input(s): \"TROP\", \"PBNP\" in the last 168 hours.      Creatinine Kinase  No results for input(s): \"CK\" in the last 168 hours.    Inflammatory Markers  No results for input(s): \"CRP\", \"ARIK\", \"LDH\", \"DDIMER\" in the last 168 hours.    Recent Labs   Lab 25  0542 25  0546 25  0510   RBC 3.36*   < > 3.30*   HGB 10.3*   < > 9.9*   HCT 29.8*   < > 29.6*   MCV 88.7   < > 89.7   MCH 30.7   < > 30.0   MCHC 34.6   < > 33.4   RDW 14.6   < > 14.6   NEPRELIM 13.08*   < > 11.45*   WBC 18.1*   < > 18.6*   .0   < > 534.0*   NEUT 73   < > 72   LYMPH 8   < > 10   MON 9   < > 16   EOS 0   < > 0   NRBC 1*  --   --     < > = values in this interval not displayed.         Recent Labs   Lab  02/08/25  0542 02/09/25  0546 02/10/25  0454 02/12/25  0549   * 117* 158* 125*   BUN 22 13 13 15   CREATSERUM 0.78 0.65* 0.63* 0.63*   CA 8.3* 8.6* 8.6* 9.4   ALB 3.2  --   --   --    * 134* 133* 135*   K 3.4*  3.4* 3.9  3.9 3.8 3.9   CL 96* 98 99 99   CO2 29.0 25.0 27.0 27.0   ALKPHO 47  --   --   --    AST 65*  --   --   --    ALT 11  --   --   --    BILT 1.4*  --   --   --    TP 5.6*  --   --   --        No results found for: \"VANCT\"  Microbiology    Hospital Encounter on 01/31/25   1. Blood Culture     Status: None    Collection Time: 02/07/25  7:12 PM    Specimen: Blood,peripheral   Result Value Ref Range    Blood Culture Result No Growth 5 Days N/A   2. Urine Culture, Routine     Status: Abnormal    Collection Time: 01/31/25  1:39 AM    Specimen: Urine, clean catch   Result Value Ref Range    Urine Culture >100,000 CFU/ML Escherichia coli (A) N/A       Susceptibility    Escherichia coli -  (no method available)     Ampicillin 4 Sensitive      Cefazolin <=4 Sensitive      Ciprofloxacin <=0.25 Sensitive      Gentamicin <=1 Sensitive      Meropenem <=0.25 Sensitive      Levofloxacin <=0.12 Sensitive      Nitrofurantoin <=16 Sensitive      Piperacillin + Tazobactam <=4 Sensitive      Trimethoprim/Sulfa <=20 Sensitive        Problem list reviewed:  Patient Active Problem List   Diagnosis    Trochanteric bursitis of left hip    Disorientation    Sepsis due to undetermined organism (HCC)    Sepsis with hypotension (HCC)    Hyponatremia    Thrombocytopenia    Metabolic acidosis    Pyelonephritis    Pneumonia of left upper lobe due to infectious organism    Acute hypoxic respiratory failure (HCC)   Radiology    CXR 2/10    Evaluation process:    Evaluation involves complex antimicrobial therapy, counseling, and treatment such as decisions to institute, withdrawal, or adjust antibiotic therapy, and or educational discussions with patient, family, and collaborating treatment  team          ASSESSMENT/PLAN:  1. Influenza infection  Concerns for developing pneumonia vs edema  Elevated WBC  Clinically improved no fever, and resp symptoms improved    Sputum with Enterobacter more cw sputum colonization    2. E coli UTI and bacteremia  -reported slow stream in the past  Currently voiding with external cath  Has been voiding without signifciant residual  CT no stone or hydro      2. Cardiomyopathy, CHF      3. Gallstones    4. CAD SP CABG/Afib      Can hold off on further meropenem dosing as he has improved    Ahsan Norton MD, MD [facility]  [phone]

## 2025-02-14 NOTE — DISCHARGE SUMMARY
[place] [facility]  DISCHARGE SUMMARY     Jose Alfredo Solis Patient Status:  Inpatient    1940 MRN [id number]   Location [facility] 8NE-A Attending Landy Keen, DO   Hosp Day # 14 PCP MIKAYLA BAUGH, MD ROSEANN     Date of Admission: 2025  Date of Discharge:   2025    Discharge Disposition: Home or Self Care    Discharge Diagnosis:  #Septic shock due to Ecoli UTI and bacteremia- shock resolved, off pressors  -Repeat blood Cx  NGTD   -Keflex      #Acute hypoxic respiratory failure  PNA  -wean O2 as able  -Antibiotics per ID  -airway clearance   -Pulmonology on consult      #Influenza A  -Tamiflu      #paroxysmal Afib  -BB, Amiodarone   -DOAC     #CAD s/p CABG  #Hypothyroidism - Levothyroxine   #HLD  #BPH - flomax    Chief Complaint: weakness     History of Present Illness: Jose Alfredo Solis is a 85 year old male with PMHx CAD s/p CABG, Afib, HTN, HLD, Hypothyroidism, who presents for weakness.      Patient brought in by ambulance for confusion, change in mentation.  Patient states that he had a fall after he was walking with his walking rollator, was evaluated at [facility] and cleared and discharged back to home.  However sister noted that he was not talking normally.  Initially brought in as a code stroke, CT imaging without intracranial hemorrhage, large vessel occlusion.  On initial evaluation in the ER noted to be quite hypotensive, labs consistent with UTI and admitted to ICU with pressors.  Evaluated patient in the ICU and they will patient much more responsive, back to baseline mentation ANO x 3, answering questions appropriately.  Does note that he has been having dysuria for the past week.  Denies any other fevers, chills, chest pain, shortness of breath, abdominal pain    Brief Synopsis:     : Fall at home, garbled speech-->code stroke-->CT Head(-), CXR (-), LA 2.5-> BP 70-80s -- Levo-> ICU.   : -180,  - Toprol PO started.   : Blood cx & Urine cx (+)  E.coli -- ABX. SLP: minced/chopped/nectar thick. Fever 102, tachy, AMS: precedex on/off. CT Abd.Pelvis: Alex effusions, (-) PE. CXR: Fluid overload -- Lasix   2/3: Video swallow: Ok for reg solids /thins.   2/5: CXR: Pulm edema, lasix once. Echo EF 55-60%   2/7: Ancef completed x7 days. Repeat BC. (+) Influenza. Afib RVR transfer CTU 8--> Cardizem gtt, 1000 ml Bolus   2/8: CXR: Worsening PNA, worsening consolidation   2/9: Lasix held. CXR: Stable left pleural effusion, PNA, pulm edema   2/10: Zosyn completed x3 days. CXR: diffuse interstitial opacities   2/11: IV lasix once   2/12: Sputum sent. BC (-)x4 days, Start PO Lasix. Completed tamiflu   2/13: wbc count increased. Started merrem abx   2/14: Discharged to [facility]     Lace+ Score: 64  59-90 High Risk  29-58 Medium Risk  0-28   Low Risk       TCM Follow-Up Recommendation:  LACE > 58: High Risk of readmission after discharge from the hospital.      Procedures during hospitalization:   N/a    Incidental or significant findings and recommendations (brief descriptions):  N/a    Lab/Test results pending at Discharge:   N/a    Consultants:  Cardiology, Pulmonology, ID, Critical care      Discharge Medication List:     Discharge Medications        START taking these medications        Instructions Prescription details   cephALEXin 500 MG Caps  Commonly known as: Keflex      Take 1 capsule (500 mg total) by mouth 3 (three) times daily for 10 days.   Stop taking on: February 17, 2025  Quantity: 30 capsule  Refills: 0     dilTIAZem 30 MG Tabs  Commonly known as: cardIZEM      Take 1 tablet (30 mg total) by mouth every 12 (twelve) hours.   Quantity: 60 tablet  Refills: 2     fluticasone furoate-vilanterol 100-25 MCG/ACT Aepb  Commonly known as: Breo Ellipta      Inhale 1 puff into the lungs daily.   Quantity: 1 each  Refills: 1     HYDROcodone-homatropine 5-1.5 MG/5ML Soln  Commonly known as: Hydromet      Take 5 mL by mouth every 8 (eight) hours as needed.   Quantity: 120  mL  Refills: 0            CHANGE how you take these medications        Instructions Prescription details   metoprolol succinate ER 50 MG Tb24  Commonly known as: Toprol XL  What changed:   medication strength  how much to take      Take 1 tablet (50 mg total) by mouth 2x Daily(Beta Blocker).   Quantity: 60 tablet  Refills: 0            CONTINUE taking these medications        Instructions Prescription details   amiodarone 200 MG Tabs  Commonly known as: Pacerone      Take 1 tablet (200 mg total) by mouth daily.   Refills: 0     apixaban 5 MG Tabs  Commonly known as: Eliquis      Take 5 mg by mouth 2 (two) times daily.   Refills: 0     aspirin 81 MG Tbec      Take 1 tablet (81 mg total) by mouth daily.   Refills: 0     atorvastatin 20 MG Tabs  Commonly known as: Lipitor      Take 1 tablet (20 mg total) by mouth nightly.   Refills: 0     EYE VITAMINS OR      Take by mouth.   Refills: 0     ferrous sulfate 325 (65 FE) MG Tbec      Take 1 tablet (325 mg total) by mouth daily with breakfast.   Refills: 0     furosemide 20 MG Tabs  Commonly known as: Lasix      Take 1 tablet (20 mg total) by mouth daily.   Refills: 0     pantoprazole 40 MG Tbec  Commonly known as: Protonix      Take 1 tablet (40 mg total) by mouth every morning before breakfast.   Refills: 0     tamsulosin 0.4 MG Caps  Commonly known as: Flomax      Take 1 capsule (0.4 mg total) by mouth daily.   Refills: 0     Vitamin D3 250 MCG (60439 UT) Caps      Take 10,000 Units by mouth See Admin Instructions. Twice a week   Refills: 0            STOP taking these medications      enalapril 10 MG Tabs  Commonly known as: Vasotec                  Where to Get Your Medications        These medications were sent to [id number] - [place], [place] - [address] [phone], [phone]  [facility] [facility] [address]      Phone: [phone]   cephALEXin 500 MG Caps  dilTIAZem 30 MG Tabs  fluticasone furoate-vilanterol 100-25 MCG/ACT Aepb  metoprolol  succinate ER 50 MG Tb24       Please  your prescriptions at the location directed by your doctor or nurse    Bring a paper prescription for each of these medications  HYDROcodone-homatropine 5-1.5 MG/5ML Soln         [place]    Follow-up appointment:   Miguel Mills, MD Lona  [address]  [address]  [facility] [address]  [phone]    Follow up in 2 week(s)      Osmany Bang MD  [address]  [facility] [address]  [phone]    Schedule an appointment as soon as possible for a visit in 2 week(s)      Rosa Hodges MD  [address]  [address]  [place] [address]  [phone]    Schedule an appointment as soon as possible for a visit in 2 week(s)      Rob Garcia MD  [address]  [address]  [facility] [address]  [phone]    Schedule an appointment as soon as possible for a visit in 1 week(s)      Appointments for Next 30 Days 2025 - 3/16/2025      None            Vital signs:  Temp:  [98 °F (36.7 °C)-99.1 °F (37.3 °C)] 98.5 °F (36.9 °C)  Pulse:  [] 73  Resp:  [17-19] 17  BP: ()/(53-70) 129/66  SpO2:  [70 %-97 %] 97 %    Physical Exam:    General: No acute distress   Lungs: clear to auscultation  Cardiovascular: S1, S2  Abdomen: Soft      -----------------------------------------------------------------------------------------------  PATIENT DISCHARGE INSTRUCTIONS: See electronic chart    Landy Keen DO    Total time spent on discharge plannin minutes     The  [employer] Cures Act makes medical notes like these available to patients in the interest of transparency. Please be advised this is a medical document. Medical documents are intended to carry relevant information, facts as evident, and the clinical opinion of the practitioner. The medical note is intended as peer to peer communication and may appear blunt or direct. It is written in medical language and may contain abbreviations or verbiage that are unfamiliar.

## 2025-02-14 NOTE — PLAN OF CARE
A&Ox4, forgetful at times. Pt on droplet precautions for influenza. Room air. Continuous pulse ox maintained. Afib on telemetry. Pt denies chest pain or shortness of breath. Primofit in place. Prn miralax administered for constipation. No complaints of pain. Patient sitting comfortably in chair. Fall precautions in place. Discussed plan of care with patient. Patient verbalizes understanding.    Plan of care: IV abx    Problem: Patient/Family Goals  Goal: Patient/Family Long Term Goal  Description: Patient's Long Term Goal: dc to [facility]  Stay out of  hospital 2-11    Interventions:  - pt/ot  - pain control  - off oxygen  - med compliance, follow ups    - See additional Care Plan goals for specific interventions  2/14/2025 0218 by Va Painter, RN  Outcome: Progressing  Goal: Patient/Family Short Term Goal  Description: Patient's Short Term Goal: pain control  Walk in hallway 2-11    Interventions:   - pharm and nonpharm interventions  - walk with PT/RN/PCT, sit up in chair    - See additional Care Plan goals for specific interventions  2/14/2025 0218 by Va Painter, RN  Outcome: Progressing     Problem: CARDIOVASCULAR - ADULT  Goal: Maintains optimal cardiac output and hemodynamic stability  Description: INTERVENTIONS:  - Monitor vital signs, rhythm, and trends  - Monitor for bleeding, hypotension and signs of decreased cardiac output  - Evaluate effectiveness of vasoactive medications to optimize hemodynamic stability  - Monitor arterial and/or venous puncture sites for bleeding and/or hematoma  - Assess quality of pulses, skin color and temperature  - Assess for signs of decreased coronary artery perfusion - ex. Angina  - Evaluate fluid balance, assess for edema, trend weights  2/14/2025 0218 by Va Painter, RN  Outcome: Progressing     Problem: RESPIRATORY - ADULT  Goal: Achieves optimal ventilation and oxygenation  Description: INTERVENTIONS:  - Assess for changes in respiratory status  -  Assess for changes in mentation and behavior  - Position to facilitate oxygenation and minimize respiratory effort  - Oxygen supplementation based on oxygen saturation or ABGs  - Provide Smoking Cessation handout, if applicable  - Encourage broncho-pulmonary hygiene including cough, deep breathe, Incentive Spirometry  - Assess the need for suctioning and perform as needed  - Assess and instruct to report SOB or any respiratory difficulty  - Respiratory Therapy support as indicated  - Manage/alleviate anxiety  - Monitor for signs/symptoms of CO2 retention  2/14/2025 0218 by Va Painter, RN  Outcome: Progressing     Problem: RISK FOR INFECTION - ADULT  Goal: Absence of fever/infection during anticipated neutropenic period  Description: INTERVENTIONS  - Monitor WBC  - Administer growth factors as ordered  - Implement neutropenic guidelines  2/14/2025 0218 by Va Painter, RN  Outcome: Progressing

## 2025-02-17 NOTE — PAYOR COMM NOTE
--------------  DISCHARGE REVIEW    Payor: [employer]  Subscriber #:  [id number]  Authorization Number: [id number]    Admit date: 25  Admit time:   4:45 AM  Discharge Date: 2025  5:40 PM     Admitting Physician: Payam Bailey MD  Attending Physician:  No att. providers found  Primary Care Physician: Miguel Baugh, MD Lona          Discharge Summary Notes        Discharge Summary signed by Landy Keen DO at 2025  3:15 PM       Author: Landy Keen DO Specialty: HOSPITALIST, Internal Medicine Author Type: Physician    Filed: 2025  3:15 PM Date of Service: 2025  3:10 PM Status: Signed    : Landy Keen DO (Physician)           [facility]     Jose Alfredo Solis Patient Status:  Inpatient    1940 MRN [id number]   Location [facility] 8NE-A Attending Landy Keen DO   Hosp Day # 14 PCP MIGUEL BAUGH, MD LONA     Date of Admission: 2025  Date of Discharge:   2025    Discharge Disposition: Home or Self Care    Discharge Diagnosis:  #Septic shock due to Ecoli UTI and bacteremia- shock resolved, off pressors  -Repeat blood Cx  NGTD   -Keflex      #Acute hypoxic respiratory failure  PNA  -wean O2 as able  -Antibiotics per ID  -airway clearance   -Pulmonology on consult      #Influenza A  -Tamiflu      #paroxysmal Afib  -BB, Amiodarone   -DOAC     #CAD s/p CABG  #Hypothyroidism - Levothyroxine   #HLD  #BPH - flomax    Chief Complaint: weakness     History of Present Illness: Jose Alfredo Solis is a 85 year old male with PMHx CAD s/p CABG, Afib, HTN, HLD, Hypothyroidism, who presents for weakness.      Patient brought in by ambulance for confusion, change in mentation.  Patient states that he had a fall after he was walking with his walking rollator, was evaluated at [facility] and cleared and discharged back to home.  However sister noted that he was not talking normally.  Initially brought in as a code  stroke, CT imaging without intracranial hemorrhage, large vessel occlusion.  On initial evaluation in the ER noted to be quite hypotensive, labs consistent with UTI and admitted to ICU with pressors.  Evaluated patient in the ICU and they will patient much more responsive, back to baseline mentation ANO x 3, answering questions appropriately.  Does note that he has been having dysuria for the past week.  Denies any other fevers, chills, chest pain, shortness of breath, abdominal pain    Brief Synopsis:     1/31: Fall at home, garbled speech-->code stroke-->CT Head(-), CXR (-), LA 2.5-> BP 70-80s -- Levo-> ICU.   2/1: -180,  - Toprol PO started.   2/2: Blood cx & Urine cx (+) E.coli -- ABX. SLP: minced/chopped/nectar thick. Fever 102, tachy, AMS: precedex on/off. CT Abd.Pelvis: Alex effusions, (-) PE. CXR: Fluid overload -- Lasix   2/3: Video swallow: Ok for reg solids /thins.   2/5: CXR: Pulm edema, lasix once. Echo EF 55-60%   2/7: Ancef completed x7 days. Repeat BC. (+) Influenza. Afib RVR transfer CTU 8--> Cardizem gtt, 1000 ml Bolus   2/8: CXR: Worsening PNA, worsening consolidation   2/9: Lasix held. CXR: Stable left pleural effusion, PNA, pulm edema   2/10: Zosyn completed x3 days. CXR: diffuse interstitial opacities   2/11: IV lasix once   2/12: Sputum sent. BC (-)x4 days, Start PO Lasix. Completed tamiflu   2/13: wbc count increased. Started merrem abx   2/14: Discharged to [facility]     Lace+ Score: 64  59-90 High Risk  29-58 Medium Risk  0-28   Low Risk       TCM Follow-Up Recommendation:  LACE > 58: High Risk of readmission after discharge from the hospital.      Procedures during hospitalization:   N/a    Incidental or significant findings and recommendations (brief descriptions):  N/a    Lab/Test results pending at Discharge:   N/a    Consultants:  Cardiology, Pulmonology, ID, Critical care      Discharge Medication List:     Discharge Medications        START taking these medications         Instructions Prescription details   cephALEXin 500 MG Caps  Commonly known as: Keflex      Take 1 capsule (500 mg total) by mouth 3 (three) times daily for 10 days.   Stop taking on: February 17, 2025  Quantity: 30 capsule  Refills: 0     dilTIAZem 30 MG Tabs  Commonly known as: cardIZEM      Take 1 tablet (30 mg total) by mouth every 12 (twelve) hours.   Quantity: 60 tablet  Refills: 2     fluticasone furoate-vilanterol 100-25 MCG/ACT Aepb  Commonly known as: Breo Ellipta      Inhale 1 puff into the lungs daily.   Quantity: 1 each  Refills: 1     HYDROcodone-homatropine 5-1.5 MG/5ML Soln  Commonly known as: Hydromet      Take 5 mL by mouth every 8 (eight) hours as needed.   Quantity: 120 mL  Refills: 0            CHANGE how you take these medications        Instructions Prescription details   metoprolol succinate ER 50 MG Tb24  Commonly known as: Toprol XL  What changed:   medication strength  how much to take      Take 1 tablet (50 mg total) by mouth 2x Daily(Beta Blocker).   Quantity: 60 tablet  Refills: 0            CONTINUE taking these medications        Instructions Prescription details   amiodarone 200 MG Tabs  Commonly known as: Pacerone      Take 1 tablet (200 mg total) by mouth daily.   Refills: 0     apixaban 5 MG Tabs  Commonly known as: Eliquis      Take 5 mg by mouth 2 (two) times daily.   Refills: 0     aspirin 81 MG Tbec      Take 1 tablet (81 mg total) by mouth daily.   Refills: 0     atorvastatin 20 MG Tabs  Commonly known as: Lipitor      Take 1 tablet (20 mg total) by mouth nightly.   Refills: 0     EYE VITAMINS OR      Take by mouth.   Refills: 0     ferrous sulfate 325 (65 FE) MG Tbec      Take 1 tablet (325 mg total) by mouth daily with breakfast.   Refills: 0     furosemide 20 MG Tabs  Commonly known as: Lasix      Take 1 tablet (20 mg total) by mouth daily.   Refills: 0     pantoprazole 40 MG Tbec  Commonly known as: Protonix      Take 1 tablet (40 mg total) by mouth every morning before  breakfast.   Refills: 0     tamsulosin 0.4 MG Caps  Commonly known as: Flomax      Take 1 capsule (0.4 mg total) by mouth daily.   Refills: 0     Vitamin D3 250 MCG (19306 UT) Caps      Take 10,000 Units by mouth See Admin Instructions. Twice a week   Refills: 0            STOP taking these medications      enalapril 10 MG Tabs  Commonly known as: Vasotec                  Where to Get Your Medications        These medications were sent to [id number] - [place], [place] - [address] [phone], [phone]  [address] [address]      Phone: [phone]   cephALEXin 500 MG Caps  dilTIAZem 30 MG Tabs  fluticasone furoate-vilanterol 100-25 MCG/ACT Aepb  metoprolol succinate ER 50 MG Tb24       Please  your prescriptions at the location directed by your doctor or nurse    Bring a paper prescription for each of these medications  HYDROcodone-homatropine 5-1.5 MG/5ML Soln         [place]    Follow-up appointment:   Miguel Mills, MD Lona  [address]  [address]  [facility] [address]  [phone]    Follow up in 2 week(s)      Osmany Bang MD  [address]  [facility] [address]  [phone]    Schedule an appointment as soon as possible for a visit in 2 week(s)      Rosa Hodges MD  [address]  [address]  [place] [id number] [phone]    Schedule an appointment as soon as possible for a visit in 2 week(s)      Rob Garcia MD  [address]  [address]  [facility] [address]  [phone]    Schedule an appointment as soon as possible for a visit in 1 week(s)      Appointments for Next 30 Days 2/14/2025 - 3/16/2025      None            Vital signs:  Temp:  [98 °F (36.7 °C)-99.1 °F (37.3 °C)] 98.5 °F (36.9 °C)  Pulse:  [] 73  Resp:  [17-19] 17  BP: ()/(53-70) 129/66  SpO2:  [70 %-97 %] 97 %    Physical Exam:    General: No acute distress   Lungs: clear to auscultation  Cardiovascular: S1, S2  Abdomen:  Soft      -----------------------------------------------------------------------------------------------  PATIENT DISCHARGE INSTRUCTIONS: See electronic chart    Landy Keen DO    Total time spent on discharge plannin minutes     The  [employer] Cures Act makes medical notes like these available to patients in the interest of transparency. Please be advised this is a medical document. Medical documents are intended to carry relevant information, facts as evident, and the clinical opinion of the practitioner. The medical note is intended as peer to peer communication and may appear blunt or direct. It is written in medical language and may contain abbreviations or verbiage that are unfamiliar.       Electronically signed by Landy Keen DO on 2025  3:15 PM         REVIEWER COMMENTS

## 2025-02-26 NOTE — PROGRESS NOTES
Provider Clarification     Additional clarification of the patient's encephalopathy that is documented is it valid      Encephalopathy is ruled out               This note is part of the patient's medical record.

## 2025-02-28 ENCOUNTER — OFFICE VISIT (OUTPATIENT)
Facility: CLINIC | Age: 85
End: 2025-02-28
Payer: COMMERCIAL

## 2025-02-28 VITALS
RESPIRATION RATE: 16 BRPM | DIASTOLIC BLOOD PRESSURE: 56 MMHG | WEIGHT: 159 LBS | HEART RATE: 71 BPM | BODY MASS INDEX: 24.1 KG/M2 | HEIGHT: 68 IN | OXYGEN SATURATION: 98 % | SYSTOLIC BLOOD PRESSURE: 122 MMHG

## 2025-02-28 DIAGNOSIS — J96.01 ACUTE HYPOXIC RESPIRATORY FAILURE (HCC): Primary | ICD-10-CM

## 2025-02-28 DIAGNOSIS — J18.9 PNEUMONIA OF LEFT UPPER LOBE DUE TO INFECTIOUS ORGANISM: ICD-10-CM

## 2025-02-28 DIAGNOSIS — J10.1 INFLUENZA A: ICD-10-CM

## 2025-02-28 PROCEDURE — 3074F SYST BP LT 130 MM HG: CPT

## 2025-02-28 PROCEDURE — 3078F DIAST BP <80 MM HG: CPT

## 2025-02-28 PROCEDURE — 1159F MED LIST DOCD IN RCRD: CPT

## 2025-02-28 PROCEDURE — 1111F DSCHRG MED/CURRENT MED MERGE: CPT

## 2025-02-28 PROCEDURE — 1160F RVW MEDS BY RX/DR IN RCRD: CPT

## 2025-02-28 PROCEDURE — 3008F BODY MASS INDEX DOCD: CPT

## 2025-02-28 PROCEDURE — 99213 OFFICE O/P EST LOW 20 MIN: CPT

## 2025-02-28 RX ORDER — ALBUTEROL SULFATE 0.83 MG/ML
3 SOLUTION RESPIRATORY (INHALATION) EVERY 6 HOURS PRN
COMMUNITY
Start: 2025-02-26 | End: 2025-03-28

## 2025-02-28 RX ORDER — FLUTICASONE PROPIONATE 50 MCG
2 SPRAY, SUSPENSION (ML) NASAL DAILY
COMMUNITY
Start: 2025-02-26

## 2025-02-28 RX ORDER — FLUTICASONE FUROATE AND VILANTEROL 100; 25 UG/1; UG/1
1 POWDER RESPIRATORY (INHALATION) DAILY
Qty: 1 EACH | Refills: 1 | Status: SHIPPED | OUTPATIENT
Start: 2025-02-28

## 2025-02-28 RX ORDER — AMPICILLIN TRIHYDRATE 250 MG
100 CAPSULE ORAL 2 TIMES DAILY
COMMUNITY

## 2025-02-28 RX ORDER — PSEUDOEPHEDRINE HCL 30 MG
250 TABLET ORAL DAILY
COMMUNITY

## 2025-02-28 RX ORDER — SODIUM CHLORIDE 1 G/1
1000 TABLET ORAL DAILY
COMMUNITY
Start: 2025-02-26 | End: 2025-03-05

## 2025-02-28 RX ORDER — GAUZE BANDAGE 4" X 4"
BANDAGE TOPICAL AS DIRECTED
COMMUNITY

## 2025-02-28 RX ORDER — FLUTICASONE PROPIONATE AND SALMETEROL 100; 50 UG/1; UG/1
1 POWDER RESPIRATORY (INHALATION) 2 TIMES DAILY
COMMUNITY
Start: 2025-02-26 | End: 2025-02-28

## 2025-02-28 RX ORDER — BENZONATATE 200 MG/1
1 CAPSULE ORAL 3 TIMES DAILY PRN
COMMUNITY
Start: 2025-02-26

## 2025-02-28 RX ORDER — AMOXICILLIN 500 MG/1
2000 CAPSULE ORAL AS NEEDED
COMMUNITY
Start: 2025-01-08

## 2025-02-28 NOTE — PROGRESS NOTES
[facility] General Pulmonary Progress Note    History of Present Illness:  Jose Alfredo Solis is a 85 year old male with significant PMH hypertension, hyperlipidemia, hypothyroidism, CVA,, atrial fibrillation, coronary artery disease status post CABG who presents today for hospital follow up from 1/31-2/14/2025 AMS, sepsis, hyponatremia, thrombocytopenia, pneumonia, acute hypoxic respiratory failure, influenza a. Seen with his sister. Was discharged to rehab. Completed antibiotics and tamiflu. No history of lung diease. Reports SOB at time, productive cough. Denies acute concerns. Denies chest pain/pressure, wheezing, no fevers, chills, fatigue.  Not using inhaler. Using albuterol nebulizer every 6 hours.     Previously 2/2025 Dr Arechiga inpatient   Jose Alfredo Solis is a a(n) 85 year old male with history of hypertension, hyperlipidemia, hypothyroidism, CVA,, atrial fibrillation, coronary artery disease status post CABG.    The patient was admitted on 1/31/2025 with septic shock due to UTI with E. coli.  The patient was admitted to ICU and later transferred to the floor.  Today the patient was noted to be dyspneic and hypoxic so pulmonary consultation was obtained for further evaluation and management..  The patient admits to symptoms of shortness of breath at rest.  He has required supplemental oxygen at 3 L/min by nasal cannula.  He is currently in atrial fibrillation with rapid ventricular response so he was transferred to the  ICU.  The patient denies any chest pains, abdominal pains or weakness  Past Medical History:   Past Medical History:    High cholesterol    Hypertension    Hypothyroid    Stroke (HCC)        Past Surgical History:   Past Surgical History:   Procedure Laterality Date    Cabg      Other surgical history      prostate surgery       Family Medical History:   Family History   Problem Relation Age of Onset    Other (Other) Father         leukemia        Social History:   Social History      Socioeconomic History    Marital status:      Spouse name: Not on file    Number of children: Not on file    Years of education: Not on file    Highest education level: Not on file   Occupational History    Not on file   Tobacco Use    Smoking status: Former     Current packs/day: 0.00     Types: Cigarettes     Quit date: 1968     Years since quittin.5    Smokeless tobacco: Never   Vaping Use    Vaping status: Never Used   Substance and Sexual Activity    Alcohol use: Yes     Comment: occas    Drug use: No    Sexual activity: Not on file   Other Topics Concern    Not on file   Social History Narrative    Not on file     Social Drivers of Health     Food Insecurity: No Food Insecurity (2025)    Food Insecurity     Food Insecurity: Never true   Transportation Needs: No Transportation Needs (2025)    Transportation Needs     Lack of Transportation: No     Car Seat: Not on file   Stress: Not on file   Housing Stability: Low Risk  (2025)    Housing Stability     Housing Instability: No     Housing Instability Emergency: Not on file     Crib or Bassinette: Not on file        Medications:   Current Outpatient Medications   Medication Sig Dispense Refill    fluticasone furoate-vilanterol (BREO ELLIPTA) 100-25 MCG/ACT Inhalation Aerosol Powder, Breath Activated Inhale 1 puff into the lungs daily. 1 each 1    HYDROcodone-homatropine 5-1.5 MG/5ML Oral Solution Take 5 mL by mouth every 8 (eight) hours as needed. 120 mL 0    metoprolol succinate ER 50 MG Oral Tablet 24 Hr Take 1 tablet (50 mg total) by mouth 2x Daily(Beta Blocker). 60 tablet 0    dilTIAZem 30 MG Oral Tab Take 1 tablet (30 mg total) by mouth every 12 (twelve) hours. 60 tablet 2    furosemide 20 MG Oral Tab Take 1 tablet (20 mg total) by mouth daily.      ferrous sulfate 325 (65 FE) MG Oral Tab EC Take 1 tablet (325 mg total) by mouth daily with breakfast.      amiodarone 200 MG Oral Tab Take 1 tablet (200 mg total) by mouth  daily.      pantoprazole 40 MG Oral Tab EC Take 1 tablet (40 mg total) by mouth every morning before breakfast.      atorvastatin 20 MG Oral Tab Take 1 tablet (20 mg total) by mouth nightly.      Cholecalciferol (VITAMIN D3) 250 MCG (63400 UT) Oral Cap Take 10,000 Units by mouth See Admin Instructions. Twice a week      aspirin 81 MG Oral Tab EC Take 1 tablet (81 mg total) by mouth daily.      apixaban 5 MG Oral Tab Take 5 mg by mouth 2 (two) times daily.      tamsulosin HCl 0.4 MG Oral Cap Take 1 capsule (0.4 mg total) by mouth daily.      Multiple Vitamins-Minerals (EYE VITAMINS OR) Take by mouth.         Review of Systems: Review of Systems   Constitutional: Negative.    HENT: Negative.     Respiratory:  Positive for cough and shortness of breath.    Cardiovascular: Negative.    Gastrointestinal: Negative.         Physical Exam:  There were no vitals taken for this visit.     Constitutional: alert, cooperative. No acute distress.  HEENT: Head NC/AT.   Cardio: Regular rate and rhythm. Normal S1 and S2. No murmurs.   Respiratory: Thorax symmetrical with no labored breathing. Clear to ausculation bilaterally with symmetrical breath sounds. No wheezing, rhonchi, rales, or crackles.   Extremities: No clubbing or cyanosis. No BLE edema.    Neurologic: A&Ox3. No gross motor deficits.  Skin: Warm, dry  Psych: Calm, cooperative. Pleasant affect.    Results:  Personally reviewed    WBC: 18.6, done on 2/14/2025.  HGB: 9.9, done on 2/14/2025.  PLT: 534, done on 2/14/2025.     Glucose: 125, done on 2/12/2025.  Cr: 0.63, done on 2/12/2025.  Last eGFR was 92 on 2/12/2025.  CA: 9.4, done on 2/12/2025.  Na: 135, done on 2/12/2025.  K: 3.9, done on 2/12/2025.  Cl: 99, done on 2/12/2025.  CO2: 27, done on 2/12/2025.  Last ALB was 3.2% done on 2/8/2025.     XR CHEST AP PORTABLE  (CPT=71045)    Result Date: 2/10/2025  CONCLUSION:  Postoperative changes of cardiothoracic surgery with stable cardiac and mediastinal contours.  Stable  positioning of left chest wall ICD.  Moderate, diffuse interstitial opacities noted throughout the lungs with patchy alveolar component of the left mid/upper lung, slightly progressed in the interim.  Findings may reflect pulmonary edema or infectious/inflammatory process.  No associated pleural effusion or pneumothorax.   LOCATION:  [id number]      Dictated by (CST): Lamar Mendes MD on 2/10/2025 at 7:33 AM     Finalized by (CST): Lamar Mendes MD on 2/10/2025 at 7:34 AM       XR CHEST AP PORTABLE  (CPT=71045)    Result Date: 2/9/2025  CONCLUSION:  1. No significant improvement in extensive bilateral left worse than right infiltrates.  Findings are again concerning for pneumonia.  Superimposed pulmonary edema is not excluded. 2. Stable small left pleural effusion.    LOCATION:  [facility]      Dictated by (CST): Brendon Shannon MD on 2/09/2025 at 5:24 AM     Finalized by (CST): Brendon Shannon MD on 2/09/2025 at 5:26 AM       XR CHEST AP PORTABLE  (CPT=71045)    Result Date: 2/8/2025  CONCLUSION:  Worsening airspace consolidation in the left upper and mid lung field and slight worsening patchy airspace infiltrate in the right perihilar lung.  Findings are concerning for pneumonia.  Superimposed pulmonary edema is not excluded.  The infiltrates should be followed to resolution to exclude any underlying lung lesion.   LOCATION:  [facility]      Dictated by (CST): Brendon Shannon MD on 2/08/2025 at 7:36 AM     Finalized by (CST): Brendon Shannon MD on 2/08/2025 at 7:38 AM       XR CHEST AP PORTABLE  (CPT=71045)    Result Date: 2/5/2025  CONCLUSION:  Postoperative changes of cardiothoracic surgery with stable cardiac and mediastinal contours.  Stable positioning of left chest wall ICD.  There continues to be a perihilar distribution of interstitial opacities with increasing patchy alveolar component of the left upper lobe.  Pulmonary edema is favored, though an infectious/inflammatory process could have a similar  appearance.  No associated pleural effusion or pneumothorax.   LOCATION:  [facility]      Dictated by (CST): Lamar Mendes MD on 2/05/2025 at 3:06 PM     Finalized by (CST): Lamar Mendes MD on 2/05/2025 at 3:08 PM       XR FOOT, COMPLETE (MIN 3 VIEWS), LEFT (CPT=73630)    Result Date: 2/4/2025  CONCLUSION:  Degenerative and arthritic changes are seen in the ankle and foot.  There is no focal bony destructive lesion seen.  No soft tissue gas identified.   LOCATION:  [facility]   Dictated by (CST): Brendon Shannon MD on 2/04/2025 at 1:28 PM     Finalized by (CST): Brendon Shannon MD on 2/04/2025 at 1:31 PM       XR VIDEO SWALLOW (CPT=74230)    Result Date: 2/3/2025  PROCEDURE:  XR VIDEO SWALLOW (CPT=74230)  TECHNIQUE:  Video fluoroscopic swallowing study was performed in cooperation with the speech pathologist.  Barium of varying consistencies was administered orally with patient in lateral projection.  COMPARISON:  None.  INDICATIONS:  c/f aspiration, CXR with ggo  PATIENT STATED HISTORY: (As transcribed by Technologist)  Patient offered no additional history at this time.   CINE CAPTURES:  5 FLUORSCOPY TIME:  55 seconds RADIATION DOSE (AIR KERMA PRODUCT):  203.7 uGy/m2   FINDINGS:  PHARYNGEAL PHASE:  Normal for age. ASPIRATION:  None. PENETRATION:  Transient penetration with thin barium by cup. OTHER:  Mild residual vallecular barium with the thicker consistencies.  PLEASE SEE SPEECH PATHOLOGIST REPORT FOR FULL ASSESSMENT OF SWALLOWING MECHANISM.   LOCATION:  [facility]    Dictated by (CST): Lamar Mendes MD on 2/03/2025 at 2:06 PM     Finalized by (CST): Lamar Mendes MD on 2/03/2025 at 2:06 PM       CT CHEST (CONTRAST ONLY) ABDOMEN (W+WO) PELVIS (W+) (CPT=71260/67812)    Result Date: 2/2/2025  CONCLUSION:   1. No evidence of pulmonary embolus.  2. Interlobular septal thickening along with pleural effusions is suggestive of fluid overload.  3. Ground-glass consolidations in the left lung are noted.  This may be due to  infectious process.  However, this could also be due to pulmonary edema.  4. Cholelithiasis is incidentally noted.  An acute inflammatory process in the abdomen and pelvis is not identified.     LOCATION:  [id number]   Dictated by (CST): Donato Maldonado MD on 2/02/2025 at 1:59 PM     Finalized by (CST): Donato Maldonado MD on 2/02/2025 at 2:06 PM       XR CHEST AP PORTABLE  (CPT=71045)    Result Date: 2/2/2025  CONCLUSION:  Findings are suggestive of increased fluid overload.   LOCATION:  [id number]      Dictated by (CST): Donato Maldonado MD on 2/02/2025 at 12:11 PM     Finalized by (CST): Donato Maldonado MD on 2/02/2025 at 12:11 PM       XR CHEST AP PORTABLE  (CPT=71045)    Result Date: 1/31/2025  CONCLUSION:  No focal consolidation.  Please see details as above.   ED M.D. notified of these findings with preliminary radiology report from [facility] services.   LOCATION:  [facility]      Dictated by (CST): Saida Andrade MD on 1/31/2025 at 8:29 AM     Finalized by (CST): Saida Andrade MD on 1/31/2025 at 8:30 AM       XR CHEST AP PORTABLE  (CPT=71045)    Result Date: 1/31/2025  CONCLUSION:  Interval placement right internal jugular central venous catheter which is appropriately position.  There is no evidence of complication.   LOCATION:  [facility]      Dictated by (CST): Jordan Tilley MD on 1/31/2025 at 7:15 AM     Finalized by (CST): Jordan Tilley MD on 1/31/2025 at 7:16 AM       CT STROKE CTA BRAIN/CTA NECK (W IV)(CPT=70496/57507)    Result Date: 1/31/2025  CONCLUSION:  1. Encephalomalacia right frontal and parietal lobe and multifocal small vessel ischemic change are noted consistent with chronic ischemia. 2. Intracranial CT angiogram demonstrates no large vessel occlusion or aneurysm. 3. CT angiogram of neck demonstrates no significant stenosis or dissection.   Preliminary report was reviewed and there is no significant discrepancy.    LOCATION:  [facility]   Dictated by (CST): Jordan Tilley MD on 1/31/2025 at 6:06 AM      Finalized by (CST): Jordan Tilley MD on 1/31/2025 at 6:10 AM       CT STROKE BRAIN (NO IV)(CPT=70450)    Result Date: 1/31/2025  CONCLUSION:  1. There are chronic ischemic changes with multifocal encephalomalacia right frontal and parietal lobe and periventricular small vessel ischemic change. 2. There is atrophy. 3. There is no specific evidence of an acute abnormality.  Preliminary report was reviewed and there is no significant discrepancy.    LOCATION:  [facility]   Dictated by (CST): Jordan Tilley MD on 1/31/2025 at 6:03 AM     Finalized by (CST): Jordan Tilley MD on 1/31/2025 at 6:06 AM         Assessment/Plan:  #1. Acute hypoxic respiratory failure  1/2025 CXR No focal consolidation, pleural effusion, or pneumothorax   2/2025 CT chest GGO YASH, atelectasis BLL, no PE, pulmonary edema   2/2025 CXR diffused Insterstitial opacities throughout the left lung  Was able to be weaned off of oxygen before discharged  Resolved, currently asymptomatic     #2. Influenza A  Completed tamiflu    #3. Pneumonia  Completed course of antibiotics   Imaging as above    #4 Cough/dyspnea  Related to above  Start breo inhaler daily  Use albuterol neb as needed     Radha Alvarenga [facility]  Pulmonary Medicine   2/28/2025

## 2025-02-28 NOTE — PATIENT INSTRUCTIONS
Call office with any questions or concerns  Call office if develop any new or worsening respiratory symptoms.   Call office if your inhalers are more than $50 after co-pay  Start breo one puff once a day, rinse mouth out after  Continue with albuterol neb as needed for shortness of breath